# Patient Record
Sex: MALE | Race: WHITE | NOT HISPANIC OR LATINO
[De-identification: names, ages, dates, MRNs, and addresses within clinical notes are randomized per-mention and may not be internally consistent; named-entity substitution may affect disease eponyms.]

---

## 2020-07-07 ENCOUNTER — APPOINTMENT (OUTPATIENT)
Dept: UROLOGY | Facility: CLINIC | Age: 55
End: 2020-07-07
Payer: COMMERCIAL

## 2020-07-07 VITALS
SYSTOLIC BLOOD PRESSURE: 107 MMHG | HEART RATE: 73 BPM | BODY MASS INDEX: 29.23 KG/M2 | WEIGHT: 165 LBS | DIASTOLIC BLOOD PRESSURE: 68 MMHG | TEMPERATURE: 98.1 F | HEIGHT: 63 IN

## 2020-07-07 DIAGNOSIS — Z12.5 ENCOUNTER FOR SCREENING FOR MALIGNANT NEOPLASM OF PROSTATE: ICD-10-CM

## 2020-07-07 PROCEDURE — 99204 OFFICE O/P NEW MOD 45 MIN: CPT

## 2020-07-07 NOTE — PHYSICAL EXAM
[General Appearance - Well Nourished] : well nourished [General Appearance - Well Developed] : well developed [Respiration, Rhythm And Depth] : normal respiratory rhythm and effort [] : no respiratory distress [Heart Rate And Rhythm] : Heart rate and rhythm were normal [Bowel Sounds] : normal bowel sounds [Abdomen Soft] : soft [Testes Mass (___cm)] : there were no testicular masses [Testes Tenderness] : no tenderness of the testes [Penis Abnormality] : normal circumcised penis [Normal Station and Gait] : the gait and station were normal for the patient's age [Prostate Tenderness] : the prostate was not tender [Skin Turgor] : supple [No Focal Deficits] : no focal deficits [Skin Color & Pigmentation] : normal skin color and pigmentation [Not Anxious] : not anxious [Oriented To Time, Place, And Person] : oriented to person, place, and time [FreeTextEntry1] : TREVOR 40g, smooth based, no nodules

## 2020-07-07 NOTE — ASSESSMENT
[FreeTextEntry1] : 53 yo M with a PMHx of CAD s/p cardiac stent, here today to establish care\par \par - PSA ordered, TREVOR without nodules\par - Given history and symptoms, will send urine for culture today.

## 2020-07-07 NOTE — HISTORY OF PRESENT ILLNESS
[Weak Stream] : weak stream [Urinary Urgency] : urinary urgency [FreeTextEntry1] : 55 yo M with a PMHx of urinary frequency was seeing Dr. Dunham (?) who recently retired for LUTS who gave him a "medication" that resolved his symptoms. Complains of terminal dysuria occasional straining and split stream with occasional draining. No hematuria, no fevers/chills. \par \par Uroflow: \par  cc\par Qmax 8.9 cc/s\par Qaverage 4.1 cc/s\par PVR: 35 cc \par \par Anticoagulation: no aspirin or Plavix\par All: NKDA\par Social: restaurant LES, , no kids\par PMHx: CAD s/p cardiac stent last year; aortic stenosis\par FHx: father passed away from MI, familial hyperlipidemia, \par PSHx: cardiac stent placement, no family history of  malignancy\par  [Urinary Retention] : no urinary retention [Urinary Incontinence] : no urinary incontinence [Urinary Frequency] : no urinary frequency

## 2020-07-13 LAB — BACTERIA UR CULT: NORMAL

## 2020-08-03 LAB
PSA FREE FLD-MCNC: 39 %
PSA FREE SERPL-MCNC: 0.39 NG/ML
PSA SERPL-MCNC: 1 NG/ML

## 2020-08-22 ENCOUNTER — INPATIENT (INPATIENT)
Facility: HOSPITAL | Age: 55
LOS: 8 days | Discharge: HOME | DRG: 246 | End: 2020-08-31
Attending: INTERNAL MEDICINE | Admitting: INTERNAL MEDICINE
Payer: COMMERCIAL

## 2020-08-22 VITALS
RESPIRATION RATE: 20 BRPM | WEIGHT: 165.35 LBS | HEIGHT: 63 IN | OXYGEN SATURATION: 96 % | DIASTOLIC BLOOD PRESSURE: 56 MMHG | TEMPERATURE: 98 F | HEART RATE: 90 BPM | SYSTOLIC BLOOD PRESSURE: 104 MMHG

## 2020-08-22 DIAGNOSIS — Z90.49 ACQUIRED ABSENCE OF OTHER SPECIFIED PARTS OF DIGESTIVE TRACT: Chronic | ICD-10-CM

## 2020-08-22 LAB
A1C WITH ESTIMATED AVERAGE GLUCOSE RESULT: 5.4 % — SIGNIFICANT CHANGE UP (ref 4–5.6)
ALBUMIN SERPL ELPH-MCNC: 3.5 G/DL — SIGNIFICANT CHANGE UP (ref 3.3–5)
ALBUMIN SERPL ELPH-MCNC: 3.9 G/DL — SIGNIFICANT CHANGE UP (ref 3.3–5)
ALP SERPL-CCNC: 65 U/L — SIGNIFICANT CHANGE UP (ref 40–120)
ALP SERPL-CCNC: 75 U/L — SIGNIFICANT CHANGE UP (ref 40–120)
ALT FLD-CCNC: 36 U/L — SIGNIFICANT CHANGE UP (ref 10–45)
ALT FLD-CCNC: 41 U/L — SIGNIFICANT CHANGE UP (ref 10–45)
ANION GAP SERPL CALC-SCNC: 12 MMOL/L — SIGNIFICANT CHANGE UP (ref 5–17)
ANION GAP SERPL CALC-SCNC: 17 MMOL/L — SIGNIFICANT CHANGE UP (ref 5–17)
APTT BLD: 33.7 SEC — SIGNIFICANT CHANGE UP (ref 27.5–35.5)
APTT BLD: 89.6 SEC — HIGH (ref 27.5–35.5)
AST SERPL-CCNC: 38 U/L — SIGNIFICANT CHANGE UP (ref 10–40)
AST SERPL-CCNC: 99 U/L — HIGH (ref 10–40)
BASOPHILS # BLD AUTO: 0.02 K/UL — SIGNIFICANT CHANGE UP (ref 0–0.2)
BASOPHILS # BLD AUTO: 0.03 K/UL — SIGNIFICANT CHANGE UP (ref 0–0.2)
BASOPHILS # BLD AUTO: 0.06 K/UL — SIGNIFICANT CHANGE UP (ref 0–0.2)
BASOPHILS NFR BLD AUTO: 0.2 % — SIGNIFICANT CHANGE UP (ref 0–2)
BASOPHILS NFR BLD AUTO: 0.2 % — SIGNIFICANT CHANGE UP (ref 0–2)
BASOPHILS NFR BLD AUTO: 0.5 % — SIGNIFICANT CHANGE UP (ref 0–2)
BILIRUB SERPL-MCNC: 0.4 MG/DL — SIGNIFICANT CHANGE UP (ref 0.2–1.2)
BILIRUB SERPL-MCNC: 0.5 MG/DL — SIGNIFICANT CHANGE UP (ref 0.2–1.2)
BLD GP AB SCN SERPL QL: NEGATIVE — SIGNIFICANT CHANGE UP
BLD GP AB SCN SERPL QL: NEGATIVE — SIGNIFICANT CHANGE UP
BUN SERPL-MCNC: 22 MG/DL — SIGNIFICANT CHANGE UP (ref 7–23)
BUN SERPL-MCNC: 24 MG/DL — HIGH (ref 7–23)
CALCIUM SERPL-MCNC: 11.5 MG/DL — HIGH (ref 8.4–10.5)
CALCIUM SERPL-MCNC: 9.6 MG/DL — SIGNIFICANT CHANGE UP (ref 8.4–10.5)
CHLORIDE SERPL-SCNC: 102 MMOL/L — SIGNIFICANT CHANGE UP (ref 96–108)
CHLORIDE SERPL-SCNC: 98 MMOL/L — SIGNIFICANT CHANGE UP (ref 96–108)
CHOLEST SERPL-MCNC: 276 MG/DL — HIGH (ref 10–199)
CO2 SERPL-SCNC: 21 MMOL/L — LOW (ref 22–31)
CO2 SERPL-SCNC: 22 MMOL/L — SIGNIFICANT CHANGE UP (ref 22–31)
CREAT SERPL-MCNC: 0.89 MG/DL — SIGNIFICANT CHANGE UP (ref 0.5–1.3)
CREAT SERPL-MCNC: 1.19 MG/DL — SIGNIFICANT CHANGE UP (ref 0.5–1.3)
EOSINOPHIL # BLD AUTO: 0.04 K/UL — SIGNIFICANT CHANGE UP (ref 0–0.5)
EOSINOPHIL # BLD AUTO: 0.08 K/UL — SIGNIFICANT CHANGE UP (ref 0–0.5)
EOSINOPHIL # BLD AUTO: 0.14 K/UL — SIGNIFICANT CHANGE UP (ref 0–0.5)
EOSINOPHIL NFR BLD AUTO: 0.3 % — SIGNIFICANT CHANGE UP (ref 0–6)
EOSINOPHIL NFR BLD AUTO: 0.7 % — SIGNIFICANT CHANGE UP (ref 0–6)
EOSINOPHIL NFR BLD AUTO: 1.1 % — SIGNIFICANT CHANGE UP (ref 0–6)
ESTIMATED AVERAGE GLUCOSE: 108 MG/DL — SIGNIFICANT CHANGE UP (ref 68–114)
GAS PNL BLDV: SIGNIFICANT CHANGE UP
GLUCOSE SERPL-MCNC: 147 MG/DL — HIGH (ref 70–99)
GLUCOSE SERPL-MCNC: 270 MG/DL — HIGH (ref 70–99)
HCT VFR BLD CALC: 39 % — SIGNIFICANT CHANGE UP (ref 39–50)
HCT VFR BLD CALC: 43.1 % — SIGNIFICANT CHANGE UP (ref 39–50)
HCT VFR BLD CALC: 43.2 % — SIGNIFICANT CHANGE UP (ref 39–50)
HDLC SERPL-MCNC: 34 MG/DL — LOW
HGB BLD-MCNC: 13.1 G/DL — SIGNIFICANT CHANGE UP (ref 13–17)
HGB BLD-MCNC: 14.1 G/DL — SIGNIFICANT CHANGE UP (ref 13–17)
HGB BLD-MCNC: 14.2 G/DL — SIGNIFICANT CHANGE UP (ref 13–17)
IMM GRANULOCYTES NFR BLD AUTO: 0.5 % — SIGNIFICANT CHANGE UP (ref 0–1.5)
IMM GRANULOCYTES NFR BLD AUTO: 0.5 % — SIGNIFICANT CHANGE UP (ref 0–1.5)
IMM GRANULOCYTES NFR BLD AUTO: 0.9 % — SIGNIFICANT CHANGE UP (ref 0–1.5)
INR BLD: 1.24 — HIGH (ref 0.88–1.16)
LIDOCAIN IGE QN: 30 U/L — SIGNIFICANT CHANGE UP (ref 7–60)
LIPID PNL WITH DIRECT LDL SERPL: 226 MG/DL — HIGH
LYMPHOCYTES # BLD AUTO: 1.01 K/UL — SIGNIFICANT CHANGE UP (ref 1–3.3)
LYMPHOCYTES # BLD AUTO: 1.02 K/UL — SIGNIFICANT CHANGE UP (ref 1–3.3)
LYMPHOCYTES # BLD AUTO: 16.8 % — SIGNIFICANT CHANGE UP (ref 13–44)
LYMPHOCYTES # BLD AUTO: 2.13 K/UL — SIGNIFICANT CHANGE UP (ref 1–3.3)
LYMPHOCYTES # BLD AUTO: 8 % — LOW (ref 13–44)
LYMPHOCYTES # BLD AUTO: 9.3 % — LOW (ref 13–44)
MAGNESIUM SERPL-MCNC: 1.8 MG/DL — SIGNIFICANT CHANGE UP (ref 1.6–2.6)
MAGNESIUM SERPL-MCNC: 1.9 MG/DL — SIGNIFICANT CHANGE UP (ref 1.6–2.6)
MCHC RBC-ENTMCNC: 29.8 PG — SIGNIFICANT CHANGE UP (ref 27–34)
MCHC RBC-ENTMCNC: 29.9 PG — SIGNIFICANT CHANGE UP (ref 27–34)
MCHC RBC-ENTMCNC: 30.2 PG — SIGNIFICANT CHANGE UP (ref 27–34)
MCHC RBC-ENTMCNC: 32.7 GM/DL — SIGNIFICANT CHANGE UP (ref 32–36)
MCHC RBC-ENTMCNC: 32.9 GM/DL — SIGNIFICANT CHANGE UP (ref 32–36)
MCHC RBC-ENTMCNC: 33.6 GM/DL — SIGNIFICANT CHANGE UP (ref 32–36)
MCV RBC AUTO: 88.8 FL — SIGNIFICANT CHANGE UP (ref 80–100)
MCV RBC AUTO: 91.5 FL — SIGNIFICANT CHANGE UP (ref 80–100)
MCV RBC AUTO: 91.9 FL — SIGNIFICANT CHANGE UP (ref 80–100)
MONOCYTES # BLD AUTO: 0.66 K/UL — SIGNIFICANT CHANGE UP (ref 0–0.9)
MONOCYTES # BLD AUTO: 0.82 K/UL — SIGNIFICANT CHANGE UP (ref 0–0.9)
MONOCYTES # BLD AUTO: 0.86 K/UL — SIGNIFICANT CHANGE UP (ref 0–0.9)
MONOCYTES NFR BLD AUTO: 5.2 % — SIGNIFICANT CHANGE UP (ref 2–14)
MONOCYTES NFR BLD AUTO: 6.5 % — SIGNIFICANT CHANGE UP (ref 2–14)
MONOCYTES NFR BLD AUTO: 7.9 % — SIGNIFICANT CHANGE UP (ref 2–14)
NEUTROPHILS # BLD AUTO: 10.98 K/UL — HIGH (ref 1.8–7.4)
NEUTROPHILS # BLD AUTO: 8.85 K/UL — HIGH (ref 1.8–7.4)
NEUTROPHILS # BLD AUTO: 9.44 K/UL — HIGH (ref 1.8–7.4)
NEUTROPHILS NFR BLD AUTO: 74.2 % — SIGNIFICANT CHANGE UP (ref 43–77)
NEUTROPHILS NFR BLD AUTO: 81.4 % — HIGH (ref 43–77)
NEUTROPHILS NFR BLD AUTO: 85.8 % — HIGH (ref 43–77)
NRBC # BLD: 0 /100 WBCS — SIGNIFICANT CHANGE UP (ref 0–0)
NT-PROBNP SERPL-SCNC: 1125 PG/ML — HIGH (ref 0–300)
PHOSPHATE SERPL-MCNC: 3.9 MG/DL — SIGNIFICANT CHANGE UP (ref 2.5–4.5)
PLATELET # BLD AUTO: 171 K/UL — SIGNIFICANT CHANGE UP (ref 150–400)
PLATELET # BLD AUTO: 172 K/UL — SIGNIFICANT CHANGE UP (ref 150–400)
PLATELET # BLD AUTO: 214 K/UL — SIGNIFICANT CHANGE UP (ref 150–400)
POTASSIUM SERPL-MCNC: 3.7 MMOL/L — SIGNIFICANT CHANGE UP (ref 3.5–5.3)
POTASSIUM SERPL-MCNC: 4.4 MMOL/L — SIGNIFICANT CHANGE UP (ref 3.5–5.3)
POTASSIUM SERPL-SCNC: 3.7 MMOL/L — SIGNIFICANT CHANGE UP (ref 3.5–5.3)
POTASSIUM SERPL-SCNC: 4.4 MMOL/L — SIGNIFICANT CHANGE UP (ref 3.5–5.3)
PROT SERPL-MCNC: 7.2 G/DL — SIGNIFICANT CHANGE UP (ref 6–8.3)
PROT SERPL-MCNC: 7.3 G/DL — SIGNIFICANT CHANGE UP (ref 6–8.3)
PROTHROM AB SERPL-ACNC: 14.7 SEC — HIGH (ref 10.6–13.6)
RBC # BLD: 4.39 M/UL — SIGNIFICANT CHANGE UP (ref 4.2–5.8)
RBC # BLD: 4.7 M/UL — SIGNIFICANT CHANGE UP (ref 4.2–5.8)
RBC # BLD: 4.71 M/UL — SIGNIFICANT CHANGE UP (ref 4.2–5.8)
RBC # FLD: 13.1 % — SIGNIFICANT CHANGE UP (ref 10.3–14.5)
RBC # FLD: 13.2 % — SIGNIFICANT CHANGE UP (ref 10.3–14.5)
RBC # FLD: 13.2 % — SIGNIFICANT CHANGE UP (ref 10.3–14.5)
RH IG SCN BLD-IMP: NEGATIVE — SIGNIFICANT CHANGE UP
RH IG SCN BLD-IMP: NEGATIVE — SIGNIFICANT CHANGE UP
SARS-COV-2 RNA SPEC QL NAA+PROBE: SIGNIFICANT CHANGE UP
SODIUM SERPL-SCNC: 135 MMOL/L — SIGNIFICANT CHANGE UP (ref 135–145)
SODIUM SERPL-SCNC: 137 MMOL/L — SIGNIFICANT CHANGE UP (ref 135–145)
TOTAL CHOLESTEROL/HDL RATIO MEASUREMENT: 8.1 RATIO — SIGNIFICANT CHANGE UP (ref 3.4–9.6)
TRIGL SERPL-MCNC: 81 MG/DL — SIGNIFICANT CHANGE UP (ref 10–149)
TROPONIN T SERPL-MCNC: 0.88 NG/ML — CRITICAL HIGH (ref 0–0.01)
TSH SERPL-MCNC: 1.53 UIU/ML — SIGNIFICANT CHANGE UP (ref 0.35–4.94)
WBC # BLD: 10.87 K/UL — HIGH (ref 3.8–10.5)
WBC # BLD: 12.71 K/UL — HIGH (ref 3.8–10.5)
WBC # BLD: 12.8 K/UL — HIGH (ref 3.8–10.5)
WBC # FLD AUTO: 10.87 K/UL — HIGH (ref 3.8–10.5)
WBC # FLD AUTO: 12.71 K/UL — HIGH (ref 3.8–10.5)
WBC # FLD AUTO: 12.8 K/UL — HIGH (ref 3.8–10.5)

## 2020-08-22 PROCEDURE — 93010 ELECTROCARDIOGRAM REPORT: CPT

## 2020-08-22 PROCEDURE — 99291 CRITICAL CARE FIRST HOUR: CPT

## 2020-08-22 PROCEDURE — 93306 TTE W/DOPPLER COMPLETE: CPT | Mod: 26

## 2020-08-22 PROCEDURE — 71045 X-RAY EXAM CHEST 1 VIEW: CPT | Mod: 26

## 2020-08-22 PROCEDURE — 92943 PRQ TRLUML REVSC CH OCC ANT: CPT | Mod: LC

## 2020-08-22 PROCEDURE — 93454 CORONARY ARTERY ANGIO S&I: CPT | Mod: 26,59

## 2020-08-22 RX ORDER — NICOTINE POLACRILEX 2 MG
2 GUM BUCCAL ONCE
Refills: 0 | Status: COMPLETED | OUTPATIENT
Start: 2020-08-22 | End: 2020-08-22

## 2020-08-22 RX ORDER — LANOLIN ALCOHOL/MO/W.PET/CERES
5 CREAM (GRAM) TOPICAL AT BEDTIME
Refills: 0 | Status: DISCONTINUED | OUTPATIENT
Start: 2020-08-22 | End: 2020-08-31

## 2020-08-22 RX ORDER — ATORVASTATIN CALCIUM 80 MG/1
80 TABLET, FILM COATED ORAL AT BEDTIME
Refills: 0 | Status: DISCONTINUED | OUTPATIENT
Start: 2020-08-22 | End: 2020-08-31

## 2020-08-22 RX ORDER — TICAGRELOR 90 MG/1
180 TABLET ORAL ONCE
Refills: 0 | Status: COMPLETED | OUTPATIENT
Start: 2020-08-22 | End: 2020-08-22

## 2020-08-22 RX ORDER — HEPARIN SODIUM 5000 [USP'U]/ML
5000 INJECTION INTRAVENOUS; SUBCUTANEOUS ONCE
Refills: 0 | Status: COMPLETED | OUTPATIENT
Start: 2020-08-22 | End: 2020-08-22

## 2020-08-22 RX ORDER — HYDROXYZINE HCL 10 MG
25 TABLET ORAL ONCE
Refills: 0 | Status: COMPLETED | OUTPATIENT
Start: 2020-08-22 | End: 2020-08-22

## 2020-08-22 RX ORDER — FUROSEMIDE 40 MG
20 TABLET ORAL ONCE
Refills: 0 | Status: COMPLETED | OUTPATIENT
Start: 2020-08-22 | End: 2020-08-22

## 2020-08-22 RX ORDER — CHLORHEXIDINE GLUCONATE 213 G/1000ML
1 SOLUTION TOPICAL
Refills: 0 | Status: DISCONTINUED | OUTPATIENT
Start: 2020-08-22 | End: 2020-08-31

## 2020-08-22 RX ORDER — ASPIRIN/CALCIUM CARB/MAGNESIUM 324 MG
81 TABLET ORAL DAILY
Refills: 0 | Status: DISCONTINUED | OUTPATIENT
Start: 2020-08-23 | End: 2020-08-31

## 2020-08-22 RX ORDER — ACETAMINOPHEN 500 MG
650 TABLET ORAL ONCE
Refills: 0 | Status: COMPLETED | OUTPATIENT
Start: 2020-08-22 | End: 2020-08-22

## 2020-08-22 RX ORDER — IPRATROPIUM/ALBUTEROL SULFATE 18-103MCG
3 AEROSOL WITH ADAPTER (GRAM) INHALATION EVERY 6 HOURS
Refills: 0 | Status: DISCONTINUED | OUTPATIENT
Start: 2020-08-22 | End: 2020-08-29

## 2020-08-22 RX ORDER — ASPIRIN/CALCIUM CARB/MAGNESIUM 324 MG
162 TABLET ORAL ONCE
Refills: 0 | Status: DISCONTINUED | OUTPATIENT
Start: 2020-08-22 | End: 2020-08-22

## 2020-08-22 RX ORDER — NICOTINE POLACRILEX 2 MG
1 GUM BUCCAL DAILY
Refills: 0 | Status: DISCONTINUED | OUTPATIENT
Start: 2020-08-22 | End: 2020-08-31

## 2020-08-22 RX ORDER — ENOXAPARIN SODIUM 100 MG/ML
40 INJECTION SUBCUTANEOUS EVERY 24 HOURS
Refills: 0 | Status: DISCONTINUED | OUTPATIENT
Start: 2020-08-22 | End: 2020-08-31

## 2020-08-22 RX ORDER — TICAGRELOR 90 MG/1
90 TABLET ORAL EVERY 12 HOURS
Refills: 0 | Status: DISCONTINUED | OUTPATIENT
Start: 2020-08-22 | End: 2020-08-27

## 2020-08-22 RX ADMIN — Medication 5 MILLIGRAM(S): at 21:41

## 2020-08-22 RX ADMIN — Medication 3 MILLILITER(S): at 12:35

## 2020-08-22 RX ADMIN — Medication 3 MILLILITER(S): at 21:40

## 2020-08-22 RX ADMIN — ATORVASTATIN CALCIUM 80 MILLIGRAM(S): 80 TABLET, FILM COATED ORAL at 21:41

## 2020-08-22 RX ADMIN — HEPARIN SODIUM 5000 UNIT(S): 5000 INJECTION INTRAVENOUS; SUBCUTANEOUS at 05:45

## 2020-08-22 RX ADMIN — ENOXAPARIN SODIUM 40 MILLIGRAM(S): 100 INJECTION SUBCUTANEOUS at 21:41

## 2020-08-22 RX ADMIN — Medication 25 MILLIGRAM(S): at 23:09

## 2020-08-22 RX ADMIN — Medication 650 MILLIGRAM(S): at 14:01

## 2020-08-22 RX ADMIN — TICAGRELOR 180 MILLIGRAM(S): 90 TABLET ORAL at 05:48

## 2020-08-22 RX ADMIN — Medication 650 MILLIGRAM(S): at 21:04

## 2020-08-22 RX ADMIN — Medication 650 MILLIGRAM(S): at 22:04

## 2020-08-22 RX ADMIN — Medication 100 MILLIGRAM(S): at 09:22

## 2020-08-22 RX ADMIN — Medication 650 MILLIGRAM(S): at 15:00

## 2020-08-22 RX ADMIN — Medication 20 MILLIGRAM(S): at 10:35

## 2020-08-22 RX ADMIN — Medication 1 PATCH: at 22:31

## 2020-08-22 RX ADMIN — Medication 3 MILLILITER(S): at 16:36

## 2020-08-22 RX ADMIN — TICAGRELOR 90 MILLIGRAM(S): 90 TABLET ORAL at 17:18

## 2020-08-22 NOTE — H&P ADULT - NSHPPHYSICALEXAM_GEN_ALL_CORE
.  VITAL SIGNS:  T(C): 36.8 (08-22-20 @ 09:00), Max: 36.8 (08-22-20 @ 09:00)  T(F): 98.3 (08-22-20 @ 09:00), Max: 98.3 (08-22-20 @ 09:00)  HR: 91 (08-22-20 @ 08:00) (82 - 93)  BP: 97/62 (08-22-20 @ 08:00) (95/56 - 109/62)  BP(mean): 74 (08-22-20 @ 08:00) (74 - 80)  RR: 34 (08-22-20 @ 08:00) (20 - 34)  SpO2: 93% (08-22-20 @ 08:00) (90% - 100%)  Wt(kg): --    PHYSICAL EXAM:  Constitutional: WDWN resting comfortably in bed; NAD  Head: NC/AT  ENT: MMM  Neck: supple; no JVD or thyromegaly  Respiratory: Significant wheezing appreciated bilaterally at both lung bases. No crackles.  Cardiac: +S1/S2; RRR; no M/R/G  Gastrointestinal: soft, NT/ND; no rebound or guarding; +BSx4  Extremities: WWP, no clubbing or cyanosis; no peripheral edema  Vascular: 2+ radial, DP/PT pulses B/L  Dermatologic: Right radial band in place with slight. No evidence of hematoma formation.   Neurologic: AAOx3; CNII-XII grossly intact; no focal deficits

## 2020-08-22 NOTE — ED ADULT NURSE NOTE - OBJECTIVE STATEMENT
Received a 55 year male with a chief complaint of chest pain onset at 0300 this AM while arriving at his home residence and ascending three flights of stairs. Patient reports concurrent shortness of breath, diaphoresis, and dyspnea with rest. Patient reports that he had intended to present to the ED when his symptoms have not improved. Patient describes the sensation as "chest discomfort" to the left side of his chest, non-radiating. Received a 55 year male with a chief complaint of chest pain onset at 0300 this AM while arriving at his home residence and ascending three flights of stairs. Patient reports concurrent shortness of breath, diaphoresis, and dyspnea with rest. Patient reports that he had intended to present to the ED when his symptoms have not improved. Patient describes the sensation as "chest discomfort" to the left side of his chest, non-radiating.    Additional assessment deferred for STEMI R/O notification and intervention.

## 2020-08-22 NOTE — H&P ADULT - NSHPLABSRESULTS_GEN_ALL_CORE
.  LABS:                         14.2   12.71 )-----------( 214      ( 22 Aug 2020 05:56 )             43.2     08-22    137  |  98  |  24<H>  ----------------------------<  270<H>  3.7   |  22  |  1.19    Ca    11.5<H>      22 Aug 2020 05:56  Mg     1.9     08-22    TPro  7.3  /  Alb  3.9  /  TBili  0.4  /  DBili  x   /  AST  38  /  ALT  36  /  AlkPhos  75  08-22        CARDIAC MARKERS ( 22 Aug 2020 05:56 )  x     / 0.88 ng/mL / x     / x     / x          Serum Pro-Brain Natriuretic Peptide: 1125 pg/mL (08-22 @ 05:56)        RADIOLOGY, EKG & ADDITIONAL TESTS: Reviewed.

## 2020-08-22 NOTE — ED PROVIDER NOTE - CLINICAL SUMMARY MEDICAL DECISION MAKING FREE TEXT BOX
chest tightness, diaphoretic, STEMI on EMS ekg. given aspirin and ivf  -check labs, ekg  -cxr  -continue IVF  -cath notification called - spoke with Dr. Hutson - recommend loading with brilinta and heparin

## 2020-08-22 NOTE — H&P ADULT - HISTORY OF PRESENT ILLNESS
Mr. Alexandrea Winter is a 55 year old male with past medical history significant for CAD (s/p proximal LAD stent in 2018), HLD, 40 pack year smoking history, history of aortic valve stenosis pending aortic valve replacement, presented this morning from his home with left sided chest pain, tightness, diaphoresis, and shortness of breath. He was found to have ST elevations in inferior leads and an elevated trop of 0.88, and was admitted ultimately for STEMI and sent emergently to cath lab. In the cath lab, he was found to have 85% occlusion to his proximal RCA, 75% occlusion to his mid RCA, 50% occlusion in the left main artery, and 75% occlusion to LAD proximal. His OM1 was found to be 100% thrombosed, was determined to be the culprit lesion, and STENT was placed. On morning of presentation around 2:45 AM, he went outside for a smoke, and when he was walking up the three flights of stairs to his apartment he experienced significant shortness of breath and chest pain, which he says he has experienced before; however, this time it lasted more than an hour. He tried to fall asleep, but was unable to because of the pain, and then called the ambulance. When he went down the three flights of stairs he fell to his knees in his apartment lobby because of the pain and shortness of breath. He did not hit his head. He was admitted after the procedure to the CCU for further monitoring.     This morning at bedside after his procedure, he felt significant better, without chest pain or shortness of breath.

## 2020-08-22 NOTE — H&P ADULT - ASSESSMENT
Mr. Alexandrea Winter is a 55 year old male with past medical history significant for CAD (s/p proximal LAD stent in 2018), HLD, 40 pack year smoking history, history of aortic valve stenosis pending aortic valve replacement, presented this morning with ST elevations in inferior leads and an elevated trop of 0.88, now s/p cath and 1 STEPHENIE placed in OM, admitted to CCU for further monitoring and management. Mr. Alexandrea Winter is a 55 year old male with past medical history significant for CAD (s/p proximal LAD stent in 2018), HLD, 40 pack year smoking history, history of aortic valve stenosis pending aortic valve replacement, presented this morning with ST elevations in inferior leads and an elevated trop of 0.88, now s/p cath and 1 STEPHENIE placed in OM, admitted to CCU for further monitoring and management.     Cardio  #STEMI: He was found to have ST elevations in inferior leads and an elevated trop of 0.88. In the cath lab, he was found to have 85% occlusion to his proximal RCA, 75% occlusion to his mid RCA, 50% occlusion in the left main artery, and 75% occlusion to LAD proximal. His OM1 was found to be 100% thrombosed, was determined to be the culprit lesion, and STENT was placed.   -Loaded with aspirin and brillanta this morning  -C/w brillanta 90 mg BID starting today 5 P, cw lipitor 80  -C/w aspirin 81 tomorrow  -F/u EKG, echo    #HLD: Lipid panel showed cholesterol 276, Direct , HDL: 34  -Continue with lipitor 80 mg daily    #CHF: CXR showed fluid congestion  -Questionable history of CHF-echo showing EF 35%, follow up official read  -Lasix 20mg IV  -Moniter I&O    #Aortic Stenosis: History of Aortic Stenosis   -F/u echocardiogram   -Obtain collaterol from cardiologist on Monday     Pulm  # Pulmonary Edema  -Requiring 4L O2, saturating at 98%, will wean   -Lasix 20 mg IV    #COPD-40 pack year smoking history with chronic cough  -On Duonebs Q 6 and Benzonotate   -f/u CXR     Endocrine  #Diabetes: Unsure of diabetic history  -A1C 5.4  -Continue to monitor     Renal  -No active issues    GI  -No active issues    Preventative Measures  E: replete as needed  N: DASH diet  DVT: SCD   GI PPX: none

## 2020-08-22 NOTE — H&P ADULT - NSHPREVIEWOFSYSTEMS_GEN_ALL_CORE
REVIEW OF SYSTEMS:    CONSTITUTIONAL: No weakness, fevers or chills  EYES/ENT: No visual changes;  No vertigo or throat pain   NECK: No pain or stiffness  RESPIRATORY: Significant for cough.   CARDIOVASCULAR: No chest pain or palpitations  GASTROINTESTINAL: No abdominal or epigastric pain. No nausea, vomiting, or hematemesis; No diarrhea or constipation. No melena or hematochezia.  GENITOURINARY: No dysuria, frequency or hematuria  NEUROLOGICAL: No numbness or weakness  SKIN: No itching, burning, rashes, or lesions   All other review of systems is negative unless indicated above.

## 2020-08-22 NOTE — ED ADULT TRIAGE NOTE - AS TEMP SITE
oral
c shaped area of erythema r thigh 7cm x 1.5cm with warmth no fluctuance or induration/BLANCHING

## 2020-08-22 NOTE — H&P ADULT - NSHPSOCIALHISTORY_GEN_ALL_CORE
40 Pack year tobacco history. Socially drinks. No illicit drug use. Lives alone in apartment. Works as manager of fast food restaurant.

## 2020-08-22 NOTE — ED PROVIDER NOTE - OBJECTIVE STATEMENT
55M hx high chol, stents, c/o chest tightness. pt states tightness started tonight.  felt very sweaty with tightness.  upon EMS arrival pt diaphoretic, hypotense.  noted to have ST elevations inferiorly, given aspirin, ivf started.  pt also given calcium per EMS tele recommendation.  no fevers. no LE swelling. pt states only takes medication for his high chol.  states stent was placed 2 years ago, however not on aspirin or plavix.  pt states he is supposed to be scheduled for an aortic valve replacement.

## 2020-08-23 LAB
ALBUMIN SERPL ELPH-MCNC: 3.6 G/DL — SIGNIFICANT CHANGE UP (ref 3.3–5)
ALP SERPL-CCNC: 62 U/L — SIGNIFICANT CHANGE UP (ref 40–120)
ALT FLD-CCNC: 51 U/L — HIGH (ref 10–45)
ANION GAP SERPL CALC-SCNC: 11 MMOL/L — SIGNIFICANT CHANGE UP (ref 5–17)
APPEARANCE UR: CLEAR — SIGNIFICANT CHANGE UP
AST SERPL-CCNC: 146 U/L — HIGH (ref 10–40)
BASE EXCESS BLDA CALC-SCNC: 5.1 MMOL/L — HIGH (ref -2–3)
BASOPHILS # BLD AUTO: 0.03 K/UL — SIGNIFICANT CHANGE UP (ref 0–0.2)
BASOPHILS NFR BLD AUTO: 0.3 % — SIGNIFICANT CHANGE UP (ref 0–2)
BILIRUB SERPL-MCNC: 0.8 MG/DL — SIGNIFICANT CHANGE UP (ref 0.2–1.2)
BILIRUB UR-MCNC: NEGATIVE — SIGNIFICANT CHANGE UP
BUN SERPL-MCNC: 17 MG/DL — SIGNIFICANT CHANGE UP (ref 7–23)
CALCIUM SERPL-MCNC: 9 MG/DL — SIGNIFICANT CHANGE UP (ref 8.4–10.5)
CHLORIDE SERPL-SCNC: 101 MMOL/L — SIGNIFICANT CHANGE UP (ref 96–108)
CO2 SERPL-SCNC: 25 MMOL/L — SIGNIFICANT CHANGE UP (ref 22–31)
COLOR SPEC: YELLOW — SIGNIFICANT CHANGE UP
CREAT SERPL-MCNC: 0.83 MG/DL — SIGNIFICANT CHANGE UP (ref 0.5–1.3)
DIFF PNL FLD: ABNORMAL
EOSINOPHIL # BLD AUTO: 0.14 K/UL — SIGNIFICANT CHANGE UP (ref 0–0.5)
EOSINOPHIL NFR BLD AUTO: 1.2 % — SIGNIFICANT CHANGE UP (ref 0–6)
GLUCOSE SERPL-MCNC: 134 MG/DL — HIGH (ref 70–99)
GLUCOSE UR QL: NEGATIVE — SIGNIFICANT CHANGE UP
HCO3 BLDA-SCNC: 28 MMOL/L — SIGNIFICANT CHANGE UP (ref 21–28)
HCT VFR BLD CALC: 39.5 % — SIGNIFICANT CHANGE UP (ref 39–50)
HCV AB S/CO SERPL IA: 0.09 S/CO — SIGNIFICANT CHANGE UP
HCV AB SERPL-IMP: SIGNIFICANT CHANGE UP
HGB BLD-MCNC: 13.1 G/DL — SIGNIFICANT CHANGE UP (ref 13–17)
IMM GRANULOCYTES NFR BLD AUTO: 0.7 % — SIGNIFICANT CHANGE UP (ref 0–1.5)
KETONES UR-MCNC: NEGATIVE — SIGNIFICANT CHANGE UP
LEUKOCYTE ESTERASE UR-ACNC: NEGATIVE — SIGNIFICANT CHANGE UP
LYMPHOCYTES # BLD AUTO: 1.35 K/UL — SIGNIFICANT CHANGE UP (ref 1–3.3)
LYMPHOCYTES # BLD AUTO: 12 % — LOW (ref 13–44)
MAGNESIUM SERPL-MCNC: 1.8 MG/DL — SIGNIFICANT CHANGE UP (ref 1.6–2.6)
MCHC RBC-ENTMCNC: 29.7 PG — SIGNIFICANT CHANGE UP (ref 27–34)
MCHC RBC-ENTMCNC: 33.2 GM/DL — SIGNIFICANT CHANGE UP (ref 32–36)
MCV RBC AUTO: 89.6 FL — SIGNIFICANT CHANGE UP (ref 80–100)
MONOCYTES # BLD AUTO: 0.92 K/UL — HIGH (ref 0–0.9)
MONOCYTES NFR BLD AUTO: 8.1 % — SIGNIFICANT CHANGE UP (ref 2–14)
MRSA PCR RESULT.: NEGATIVE — SIGNIFICANT CHANGE UP
NEUTROPHILS # BLD AUTO: 8.77 K/UL — HIGH (ref 1.8–7.4)
NEUTROPHILS NFR BLD AUTO: 77.7 % — HIGH (ref 43–77)
NITRITE UR-MCNC: NEGATIVE — SIGNIFICANT CHANGE UP
NRBC # BLD: 0 /100 WBCS — SIGNIFICANT CHANGE UP (ref 0–0)
PCO2 BLDA: 34 MMHG — LOW (ref 35–48)
PH BLDA: 7.53 — HIGH (ref 7.35–7.45)
PH UR: 6.5 — SIGNIFICANT CHANGE UP (ref 5–8)
PHOSPHATE SERPL-MCNC: 3.9 MG/DL — SIGNIFICANT CHANGE UP (ref 2.5–4.5)
PLATELET # BLD AUTO: 160 K/UL — SIGNIFICANT CHANGE UP (ref 150–400)
PO2 BLDA: 67 MMHG — LOW (ref 83–108)
POTASSIUM SERPL-MCNC: 4 MMOL/L — SIGNIFICANT CHANGE UP (ref 3.5–5.3)
POTASSIUM SERPL-SCNC: 4 MMOL/L — SIGNIFICANT CHANGE UP (ref 3.5–5.3)
PROCALCITONIN SERPL-MCNC: 0.15 NG/ML — HIGH (ref 0.02–0.1)
PROT SERPL-MCNC: 7 G/DL — SIGNIFICANT CHANGE UP (ref 6–8.3)
PROT UR-MCNC: NEGATIVE MG/DL — SIGNIFICANT CHANGE UP
RBC # BLD: 4.41 M/UL — SIGNIFICANT CHANGE UP (ref 4.2–5.8)
RBC # FLD: 13.2 % — SIGNIFICANT CHANGE UP (ref 10.3–14.5)
S AUREUS DNA NOSE QL NAA+PROBE: NEGATIVE — SIGNIFICANT CHANGE UP
SAO2 % BLDA: 95 % — SIGNIFICANT CHANGE UP (ref 95–100)
SARS-COV-2 IGG SERPL QL IA: NEGATIVE — SIGNIFICANT CHANGE UP
SARS-COV-2 IGM SERPL IA-ACNC: 0.09 INDEX — SIGNIFICANT CHANGE UP
SODIUM SERPL-SCNC: 137 MMOL/L — SIGNIFICANT CHANGE UP (ref 135–145)
SP GR SPEC: 1.01 — SIGNIFICANT CHANGE UP (ref 1–1.03)
UROBILINOGEN FLD QL: 0.2 E.U./DL — SIGNIFICANT CHANGE UP
WBC # BLD: 11.29 K/UL — HIGH (ref 3.8–10.5)
WBC # FLD AUTO: 11.29 K/UL — HIGH (ref 3.8–10.5)

## 2020-08-23 PROCEDURE — 93880 EXTRACRANIAL BILAT STUDY: CPT | Mod: 26

## 2020-08-23 PROCEDURE — 99291 CRITICAL CARE FIRST HOUR: CPT

## 2020-08-23 PROCEDURE — 99222 1ST HOSP IP/OBS MODERATE 55: CPT

## 2020-08-23 PROCEDURE — 71045 X-RAY EXAM CHEST 1 VIEW: CPT | Mod: 26

## 2020-08-23 RX ORDER — ACETAMINOPHEN 500 MG
650 TABLET ORAL EVERY 6 HOURS
Refills: 0 | Status: DISCONTINUED | OUTPATIENT
Start: 2020-08-23 | End: 2020-08-31

## 2020-08-23 RX ORDER — MAGNESIUM SULFATE 500 MG/ML
2 VIAL (ML) INJECTION ONCE
Refills: 0 | Status: COMPLETED | OUTPATIENT
Start: 2020-08-23 | End: 2020-08-23

## 2020-08-23 RX ORDER — MAGNESIUM SULFATE 500 MG/ML
2 VIAL (ML) INJECTION ONCE
Refills: 0 | Status: DISCONTINUED | OUTPATIENT
Start: 2020-08-23 | End: 2020-08-23

## 2020-08-23 RX ORDER — CEFTRIAXONE 500 MG/1
1000 INJECTION, POWDER, FOR SOLUTION INTRAMUSCULAR; INTRAVENOUS EVERY 24 HOURS
Refills: 0 | Status: DISCONTINUED | OUTPATIENT
Start: 2020-08-23 | End: 2020-08-27

## 2020-08-23 RX ORDER — AZITHROMYCIN 500 MG/1
500 TABLET, FILM COATED ORAL EVERY 24 HOURS
Refills: 0 | Status: COMPLETED | OUTPATIENT
Start: 2020-08-23 | End: 2020-08-25

## 2020-08-23 RX ORDER — FUROSEMIDE 40 MG
20 TABLET ORAL ONCE
Refills: 0 | Status: COMPLETED | OUTPATIENT
Start: 2020-08-23 | End: 2020-08-23

## 2020-08-23 RX ADMIN — CHLORHEXIDINE GLUCONATE 1 APPLICATION(S): 213 SOLUTION TOPICAL at 05:58

## 2020-08-23 RX ADMIN — Medication 50 GRAM(S): at 06:15

## 2020-08-23 RX ADMIN — Medication 5 MILLIGRAM(S): at 22:27

## 2020-08-23 RX ADMIN — Medication 650 MILLIGRAM(S): at 05:40

## 2020-08-23 RX ADMIN — ATORVASTATIN CALCIUM 80 MILLIGRAM(S): 80 TABLET, FILM COATED ORAL at 22:27

## 2020-08-23 RX ADMIN — ENOXAPARIN SODIUM 40 MILLIGRAM(S): 100 INJECTION SUBCUTANEOUS at 22:27

## 2020-08-23 RX ADMIN — Medication 1 PATCH: at 12:34

## 2020-08-23 RX ADMIN — Medication 3 MILLILITER(S): at 05:43

## 2020-08-23 RX ADMIN — Medication 650 MILLIGRAM(S): at 12:46

## 2020-08-23 RX ADMIN — Medication 650 MILLIGRAM(S): at 17:56

## 2020-08-23 RX ADMIN — Medication 1 PATCH: at 22:00

## 2020-08-23 RX ADMIN — AZITHROMYCIN 255 MILLIGRAM(S): 500 TABLET, FILM COATED ORAL at 10:01

## 2020-08-23 RX ADMIN — Medication 650 MILLIGRAM(S): at 04:40

## 2020-08-23 RX ADMIN — Medication 650 MILLIGRAM(S): at 11:33

## 2020-08-23 RX ADMIN — Medication 20 MILLIGRAM(S): at 09:34

## 2020-08-23 RX ADMIN — Medication 1 PATCH: at 06:08

## 2020-08-23 RX ADMIN — TICAGRELOR 90 MILLIGRAM(S): 90 TABLET ORAL at 17:56

## 2020-08-23 RX ADMIN — CEFTRIAXONE 100 MILLIGRAM(S): 500 INJECTION, POWDER, FOR SOLUTION INTRAMUSCULAR; INTRAVENOUS at 09:34

## 2020-08-23 RX ADMIN — Medication 3 MILLILITER(S): at 22:41

## 2020-08-23 RX ADMIN — TICAGRELOR 90 MILLIGRAM(S): 90 TABLET ORAL at 06:00

## 2020-08-23 RX ADMIN — Medication 3 MILLILITER(S): at 15:57

## 2020-08-23 RX ADMIN — Medication 81 MILLIGRAM(S): at 12:33

## 2020-08-23 RX ADMIN — Medication 3 MILLILITER(S): at 09:14

## 2020-08-23 NOTE — PROGRESS NOTE ADULT - ASSESSMENT
Mr. Alexandrea Winter is a 55 year old male with past medical history significant for CAD (s/p proximal LAD stent in 2018), HLD, 40 pack year smoking history, history of aortic valve stenosis pending aortic valve replacement, presented this morning with ST elevations in inferior leads and an elevated trop of 0.88, now s/p cath and 1 STEPHENIE placed in OM, admitted to CCU for further monitoring and management.     Cardio  #STEMI: He was found to have ST elevations in inferior leads and an elevated trop of 0.88. In the cath lab, he was found to have 85% occlusion to his proximal RCA, 75% occlusion to his mid RCA, 50% occlusion in the left main artery, and 75% occlusion to LAD proximal. His OM1 was found to be 100% thrombosed, was determined to be the culprit lesion, and STENT was placed.   -Loaded with aspirin and brillanta this morning  -C/w brillanta 90 mg BID starting today 5 P, cw lipitor 80  -C/w aspirin 81 tomorrow  -F/u EKG, echo    #HLD: Lipid panel showed cholesterol 276, Direct , HDL: 34  -Continue with lipitor 80 mg daily    #CHF: CXR showed fluid congestion  -Questionable history of CHF-echo showing EF 35%, follow up official read  -Lasix 20mg IV  -Moniter I&O    #Aortic Stenosis: History of Aortic Stenosis   -F/u echocardiogram   -Obtain collaterol from cardiologist on Monday     Pulm  # Pulmonary Edema  -Requiring 4L O2, saturating at 98%, will wean   -Lasix 20 mg IV    #COPD-40 pack year smoking history with chronic cough  -On Duonebs Q 6 and Benzonotate   -f/u CXR     Endocrine  #Diabetes: Unsure of diabetic history  -A1C 5.4  -Continue to monitor     Renal  -No active issues    GI  -No active issues    Preventative Measures  E: replete as needed  N: DASH diet  DVT: SCD   GI PPX: none Mr. Alexandrea Winter is a 55 year old male with past medical history significant for CAD (s/p proximal LAD stent in 2018), HLD, 40 pack year smoking history, history of aortic valve stenosis pending aortic valve replacement, presented this morning with ST elevations in inferior leads and an elevated trop of 0.88, now s/p cath and 1 STEPHENIE placed in OM, admitted to CCU for further monitoring and management.     Cardio  #STEMI: He was found to have ST elevations in inferior leads and an elevated trop of 0.88. In the cath lab, he was found to have 85% occlusion to his proximal RCA, 75% occlusion to his mid RCA, 50% occlusion in the left main artery, and 75% occlusion to LAD proximal. His OM1 was found to be 100% thrombosed, was determined to be the culprit lesion, and STENT was placed.   -C/w brillanta 90 mg BID  -C/w aspirin 81 mg daily  -c/w lipitor 80 mg at bedtime  -Echo 8/22- severe aortic stenosis. The peak transvalvular velocity is 3.52 m/s, the mean transvalvular gradient is 35 mmHg, and the LVOT/AV velocity ratio is 0.19. The peak transaortic gradient is 51 mmHg. The aortic valve area (estimated via the continuity method) is 0.6 cm². There is no evidence of aortic regurgitation. Mild mitral regurgitation.The right ventricle is normal in size. Right ventricular systolic function is normal.There is mild concentric left ventricular hypertrophy. Left ventricular systolic function is xtkhab-th-ceshbuduzb reduced with a calculated ejection fraction of 40-45% with regional wall motion abnormalities. There is severe hypokinesis of mid inferolateral, mid anterolateral and basal inferior wall. No pericardial effusion.  -daily EKGs    #Severe aortic stenosis- pending aortic valve replacement   -CT surgery/interventional team consulted 8/23 for severe aortic stenosis in setting of recent STEMI  -obtain room air ABG  -Obtain collateral from cardiologist on Monday     #HLD  -Lipid panel showed cholesterol 276, Direct , HDL: 34  -Continue with lipitor 80 mg daily    #CHF:   -Left ventricular systolic function is ailfxs-qy-cazxxxdhno reduced with a calculated ejection fraction of 40-45% with regional wall motion abnormalities.   CXRs showing pulmonary edema, fluid congestion  -Lasix 20mg IV given 8/23  Monitor I&O    Pulm  # Pulmonary Edema  -Requiring 4L O2, saturating at 98%, will wean   -Lasix 20 mg IV    #COPD-40 pack year smoking history with chronic cough  -On Duonebs Q6h and benzonatate 100 mg PO TID PRN for coughing  -f/u daily CXR  -nicotine patch transdermal daily     Endocrine  #Diabetes: Unsure of diabetic history  -A1C 5.4  -Continue to monitor     Renal  -No active issues    GI  -No active issues    ID  #Community acquired pneumonia - CXR 8/23 showing some consolidation in L middle lobe  -ceftriaxone 1 g q24h x 5 days  -azithromycin 500 mg q24h x 3 days  -procal 0.15  -f/u 8/23 UA    Preventative Measures  E: replete as needed  N: DASH/TLC diet   DVT: SCDs, lovenox 40 mg subQ q24h  GI PPX: none Mr. Alexandrea Winter is a 55 year old male with past medical history significant for CAD (s/p proximal LAD stent in 2018), HLD, 40 pack year smoking history, history of aortic valve stenosis pending aortic valve replacement, presented this morning with ST elevations in inferior leads and an elevated trop of 0.88, now s/p cath and 1 STEPHENIE placed in OM, admitted to CCU for further monitoring and management.     Cardio  #STEMI: He was found to have ST elevations in inferior leads and an elevated trop of 0.88. In the cath lab, he was found to have 85% occlusion to his proximal RCA, 75% occlusion to his mid RCA, 50% occlusion in the left main artery, and 75% occlusion to LAD proximal. His OM1 was found to be 100% thrombosed, was determined to be the culprit lesion, and STENT was placed.   -C/w brillanta 90 mg BID  -C/w aspirin 81 mg daily  -c/w lipitor 80 mg at bedtime  -Echo 8/22- severe aortic stenosis. The peak transvalvular velocity is 3.52 m/s, the mean transvalvular gradient is 35 mmHg, and the LVOT/AV velocity ratio is 0.19. The peak transaortic gradient is 51 mmHg. The aortic valve area (estimated via the continuity method) is 0.6 cm². There is no evidence of aortic regurgitation. Mild mitral regurgitation.The right ventricle is normal in size. Right ventricular systolic function is normal.There is mild concentric left ventricular hypertrophy. Left ventricular systolic function is kdyrtk-px-lkbpkxyhvd reduced with a calculated ejection fraction of 40-45% with regional wall motion abnormalities. There is severe hypokinesis of mid inferolateral, mid anterolateral and basal inferior wall. No pericardial effusion.  -daily EKGs    #Severe aortic stenosis- pending aortic valve replacement   -CT surgery/interventional team consulted 8/23 for severe aortic stenosis in setting of recent STEMI  -obtain room air ABG  -Obtain collateral from cardiologist on Monday     #HLD  -Lipid panel showed cholesterol 276, Direct , HDL: 34  -Continue with lipitor 80 mg daily    #CHF:   -Left ventricular systolic function is jyupak-bk-ddqhmkvyyo reduced with a calculated ejection fraction of 40-45% with regional wall motion abnormalities.   CXRs showing pulmonary edema, fluid congestion  -Lasix 20mg IV given 8/23  Monitor I&O    Pulm  #Pulmonary Edema  -Lasix 20 mg IV given 8/23    #COPD-40 pack year smoking history with chronic cough  -On Duonebs Q6h and benzonatate 100 mg PO TID PRN for coughing  -f/u daily CXR  -nicotine patch transdermal daily     Endocrine  #Diabetes: Unsure of diabetic history  -A1C 5.4  -Continue to monitor     Renal  -No active issues    GI  -No active issues    ID  #Community acquired pneumonia - CXR 8/23 showing some consolidation in L middle lobe  -ceftriaxone 1 g q24h x 5 days  -azithromycin 500 mg q24h x 3 days  -procal 0.15  -f/u 8/23 UA    Preventative Measures  E: replete as needed  N: DASH/TLC diet   DVT: SCDs, lovenox 40 mg subQ q24h  GI PPX: none Mr. Alexandrea Winter is a 55 year old male with past medical history significant for CAD (s/p proximal LAD stent in 2018), HLD, 40 pack year smoking history, history of aortic valve stenosis pending aortic valve replacement, presented this morning with ST elevations in inferior leads and an elevated trop of 0.88, now s/p cath and 1 STEPHENIE placed in OM, admitted to CCU for further monitoring and management.     Cardio  #STEMI: He was found to have ST elevations in inferior leads and an elevated trop of 0.88. In the cath lab, he was found to have 85% occlusion to his proximal RCA, 75% occlusion to his mid RCA, 50% occlusion in the left main artery, and 75% occlusion to LAD proximal. His OM1 was found to be 100% thrombosed, was determined to be the culprit lesion, and STENT was placed.   -C/w brillanta 90 mg BID  -C/w aspirin 81 mg daily  -c/w lipitor 80 mg at bedtime  -Echo 8/22- severe aortic stenosis. The peak transvalvular velocity is 3.52 m/s, the mean transvalvular gradient is 35 mmHg, and the LVOT/AV velocity ratio is 0.19. The peak transaortic gradient is 51 mmHg. The aortic valve area (estimated via the continuity method) is 0.6 cm². There is no evidence of aortic regurgitation. Mild mitral regurgitation.The right ventricle is normal in size. Right ventricular systolic function is normal.There is mild concentric left ventricular hypertrophy. Left ventricular systolic function is tzaqpk-vk-trmjokbldj reduced with a calculated ejection fraction of 40-45% with regional wall motion abnormalities. There is severe hypokinesis of mid inferolateral, mid anterolateral and basal inferior wall. No pericardial effusion.  -daily EKGs    #Severe aortic stenosis- pending aortic valve replacement   -CT surgery/interventional/structural team consulted 8/23 for severe aortic stenosis in setting of recent STEMI  -obtain room air ABG  -Obtain collateral from cardiologist on Monday     #HLD  -Lipid panel showed cholesterol 276, Direct , HDL: 34  -Continue with lipitor 80 mg daily    #HFrEF:   -Left ventricular systolic function is yxrfns-fa-szocasxngu reduced with a calculated ejection fraction of 40-45% with regional wall motion abnormalities.   CXRs showing pulmonary edema, fluid congestion  -Lasix 20mg IV given 8/23  -Monitor I&O    Pulm  #Pulmonary Edema  -Lasix 20 mg IV given 8/23    #COPD-40 pack year smoking history with chronic cough  -On Duonebs Q6h and benzonatate 100 mg PO TID PRN for coughing  -f/u daily CXR  -nicotine patch transdermal daily     Endocrine  #Diabetes: Unsure of diabetic history  -A1C 5.4  -Continue to monitor     Renal  -No active issues    GI  -No active issues    ID  #Community acquired pneumonia - CXR 8/23 showing some consolidation in L middle lobe  -ceftriaxone 1 g q24h x 5 days  -azithromycin 500 mg q24h x 3 days  -procal 0.15  -f/u 8/23 UA    Preventative Measures  E: replete as needed  N: DASH/TLC diet   DVT: SCDs, lovenox 40 mg subQ q24h  GI PPX: none Mr. Alexandrea Winter is a 55 year old male with past medical history significant for CAD (s/p proximal LAD stent in 2018), HLD, 40 pack year smoking history, history of aortic valve stenosis pending aortic valve replacement, presented 8/22 AM with ST elevations in inferior leads and an elevated trop of 0.88, now s/p cath and 1 STEPHENIE placed in OM, admitted to CCU for further monitoring and management.     Cardio  #Inferolateral STEMI   He was found to have ST elevations in inferior leads and an elevated trop of 0.88. In the cath lab, he was found to have 85% occlusion to his proximal RCA, 75% occlusion to his mid RCA, 50% occlusion in the left main artery, and 75% occlusion to LAD proximal. His OM1 was found to be 100% thrombosed, was determined to be the culprit lesion, and STENT was placed.   -C/w brillanta 90 mg BID  -C/w aspirin 81 mg daily  -c/w lipitor 80 mg at bedtime  -Echo 8/22- severe aortic stenosis. The peak transvalvular velocity is 3.52 m/s, the mean transvalvular gradient is 35 mmHg, and the LVOT/AV velocity ratio is 0.19. The peak transaortic gradient is 51 mmHg. The aortic valve area (estimated via the continuity method) is 0.6 cm². There is no evidence of aortic regurgitation. Mild mitral regurgitation.The right ventricle is normal in size. Right ventricular systolic function is normal.There is mild concentric left ventricular hypertrophy. Left ventricular systolic function is ikpuls-ag-hgygihjqyx reduced with a calculated ejection fraction of 40-45% with regional wall motion abnormalities. There is severe hypokinesis of mid inferolateral, mid anterolateral and basal inferior wall. No pericardial effusion.  -daily EKGs    #Severe aortic stenosis- pending aortic valve replacement   -CT surgery/interventional/structural team consulted 8/23 for severe aortic stenosis in setting of recent STEMI  -Pt. states he had been working his outpatient Cardiologist and a Surgeon to get set up for what seems like CABG/AVR prior to p/w STEMI.  -obtain room air ABG  -Obtain collateral from cardiologist on Monday     #HLD  -Lipid panel showed cholesterol 276, Direct , HDL: 34  -Continue with lipitor 80 mg daily    #HFrEF:   -Left ventricular systolic function is sxvujz-bz-wlyrvpnaiv reduced with a calculated ejection fraction of 40-45% with regional wall motion abnormalities.   CXRs showing pulmonary edema, fluid congestion  -Lasix 20mg IV given 8/23  -Start beta blocker when euvolemic- hold ACEI/ARB in setting of low BPs and severe AS  -Monitor I&O      Pulm  #Pulmonary Edema  -Lasix 20 mg IV given 8/23    #COPD-40 pack year smoking history with chronic cough  -On Duonebs Q6h and benzonatate 100 mg PO TID PRN for coughing  -f/u daily CXR  -nicotine patch transdermal daily     Endocrine  #Diabetes: Unsure of diabetic history  -A1C 5.4  -Continue to monitor     Renal  -No active issues    GI  -No active issues    ID  #Community acquired pneumonia   -New fevers overnight and CXR 8/23 showing some consolidation in L middle lobe  -ceftriaxone 1 g q24h x 5 days  -azithromycin 500 mg q24h x 3 days  -procal 0.15  -f/u 8/23 UA        Preventative Measures  E: replete as needed  N: DASH/TLC diet   DVT: SCDs, lovenox 40 mg subQ q24h  GI PPX: none

## 2020-08-23 NOTE — CONSULT NOTE ADULT - SUBJECTIVE AND OBJECTIVE BOX
Surgeon: Dr. Roque     Requesting Physician: Dr. Hutson    HISTORY OF PRESENT ILLNESS:  55y Male with a PMHx of CAD (s/p PCI to prox LA in 2018), HLD, 40 PY smoking hx, and AS who presented to [facility] ED on  with left sided chest pain, tightness, diaphoresis. He was found to have ST elevation in inferior leads and a troponin of 0.88. He was admitted under cardiology for a STEMI and was emergently brought to cath lab where he was found to have 85% occlusion to his proximal RCA, 75% occlusion to his mid RCA, 50% occlusion in the left main artery, 75% occlusion to LAD proximal, and OM1 100% thrombosed. A STEPHENIE was placed to OM1. An echo was obtained showing severe AS. Given patient's multi vessel CAD and AS CT surgery was consulted for surgical evaluation.      PAST MEDICAL & SURGICAL HISTORY:  Hypertension  Hypercholesteremia  Stented coronary artery  S/P appendectomy      MEDICATIONS  (STANDING):  albuterol/ipratropium for Nebulization 3 milliLiter(s) Nebulizer every 6 hours  aspirin  chewable 81 milliGRAM(s) Oral daily  atorvastatin 80 milliGRAM(s) Oral at bedtime  azithromycin  IVPB 500 milliGRAM(s) IV Intermittent every 24 hours  cefTRIAXone   IVPB 1000 milliGRAM(s) IV Intermittent every 24 hours  chlorhexidine 4% Liquid 1 Application(s) Topical <User Schedule>  enoxaparin Injectable 40 milliGRAM(s) SubCutaneous every 24 hours  melatonin 5 milliGRAM(s) Oral at bedtime  nicotine - 21 mG/24Hr(s) Patch 1 patch Transdermal daily  ticagrelor 90 milliGRAM(s) Oral every 12 hours    MEDICATIONS  (PRN):  acetaminophen   Tablet .. 650 milliGRAM(s) Oral every 6 hours PRN Temp greater or equal to 38C (100.4F), Moderate Pain (4 - 6)  benzonatate 100 milliGRAM(s) Oral three times a day PRN Coughing      Allergies    No Known Allergies    Intolerances        SOCIAL HISTORY:  Smoker:  YES / NO        PACK YEARS:                         WHEN QUIT?  ETOH use:  YES / NO               FREQUENCY / QUANTITY:  Ilicit Drug use:  YES / NO  Occupation:  Assisted device use (Cane / Walker):  Live with:    FAMILY HISTORY:  FH: hyperlipidemia  FH: coronary artery disease      Review of Systems (Need 10):  CONSTITUTIONAL: Denies fevers / chills, sweats, fatigue, weight loss, weight gain                                       NEURO:  Denies parathesias, seizures, syncope, confusion                                                                                  EYES:  Denies blurry vision, discharge, pain, loss of vision                                                                                    ENMT:  Denies difficulty hearing, vertigo, dysphagia, epistaxis, recent dental work                                       CV:  Denies chest pain, palpitations, MANCIA, orthopnea                                                                                           RESPIRATORY:  Denies wWheezing, SOB, cough / sputum, hemoptysis                                                               GI:  Denies nausea, vomiting, diarrhea, constipation, melena                                                                          : Denies hematuria, dysuria, urgency, incontinence                                                                                          MUSKULOSKELETAL:  Denies arthritis, joint swelling, muscle weakness                                                             SKIN/BREAST:  Denies rash, itching, hair loss, masses                                                                                              PSYCH:  Denies depression, anxiety, suicidal ideation                                                                                                HEME/LYMPH:  Denies bruises easily, enlarged lymph nodes, tender lymph nodes                                          ENDOCRINE:  Denies cold intolerance, heat intolerance, polydipsia                                                                      Vital Signs Last 24 Hrs  T(C): 37.5 (23 Aug 2020 12:00), Max: 38.4 (22 Aug 2020 21:00)  T(F): 99.5 (23 Aug 2020 12:00), Max: 101.1 (22 Aug 2020 21:00)  HR: 101 (23 Aug 2020 13:00) (83 - 120)  BP: 95/56 (23 Aug 2020 13:00) (86/58 - 115/58)  BP(mean): 70 (23 Aug 2020 13:00) (67 - 82)  RR: 39 (23 Aug 2020 13:00) (21 - 42)  SpO2: 94% (23 Aug 2020 13:00) (94% - 100%)    Physical Exam (Need 8)  CONSTITUTIONAL:                                                                          WNL  NEURO:                                                                                             WNL                      EYES:                                                                                                  WNL  ENMT:                                                                                                WNL  CV:                                                                                                      WNL  RESPIRATORY:                                                                                  WNL  GI:                                                                                                       WNL  : MORA + / -                                                                                 WNL  MUSKULOSKELETAL:                                                                       WNL  SKIN / BREAST:                                                                                 WNL                                                          LABS:                        13.1   11.29 )-----------( 160      ( 23 Aug 2020 04:29 )             39.5     08-23    137  |  101  |  17  ----------------------------<  134<H>  4.0   |  25  |  0.83    Ca    9.0      23 Aug 2020 04:29  Phos  3.9     08-23  Mg     1.8     -    TPro  7.0  /  Alb  3.6  /  TBili  0.8  /  DBili  x   /  AST  146<H>  /  ALT  51<H>  /  AlkPhos  62      PT/INR - ( 22 Aug 2020 09:08 )   PT: 14.7 sec;   INR: 1.24          PTT - ( 22 Aug 2020 12:08 )  PTT:33.7 sec  Urinalysis Basic - ( 23 Aug 2020 11:22 )    Color: Yellow / Appearance: Clear / S.015 / pH: x  Gluc: x / Ketone: NEGATIVE  / Bili: Negative / Urobili: 0.2 E.U./dL   Blood: x / Protein: NEGATIVE mg/dL / Nitrite: NEGATIVE   Leuk Esterase: NEGATIVE / RBC: < 5 /HPF / WBC < 5 /HPF   Sq Epi: x / Non Sq Epi: 0-5 /HPF / Bacteria: Present /HPF      CARDIAC MARKERS ( 22 Aug 2020 05:56 )  x     / 0.88 ng/mL / x     / x     / x              RADIOLOGY & ADDITIONAL STUDIES:  CAROTID U/S:    CXR:    CT Scan:    EKG:    TTE / BERKLEY:  < from: TTE Echo Complete w/o Contrast w/ Doppler (20 @ 09:48) >  CONCLUSIONS:     1. The aortic valve is calcified (morphology not well seen). There is severe aortic stenosis. The peak transvalvular velocity is 3.52 m/s, the mean transvalvular gradient is 35 mmHg, and the LVOT/AV velocity ratio is 0.19. The peak transaortic gradient is 51 mmHg. The aortic valve area (estimated via the continuity method) is 0.6 cm². There is no evidence of aortic regurgitation.   2. Mild mitral regurgitation.   3. The right ventricle is normal in size. Right ventricular systolic function is normal.   4. There is mild concentric left ventricular hypertrophy. Left ventricular systolic function is uzoyua-vp-ibewfbmdwr reduced with a calculated ejection fraction of 40-45% with regional wall motion abnormalities. There is severe hypokinesis of mid inferolateral, mid anterolateral and basal inferior wall.   5. No pericardial effusion.    < end of copied text >      Cardiac Cath: Surgeon: Dr. Roque     Requesting Physician: Dr. Hutson    HISTORY OF PRESENT ILLNESS:  55y Male with a PMHx of CAD (s/p PCI to prox LA in 2018), HLD, current smoker (40 PY smoking hx), and AS who presented to [facility] ED on  with left sided chest pain, tightness, diaphoresis. He was found to have ST elevation in inferior leads and a troponin of 0.88. He was admitted under cardiology for a STEMI and was emergently brought to cath lab where he was found to have 85% occlusion to his proximal RCA, 75% occlusion to his mid RCA, 50% occlusion in the left main artery, 75% occlusion to LAD proximal, and OM1 100% thrombosed. A STEPHENIE was placed to OM1. An echo was obtained showing severe AS. Given patient's multi vessel CAD and AS CT surgery (Dr. Mitchell) was consulted for surgical evaluation.      PAST MEDICAL & SURGICAL HISTORY:  Hypertension  Hypercholesteremia  Stented coronary artery  S/P appendectomy      MEDICATIONS  (STANDING):  albuterol/ipratropium for Nebulization 3 milliLiter(s) Nebulizer every 6 hours  aspirin  chewable 81 milliGRAM(s) Oral daily  atorvastatin 80 milliGRAM(s) Oral at bedtime  azithromycin  IVPB 500 milliGRAM(s) IV Intermittent every 24 hours  cefTRIAXone   IVPB 1000 milliGRAM(s) IV Intermittent every 24 hours  chlorhexidine 4% Liquid 1 Application(s) Topical <User Schedule>  enoxaparin Injectable 40 milliGRAM(s) SubCutaneous every 24 hours  melatonin 5 milliGRAM(s) Oral at bedtime  nicotine - 21 mG/24Hr(s) Patch 1 patch Transdermal daily  ticagrelor 90 milliGRAM(s) Oral every 12 hours    MEDICATIONS  (PRN):  acetaminophen   Tablet .. 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  Assisted device use (Cane / Walker): No  Live with: Self    FAMILY HISTORY:  FH: hyperlipidemia  FH: coronary artery disease      Review of Systems:  CONSTITUTIONAL: Denies fevers / chills, sweats, fatigue, weight loss, weight gain                                       NEURO:  Denies parathesias, seizures, syncope, confusion                                                                                  EYES:  Denies blurry vision, discharge, pain, loss of vision                                                                                    ENMT:  Denies difficulty hearing, vertigo, dysphagia, epistaxis, recent dental work                                       CV:  Endorses some MANCIA. Denies chest pain, palpitations, orthopnea                                                                                           RESPIRATORY:  Endorses SOB. Denies wheezing, cough / sputum, hemoptysis                                                               GI:  Denies nausea, vomiting, diarrhea, constipation, melena                                                                          : Denies hematuria, dysuria, urgency, incontinence                                                                                          MUSKULOSKELETAL:  Denies arthritis, joint swelling, muscle weakness                                                             SKIN/BREAST:  Denies rash, itching, hair loss, masses                                                                                              PSYCH:  Denies depression, anxiety, suicidal ideation                                                                                                HEME/LYMPH:  Denies bruises easily, enlarged lymph nodes, tender lymph nodes                                          ENDOCRINE:  Denies cold intolerance, heat intolerance, polydipsia                                                                      Vital Signs Last 24 Hrs  T(C): 37.5 (23 Aug 2020 12:00), Max: 38.4 (22 Aug 2020 21:00)  T(F): 99.5 (23 Aug 2020 12:00), Max: 101.1 (22 Aug 2020 21:00)  HR: 101 (23 Aug 2020 13:00) (83 - 120)  BP: 95/56 (23 Aug 2020 13:00) (86/58 - 115/58)  BP(mean): 70 (23 Aug 2020 13:00) (67 - 82)  RR: 39 (23 Aug 2020 13:00) (21 - 42)  SpO2: 94% (23 Aug 2020 13:00) (94% - 100%)    Physical Exam  CONSTITUTIONAL: Well appearing in NAD assessed laying comfortably in bed   NEURO: A&OX3. No focal deficits noted, moving bilateral upper and lower extremities                   EYES: PERRLA  ENMT: Neck supple  CV: RRR, no murmurs, rubs, gallops  RESPIRATORY: Clear to auscultation bilateral anterior lung fields, no wheezes, rales, rhonchi   GI:  +BS, NT/ND  : No ritchie  MUSKULOSKELETAL: No peripheral edema or calf tenderness. Full strength and ROM bilateral upper and lower extremities   SKIN / BREAST: no incisions or rashes                                                             LABS:                        13.1   11.29 )-----------( 160      ( 23 Aug 2020 04:29 )             39.5     08    137  |  101  |  17  ----------------------------<  134<H>  4.0   |  25  |  0.83    Ca    9.0      23 Aug 2020 04:29  Phos  3.9       Mg     1.8         TPro  7.0  /  Alb  3.6  /  TBili  0.8  /  DBili  x   /  AST  146<H>  /  ALT  51<H>  /  AlkPhos  62      PT/INR - ( 22 Aug 2020 09:08 )   PT: 14.7 sec;   INR: 1.24          PTT - ( 22 Aug 2020 12:08 )  PTT:33.7 sec  Urinalysis Basic - ( 23 Aug 2020 11:22 )    Color: Yellow / Appearance: Clear / S.015 / pH: x  Gluc: x / Ketone: NEGATIVE  / Bili: Negative / Urobili: 0.2 E.U./dL   Blood: x / Protein: NEGATIVE mg/dL / Nitrite: NEGATIVE   Leuk Esterase: NEGATIVE / RBC: < 5 /HPF / WBC < 5 /HPF   Sq Epi: x / Non Sq Epi: 0-5 /HPF / Bacteria: Present /HPF      CARDIAC MARKERS ( 22 Aug 2020 05:56 )  x     / 0.88 ng/mL / x     / x     / x        RADIOLOGY & ADDITIONAL STUDIES:  CAROTID U/S: PENDING    CXR:  < from: Xray Chest 1 View- PORTABLE-Routine (20 @ 05:35) >  INTERPRETATION:  Clinical History: Chest pain    Frontal examination of the chest demonstrates cardiomegaly. Congestion and/or infiltrates. No significant change lung pathology noted in comparison to prior examination of the chest 2020 Visualized osseous structures are within normal limits.    IMPRESSION: Congestion and/or infiltrates    EKG:  IN CHART    TTE / BERKLEY:  < from: TTE Echo Complete w/o Contrast w/ Doppler (20 @ 09:48) >  CONCLUSIONS:     1. The aortic valve is calcified (morphology not well seen). There is severe aortic stenosis. The peak transvalvular velocity is 3.52 m/s, the mean transvalvular gradient is 35 mmHg, and the LVOT/AV velocity ratio is 0.19. The peak transaortic gradient is 51 mmHg. The aortic valve area (estimated via the continuity method) is 0.6 cm². There is no evidence of aortic regurgitation.   2. Mild mitral regurgitation.   3. The right ventricle is normal in size. Right ventricular systolic function is normal.   4. There is mild concentric left ventricular hypertrophy. Left ventricular systolic function is oqyypu-od-pofzipljni reduced with a calculated ejection fraction of 40-45% with regional wall motion abnormalities. There is severe hypokinesis of mid inferolateral, mid anterolateral and basal inferior wall.   5. No pericardial effusion.    < end of copied text >      Cardiac Cath:

## 2020-08-23 NOTE — PROGRESS NOTE ADULT - SUBJECTIVE AND OBJECTIVE BOX
INTERVAL HPI/OVERNIGHT EVENTS:    SUBJECTIVE: Patient seen and examined at bedside.    OBJECTIVE:    VITAL SIGNS:  ICU Vital Signs Last 24 Hrs  T(C): 37.9 (23 Aug 2020 04:05), Max: 38.4 (22 Aug 2020 21:00)  T(F): 100.3 (23 Aug 2020 04:05), Max: 101.1 (22 Aug 2020 21:00)  HR: 102 (23 Aug 2020 07:00) (83 - 120)  BP: 99/57 (23 Aug 2020 07:00) (87/57 - 115/58)  BP(mean): 72 (23 Aug 2020 07:00) (67 - 85)  ABP: --  ABP(mean): --  RR: 35 (23 Aug 2020 07:00) (19 - 42)  SpO2: 96% (23 Aug 2020 07:00) (93% - 100%)        08-22 @ 07:01  -  08-23 @ 07:00  --------------------------------------------------------  IN: 680 mL / OUT: 2300 mL / NET: -1620 mL      CAPILLARY BLOOD GLUCOSE          PHYSICAL EXAM:      MEDICATIONS:  MEDICATIONS  (STANDING):  albuterol/ipratropium for Nebulization 3 milliLiter(s) Nebulizer every 6 hours  aspirin  chewable 81 milliGRAM(s) Oral daily  atorvastatin 80 milliGRAM(s) Oral at bedtime  chlorhexidine 4% Liquid 1 Application(s) Topical <User Schedule>  enoxaparin Injectable 40 milliGRAM(s) SubCutaneous every 24 hours  melatonin 5 milliGRAM(s) Oral at bedtime  nicotine - 21 mG/24Hr(s) Patch 1 patch Transdermal daily  ticagrelor 90 milliGRAM(s) Oral every 12 hours    MEDICATIONS  (PRN):  acetaminophen   Tablet .. 650 milliGRAM(s) Oral every 6 hours PRN Temp greater or equal to 38C (100.4F), Moderate Pain (4 - 6)  benzonatate 100 milliGRAM(s) Oral three times a day PRN Coughing      ALLERGIES:  Allergies    No Known Allergies    Intolerances        LABS:                        13.1   11.29 )-----------( 160      ( 23 Aug 2020 04:29 )             39.5     08-23    137  |  101  |  17  ----------------------------<  134<H>  4.0   |  25  |  0.83    Ca    9.0      23 Aug 2020 04:29  Phos  3.9     08-23  Mg     1.8     08-23    TPro  7.0  /  Alb  3.6  /  TBili  0.8  /  DBili  x   /  AST  146<H>  /  ALT  51<H>  /  AlkPhos  62  08-23    PT/INR - ( 22 Aug 2020 09:08 )   PT: 14.7 sec;   INR: 1.24          PTT - ( 22 Aug 2020 12:08 )  PTT:33.7 sec      RADIOLOGY & ADDITIONAL TESTS: Reviewed. INTERVAL HPI/OVERNIGHT EVENTS:  Pt. with T 101F at 8/22 9 PM, given tylenol 650 mg. Blood cx. were drawn. Given nicotine patch (1-2 ppd smoker). Melatonin given. T of 100.3 F at 4 AM, given tylenol 650 mg.    SUBJECTIVE: Patient seen and examined at bedside. Reports that it hurts to take a deep breath, and coughs when doing so.  Denies any CP, fever, chills, nausea, vomiting, diarrhea, abdominal pain.  Reports constipation- last BM friday 9 AM.    OBJECTIVE:    VITAL SIGNS:  ICU Vital Signs Last 24 Hrs  T(C): 37.9 (23 Aug 2020 04:05), Max: 38.4 (22 Aug 2020 21:00)  T(F): 100.3 (23 Aug 2020 04:05), Max: 101.1 (22 Aug 2020 21:00)  HR: 102 (23 Aug 2020 07:00) (83 - 120)  BP: 99/57 (23 Aug 2020 07:00) (87/57 - 115/58)  BP(mean): 72 (23 Aug 2020 07:00) (67 - 85)  ABP: --  ABP(mean): --  RR: 35 (23 Aug 2020 07:00) (19 - 42)  SpO2: 96% (23 Aug 2020 07:00) (93% - 100%)        08-22 @ 07:01  -  08-23 @ 07:00  --------------------------------------------------------  IN: 680 mL / OUT: 2300 mL / NET: -1620 mL      CAPILLARY BLOOD GLUCOSE          PHYSICAL EXAM:  Gen: male breathing on 2 L nasal cannula oxygen  Heart:   Abdomen: Soft, NT, ND, no rebound tenderness or guarrding  Extremities: WWP, no peripheral edema    MEDICATIONS:  MEDICATIONS  (STANDING):  albuterol/ipratropium for Nebulization 3 milliLiter(s) Nebulizer every 6 hours  aspirin  chewable 81 milliGRAM(s) Oral daily  atorvastatin 80 milliGRAM(s) Oral at bedtime  chlorhexidine 4% Liquid 1 Application(s) Topical <User Schedule>  enoxaparin Injectable 40 milliGRAM(s) SubCutaneous every 24 hours  melatonin 5 milliGRAM(s) Oral at bedtime  nicotine - 21 mG/24Hr(s) Patch 1 patch Transdermal daily  ticagrelor 90 milliGRAM(s) Oral every 12 hours    MEDICATIONS  (PRN):  acetaminophen   Tablet .. 650 milliGRAM(s) Oral every 6 hours PRN Temp greater or equal to 38C (100.4F), Moderate Pain (4 - 6)  benzonatate 100 milliGRAM(s) Oral three times a day PRN Coughing      ALLERGIES:  Allergies    No Known Allergies    Intolerances        LABS:                        13.1   11.29 )-----------( 160      ( 23 Aug 2020 04:29 )             39.5     08-23    137  |  101  |  17  ----------------------------<  134<H>  4.0   |  25  |  0.83    Ca    9.0      23 Aug 2020 04:29  Phos  3.9     08-23  Mg     1.8     08-23    TPro  7.0  /  Alb  3.6  /  TBili  0.8  /  DBili  x   /  AST  146<H>  /  ALT  51<H>  /  AlkPhos  62  08-23    PT/INR - ( 22 Aug 2020 09:08 )   PT: 14.7 sec;   INR: 1.24          PTT - ( 22 Aug 2020 12:08 )  PTT:33.7 sec      RADIOLOGY & ADDITIONAL TESTS: Reviewed. INTERVAL HPI/OVERNIGHT EVENTS:  Pt. with T 101F at 8/22 9 PM, given tylenol 650 mg. Blood cx. were drawn. Given nicotine patch (1-2 ppd smoker). Melatonin given. T of 100.3 F at 4 AM, given tylenol 650 mg.    SUBJECTIVE: Patient seen and examined at bedside. Reports that it hurts to take a deep breath, and coughs when doing so.  Denies any CP, fever, chills, nausea, vomiting, diarrhea, abdominal pain.  Reports constipation- last BM friday 9 AM.    OBJECTIVE:    VITAL SIGNS:  ICU Vital Signs Last 24 Hrs  T(C): 37.9 (23 Aug 2020 04:05), Max: 38.4 (22 Aug 2020 21:00)  T(F): 100.3 (23 Aug 2020 04:05), Max: 101.1 (22 Aug 2020 21:00)  HR: 102 (23 Aug 2020 07:00) (83 - 120)  BP: 99/57 (23 Aug 2020 07:00) (87/57 - 115/58)  BP(mean): 72 (23 Aug 2020 07:00) (67 - 85)  ABP: --  ABP(mean): --  RR: 35 (23 Aug 2020 07:00) (19 - 42)  SpO2: 96% (23 Aug 2020 07:00) (93% - 100%)        08-22 @ 07:01  -  08-23 @ 07:00  --------------------------------------------------------  IN: 680 mL / OUT: 2300 mL / NET: -1620 mL      CAPILLARY BLOOD GLUCOSE      PHYSICAL EXAM:  Gen: WDWN male breathing on 2 L nasal cannula oxygen, NAD  HEENT: NC/AT, neck supple  CV:  +S1/S2; RRR; no M/R/G  Pulm: Wheezing appreciated bilaterally at both lung bases. No crackles.  Abdomen: Soft, NT, ND, no rebound tenderness or guarrding  Extremities: WWP, no peripheral edema  Neurologic: AAOx3; CNII-XII grossly intact; no focal deficits    MEDICATIONS:  MEDICATIONS  (STANDING):  albuterol/ipratropium for Nebulization 3 milliLiter(s) Nebulizer every 6 hours  aspirin  chewable 81 milliGRAM(s) Oral daily  atorvastatin 80 milliGRAM(s) Oral at bedtime  chlorhexidine 4% Liquid 1 Application(s) Topical <User Schedule>  enoxaparin Injectable 40 milliGRAM(s) SubCutaneous every 24 hours  melatonin 5 milliGRAM(s) Oral at bedtime  nicotine - 21 mG/24Hr(s) Patch 1 patch Transdermal daily  ticagrelor 90 milliGRAM(s) Oral every 12 hours    MEDICATIONS  (PRN):  acetaminophen   Tablet .. 650 milliGRAM(s) Oral every 6 hours PRN Temp greater or equal to 38C (100.4F), Moderate Pain (4 - 6)  benzonatate 100 milliGRAM(s) Oral three times a day PRN Coughing      ALLERGIES:  Allergies    No Known Allergies    Intolerances        LABS:                        13.1   11.29 )-----------( 160      ( 23 Aug 2020 04:29 )             39.5     08-23    137  |  101  |  17  ----------------------------<  134<H>  4.0   |  25  |  0.83    Ca    9.0      23 Aug 2020 04:29  Phos  3.9     08-23  Mg     1.8     08-23    TPro  7.0  /  Alb  3.6  /  TBili  0.8  /  DBili  x   /  AST  146<H>  /  ALT  51<H>  /  AlkPhos  62  08-23    PT/INR - ( 22 Aug 2020 09:08 )   PT: 14.7 sec;   INR: 1.24          PTT - ( 22 Aug 2020 12:08 )  PTT:33.7 sec      RADIOLOGY & ADDITIONAL TESTS: Reviewed.

## 2020-08-24 LAB
ALBUMIN SERPL ELPH-MCNC: 3.2 G/DL — LOW (ref 3.3–5)
ALP SERPL-CCNC: 68 U/L — SIGNIFICANT CHANGE UP (ref 40–120)
ALT FLD-CCNC: 51 U/L — HIGH (ref 10–45)
ANION GAP SERPL CALC-SCNC: 12 MMOL/L — SIGNIFICANT CHANGE UP (ref 5–17)
AST SERPL-CCNC: 74 U/L — HIGH (ref 10–40)
BASOPHILS # BLD AUTO: 0.02 K/UL — SIGNIFICANT CHANGE UP (ref 0–0.2)
BASOPHILS NFR BLD AUTO: 0.2 % — SIGNIFICANT CHANGE UP (ref 0–2)
BILIRUB SERPL-MCNC: 0.9 MG/DL — SIGNIFICANT CHANGE UP (ref 0.2–1.2)
BUN SERPL-MCNC: 20 MG/DL — SIGNIFICANT CHANGE UP (ref 7–23)
CALCIUM SERPL-MCNC: 9.4 MG/DL — SIGNIFICANT CHANGE UP (ref 8.4–10.5)
CHLORIDE SERPL-SCNC: 100 MMOL/L — SIGNIFICANT CHANGE UP (ref 96–108)
CO2 SERPL-SCNC: 25 MMOL/L — SIGNIFICANT CHANGE UP (ref 22–31)
CREAT SERPL-MCNC: 0.84 MG/DL — SIGNIFICANT CHANGE UP (ref 0.5–1.3)
EOSINOPHIL # BLD AUTO: 0.23 K/UL — SIGNIFICANT CHANGE UP (ref 0–0.5)
EOSINOPHIL NFR BLD AUTO: 2.4 % — SIGNIFICANT CHANGE UP (ref 0–6)
GLUCOSE SERPL-MCNC: 118 MG/DL — HIGH (ref 70–99)
HCT VFR BLD CALC: 39.1 % — SIGNIFICANT CHANGE UP (ref 39–50)
HGB BLD-MCNC: 13 G/DL — SIGNIFICANT CHANGE UP (ref 13–17)
IMM GRANULOCYTES NFR BLD AUTO: 0.5 % — SIGNIFICANT CHANGE UP (ref 0–1.5)
LYMPHOCYTES # BLD AUTO: 1.57 K/UL — SIGNIFICANT CHANGE UP (ref 1–3.3)
LYMPHOCYTES # BLD AUTO: 16.3 % — SIGNIFICANT CHANGE UP (ref 13–44)
MAGNESIUM SERPL-MCNC: 2 MG/DL — SIGNIFICANT CHANGE UP (ref 1.6–2.6)
MCHC RBC-ENTMCNC: 30 PG — SIGNIFICANT CHANGE UP (ref 27–34)
MCHC RBC-ENTMCNC: 33.2 GM/DL — SIGNIFICANT CHANGE UP (ref 32–36)
MCV RBC AUTO: 90.1 FL — SIGNIFICANT CHANGE UP (ref 80–100)
MONOCYTES # BLD AUTO: 0.75 K/UL — SIGNIFICANT CHANGE UP (ref 0–0.9)
MONOCYTES NFR BLD AUTO: 7.8 % — SIGNIFICANT CHANGE UP (ref 2–14)
NEUTROPHILS # BLD AUTO: 7.01 K/UL — SIGNIFICANT CHANGE UP (ref 1.8–7.4)
NEUTROPHILS NFR BLD AUTO: 72.8 % — SIGNIFICANT CHANGE UP (ref 43–77)
NRBC # BLD: 0 /100 WBCS — SIGNIFICANT CHANGE UP (ref 0–0)
PHOSPHATE SERPL-MCNC: 3.1 MG/DL — SIGNIFICANT CHANGE UP (ref 2.5–4.5)
PLATELET # BLD AUTO: 184 K/UL — SIGNIFICANT CHANGE UP (ref 150–400)
POTASSIUM SERPL-MCNC: 4 MMOL/L — SIGNIFICANT CHANGE UP (ref 3.5–5.3)
POTASSIUM SERPL-SCNC: 4 MMOL/L — SIGNIFICANT CHANGE UP (ref 3.5–5.3)
PROT SERPL-MCNC: 6.9 G/DL — SIGNIFICANT CHANGE UP (ref 6–8.3)
RBC # BLD: 4.34 M/UL — SIGNIFICANT CHANGE UP (ref 4.2–5.8)
RBC # FLD: 12.8 % — SIGNIFICANT CHANGE UP (ref 10.3–14.5)
SODIUM SERPL-SCNC: 137 MMOL/L — SIGNIFICANT CHANGE UP (ref 135–145)
WBC # BLD: 9.63 K/UL — SIGNIFICANT CHANGE UP (ref 3.8–10.5)
WBC # FLD AUTO: 9.63 K/UL — SIGNIFICANT CHANGE UP (ref 3.8–10.5)

## 2020-08-24 PROCEDURE — 71046 X-RAY EXAM CHEST 2 VIEWS: CPT | Mod: 26

## 2020-08-24 PROCEDURE — 99232 SBSQ HOSP IP/OBS MODERATE 35: CPT

## 2020-08-24 PROCEDURE — 71045 X-RAY EXAM CHEST 1 VIEW: CPT | Mod: 26,59

## 2020-08-24 PROCEDURE — 94010 BREATHING CAPACITY TEST: CPT | Mod: 26

## 2020-08-24 PROCEDURE — 99291 CRITICAL CARE FIRST HOUR: CPT

## 2020-08-24 RX ORDER — FUROSEMIDE 40 MG
10 TABLET ORAL ONCE
Refills: 0 | Status: COMPLETED | OUTPATIENT
Start: 2020-08-24 | End: 2020-08-24

## 2020-08-24 RX ORDER — METOPROLOL TARTRATE 50 MG
12.5 TABLET ORAL ONCE
Refills: 0 | Status: COMPLETED | OUTPATIENT
Start: 2020-08-24 | End: 2020-08-24

## 2020-08-24 RX ORDER — POLYETHYLENE GLYCOL 3350 17 G/17G
17 POWDER, FOR SOLUTION ORAL DAILY
Refills: 0 | Status: DISCONTINUED | OUTPATIENT
Start: 2020-08-24 | End: 2020-08-31

## 2020-08-24 RX ORDER — SENNA PLUS 8.6 MG/1
1 TABLET ORAL ONCE
Refills: 0 | Status: COMPLETED | OUTPATIENT
Start: 2020-08-24 | End: 2020-08-24

## 2020-08-24 RX ADMIN — POLYETHYLENE GLYCOL 3350 17 GRAM(S): 17 POWDER, FOR SOLUTION ORAL at 17:51

## 2020-08-24 RX ADMIN — Medication 3 MILLILITER(S): at 16:20

## 2020-08-24 RX ADMIN — Medication 1 PATCH: at 11:32

## 2020-08-24 RX ADMIN — Medication 3 MILLILITER(S): at 21:28

## 2020-08-24 RX ADMIN — AZITHROMYCIN 255 MILLIGRAM(S): 500 TABLET, FILM COATED ORAL at 10:06

## 2020-08-24 RX ADMIN — ENOXAPARIN SODIUM 40 MILLIGRAM(S): 100 INJECTION SUBCUTANEOUS at 20:55

## 2020-08-24 RX ADMIN — SENNA PLUS 1 TABLET(S): 8.6 TABLET ORAL at 12:24

## 2020-08-24 RX ADMIN — Medication 12.5 MILLIGRAM(S): at 16:19

## 2020-08-24 RX ADMIN — Medication 650 MILLIGRAM(S): at 18:12

## 2020-08-24 RX ADMIN — Medication 1 PATCH: at 11:33

## 2020-08-24 RX ADMIN — TICAGRELOR 90 MILLIGRAM(S): 90 TABLET ORAL at 06:46

## 2020-08-24 RX ADMIN — Medication 3 MILLILITER(S): at 05:00

## 2020-08-24 RX ADMIN — TICAGRELOR 90 MILLIGRAM(S): 90 TABLET ORAL at 17:04

## 2020-08-24 RX ADMIN — ATORVASTATIN CALCIUM 80 MILLIGRAM(S): 80 TABLET, FILM COATED ORAL at 20:56

## 2020-08-24 RX ADMIN — Medication 81 MILLIGRAM(S): at 11:32

## 2020-08-24 RX ADMIN — Medication 1 PATCH: at 08:52

## 2020-08-24 RX ADMIN — Medication 650 MILLIGRAM(S): at 17:51

## 2020-08-24 RX ADMIN — CEFTRIAXONE 100 MILLIGRAM(S): 500 INJECTION, POWDER, FOR SOLUTION INTRAMUSCULAR; INTRAVENOUS at 08:47

## 2020-08-24 RX ADMIN — Medication 1 PATCH: at 18:12

## 2020-08-24 RX ADMIN — Medication 5 MILLIGRAM(S): at 20:56

## 2020-08-24 RX ADMIN — CHLORHEXIDINE GLUCONATE 1 APPLICATION(S): 213 SOLUTION TOPICAL at 07:16

## 2020-08-24 RX ADMIN — Medication 10 MILLIGRAM(S): at 10:38

## 2020-08-24 RX ADMIN — Medication 3 MILLILITER(S): at 09:35

## 2020-08-24 NOTE — PROGRESS NOTE ADULT - SUBJECTIVE AND OBJECTIVE BOX
Patient discussed on morning rounds with Dr. Mitchell      SUBJECTIVE ASSESSMENT:  Patient seen this afternoon at bedside, doing well and not offering any complaints at this time. Denies any chest pain or abdominal pain at this time. Does endorse shortness of breath with activity.     Vital Signs Last 24 Hrs  T(C): 36.4 (24 Aug 2020 15:00), Max: 39 (23 Aug 2020 18:00)  T(F): 97.5 (24 Aug 2020 15:00), Max: 102.2 (23 Aug 2020 18:00)  HR: 107 (24 Aug 2020 16:00) (95 - 129)  BP: 103/50 (24 Aug 2020 16:00) (81/51 - 120/73)  BP(mean): 71 (24 Aug 2020 16:00) (62 - 90)  RR: 31 (24 Aug 2020 16:00) (22 - 48)  SpO2: 96% (24 Aug 2020 16:00) (92% - 99%)  I&O's Detail    23 Aug 2020 07:01  -  24 Aug 2020 07:00  --------------------------------------------------------  IN:    IV PiggyBack: 300 mL    Oral Fluid: 320 mL  Total IN: 620 mL    OUT:    Voided: 2350 mL  Total OUT: 2350 mL    Total NET: -1730 mL      24 Aug 2020 07:01  -  24 Aug 2020 16:05  --------------------------------------------------------  IN:    Oral Fluid: 100 mL    Solution: 50 mL    Solution: 250 mL  Total IN: 400 mL    OUT:    Voided: 600 mL  Total OUT: 600 mL    Total NET: -200 mL    PHYSICAL EXAM:    General: Patient lying comfortably in bed, no acute distress     Neurological: Alert and oriented. No focal neurological deficits     Cardiovascular: S1S2, RRR, grade II/IV systolic murmur appreciated at LUSB     Respiratory: + Tachypnea. Clear to ausculation bilaterally     Gastrointestinal: Abdomen soft, non tender, non distended     Extremities: Warm and well perfused. No edema or calf tenderness     Vascular: Peripheral pulses 2+ bilaterally     LABS:                        13.0   9.63  )-----------( 184      ( 24 Aug 2020 05:18 )             39.1       COUMADIN: No            137  |  100  |  20  ----------------------------<  118<H>  4.0   |  25  |  0.84    Ca    9.4      24 Aug 2020 05:18  Phos  3.1       Mg     2.0         TPro  6.9  /  Alb  3.2<L>  /  TBili  0.9  /  DBili  x   /  AST  74<H>  /  ALT  51<H>  /  AlkPhos  68        Urinalysis Basic - ( 23 Aug 2020 11:22 )    Color: Yellow / Appearance: Clear / S.015 / pH: x  Gluc: x / Ketone: NEGATIVE  / Bili: Negative / Urobili: 0.2 E.U./dL   Blood: x / Protein: NEGATIVE mg/dL / Nitrite: NEGATIVE   Leuk Esterase: NEGATIVE / RBC: < 5 /HPF / WBC < 5 /HPF   Sq Epi: x / Non Sq Epi: 0-5 /HPF / Bacteria: Present /HPF        MEDICATIONS  (STANDING):  albuterol/ipratropium for Nebulization 3 milliLiter(s) Nebulizer every 6 hours  aspirin  chewable 81 milliGRAM(s) Oral daily  atorvastatin 80 milliGRAM(s) Oral at bedtime  azithromycin  IVPB 500 milliGRAM(s) IV Intermittent every 24 hours  cefTRIAXone   IVPB 1000 milliGRAM(s) IV Intermittent every 24 hours  chlorhexidine 4% Liquid 1 Application(s) Topical <User Schedule>  enoxaparin Injectable 40 milliGRAM(s) SubCutaneous every 24 hours  melatonin 5 milliGRAM(s) Oral at bedtime  nicotine - 21 mG/24Hr(s) Patch 1 patch Transdermal daily  ticagrelor 90 milliGRAM(s) Oral every 12 hours    MEDICATIONS  (PRN):  acetaminophen   Tablet .. 650 milliGRAM(s) Oral every 6 hours PRN Temp greater or equal to 38C (100.4F), Moderate Pain (4 - 6)  benzonatate 100 milliGRAM(s) Oral three times a day PRN Coughing  polyethylene glycol 3350 17 Gram(s) Oral daily PRN Constipation        RADIOLOGY & ADDITIONAL TESTS:

## 2020-08-24 NOTE — PROGRESS NOTE ADULT - ASSESSMENT
Mr. Alexandrea Winter is a 55 year old male with past medical history significant for CAD (s/p proximal LAD stent in 2018), HLD, 40 pack year smoking history, history of aortic valve stenosis pending aortic valve replacement, presented 8/22 AM with ST elevations in inferior leads and an elevated trop of 0.88, now s/p cath and 1 STEPHENIE placed in OM, admitted to CCU for further monitoring and management.     Cardio  #Inferolateral STEMI   He was found to have ST elevations in inferior leads and an elevated trop of 0.88. In the cath lab, he was found to have 85% occlusion to his proximal RCA, 75% occlusion to his mid RCA, 50% occlusion in the left main artery, and 75% occlusion to LAD proximal. His OM1 was found to be 100% thrombosed, was determined to be the culprit lesion, and STENT was placed.   -C/w brillanta 90 mg BID  -C/w aspirin 81 mg daily  -c/w lipitor 80 mg at bedtime  -f/u daily EKGs    #Severe aortic stenosis- pending aortic valve replacement   -f/u CT surgery/interventional/structural team recommendations  -ECHO 8/23 demonstrating discrepancy between velocity and gradient in regards to severity of AS, will clarify      #HLD  -Lipid panel showed cholesterol 276, Direct , HDL: 34  -Continue with lipitor 80 mg daily    #HFrEF:   -Left ventricular systolic function is jumwhg-dp-xzjeptkiid reduced with a calculated ejection fraction of 40-45% with regional wall motion abnormalities.   CXRs showing pulmonary edema, fluid congestion  -Lasix 10 mg IV today  -Monitor BP  -Monitor I&O      Pulm  #Pulmonary Edema  -Lasix 10 mg IV today    #COPD-40 pack year smoking history with chronic cough  -On Duonebs Q6h and benzonatate 100 mg PO TID PRN for coughing  -f/u daily CXR  -nicotine patch transdermal daily     Endocrine  -No active issues    Renal  -No active issues    GI  -No active issues    ID  #Community acquired pneumonia: no fevers since 8/23 AM  -CXR 8/23 showing some consolidation in L middle lobe  -ceftriaxone 1 g q24h x 5 days  -azithromycin 500 mg q24h x 3 days          Preventative Measures  E: replete as needed  N: DASH/TLC diet   DVT: SCDs, lovenox 40 mg subQ q24h  GI PPX: none

## 2020-08-24 NOTE — CONSULT NOTE ADULT - ASSESSMENT
Assesment:  55y M w/     Plan:  Problem 1: Multi vessel CAD  - Pt is s/p STEMI with STEPHENIE to OM1, continue ASA, brillinta, atorvastatin per primary team  - AVR/CABG, but require cessation of brilinta prior to CPB  - monitor symptoms now that culprit lesion fixed    Problem 2: Severe AS  - bicuspid etiology likely  - will need CT scan for aorta and surgical planning    Problem 3: Pulmonary edema  - Per primary team note was requiring NC, now on RA  - Continue diuresis per primary team  - euvolemic on exam    Problem 4: Smoking cessation  - Pt has significant smoking hx  - Counseling on cessation per primary team     I have reviewed clinical labs tests and reports, radiology tests and reports, as well as old patient medical records, and discussed with the refering physician.
Assesment:  55y Male with a PMHx of CAD (s/p PCI to prox LA in 2018), HLD, current smoker (40 PY smoking hx), and AS who presented to [facility] ED on 8/22 with left sided chest pain, tightness, diaphoresis. He was found to have ST elevation in inferior leads and a troponin of 0.88. He was admitted under cardiology for a STEMI and was emergently brought to cath lab where he was found to have 85% occlusion to his proximal RCA, 75% occlusion to his mid RCA, 50% occlusion in the left main artery, 75% occlusion to LAD proximal, and OM1 100% thrombosed. A STEPHENIE was placed to OM1. An echo was obtained showing severe AS. Given patient's multi vessel CAD and AS CT surgery was consulted for surgical evaluation.    Plan:  Problem 1: Multi vessel CAD  - Case discussed with Dr. Roque, plan pending discussion with Dr. Mitchell   - Please complete pre-operative workup: CBC, CMP, Coags, Lipid Panel, TSH, Hemoglobin A1c, Pro-BNP, Cardiac Enzymes, Type & Screen x 2,  Room air ABG, UA, Carotid US, TTE, CXR Pa/Lat, EKG, Bedside PFTS  - Pt is s/p STEMI with STEPHENIE to OM1, continue ASA, brillinta, atorvastatin per primary team    Problem 2: Severe AS  - Plan pending discussion with Dr. Mitchell   - Pt with severe AS on echo, per patient is known to him   - Was undergoing workup at [facility] for possible surgical intervention  - Continue diuresis per primary team    Problem 3: Pulmonary edema  - Per primary team note was requiring NC, now on RA  - Continue diuresis per primary team    Problem 4: Smoking cessation  - Pt has significant smoking hx  - Counseling on cessation per primary team     I have reviewed clinical labs tests and reports, radiology tests and reports, as well as old patient medical records, and discussed with the refering physician.

## 2020-08-24 NOTE — CONSULT NOTE ADULT - SUBJECTIVE AND OBJECTIVE BOX
Surgeon: Dr. Landers     Requesting Physician: Dr. Skaggs    HISTORY OF PRESENT ILLNESS:  55y Male with a PMHx of CAD (s/p PCI to prox LA in 2018), HLD, current smoker (40 PY smoking hx), and AS who presented to [facility] ED on  with left sided chest pain, tightness, diaphoresis. He was found to have ST elevation in inferior leads and a troponin of 0.88. He was admitted under cardiology for a STEMI and was emergently brought to cath lab where he was found to have 85% occlusion to his proximal RCA, 75% occlusion to his mid RCA, 50% occlusion in the left main artery, 75% occlusion to LAD proximal, and OM1 100% thrombosed. A STEPHENIE was placed to OM1. An echo was obtained showing severe AS. Given patient's multi vessel CAD and AS CT surgery (Dr. Mitchell) was consulted for surgical evaluation.  Patient denies any CP/SOb, but has not been ambulating since STEMI. He has been seen at [facility] and [facility] for eval of his AS    PAST MEDICAL & SURGICAL HISTORY:  Hypertension  Hypercholesteremia  Stented coronary artery  S/P appendectomy      MEDICATIONS  (STANDING):  albuterol/ipratropium for Nebulization 3 milliLiter(s) Nebulizer every 6 hours  aspirin  chewable 81 milliGRAM(s) Oral daily  atorvastatin 80 milliGRAM(s) Oral at bedtime  azithromycin  IVPB 500 milliGRAM(s) IV Intermittent every 24 hours  cefTRIAXone   IVPB 1000 milliGRAM(s) IV Intermittent every 24 hours  chlorhexidine 4% Liquid 1 Application(s) Topical <User Schedule>  enoxaparin Injectable 40 milliGRAM(s) SubCutaneous every 24 hours  melatonin 5 milliGRAM(s) Oral at bedtime  nicotine - 21 mG/24Hr(s) Patch 1 patch Transdermal daily  ticagrelor 90 milliGRAM(s) Oral every 12 hours    MEDICATIONS  (PRN):  acetaminophen   Tablet .. 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  Assisted device use (Cane / Walker): No  Live with: Self    FAMILY HISTORY:  FH: hyperlipidemia  FH: coronary artery disease      Review of Systems:  CONSTITUTIONAL: Denies fevers / chills, sweats, fatigue, weight loss, weight gain                                       NEURO:  Denies parathesias, seizures, syncope, confusion                                                                                  EYES:  Denies blurry vision, discharge, pain, loss of vision                                                                                    ENMT:  Denies difficulty hearing, vertigo, dysphagia, epistaxis, recent dental work                                       CV:  Endorses some MANCIA. Denies chest pain, palpitations, orthopnea                                                                                           RESPIRATORY:  Endorses SOB. Denies wheezing, cough / sputum, hemoptysis                                                               GI:  Denies nausea, vomiting, diarrhea, constipation, melena                                                                          : Denies hematuria, dysuria, urgency, incontinence                                                                                          MUSKULOSKELETAL:  Denies arthritis, joint swelling, muscle weakness                                                             SKIN/BREAST:  Denies rash, itching, hair loss, masses                                                                                              PSYCH:  Denies depression, anxiety, suicidal ideation                                                                                                HEME/LYMPH:  Denies bruises easily, enlarged lymph nodes, tender lymph nodes                                          ENDOCRINE:  Denies cold intolerance, heat intolerance, polydipsia                                                                      Vital Signs Last 24 Hrs  T(C): 37.5 (23 Aug 2020 12:00), Max: 38.4 (22 Aug 2020 21:00)  T(F): 99.5 (23 Aug 2020 12:00), Max: 101.1 (22 Aug 2020 21:00)  HR: 101 (23 Aug 2020 13:00) (83 - 120)  BP: 95/56 (23 Aug 2020 13:00) (86/58 - 115/58)  BP(mean): 70 (23 Aug 2020 13:00) (67 - 82)  RR: 39 (23 Aug 2020 13:) (21 - 42)  SpO2: 94% (23 Aug 2020 13:) (94% - 100%)    Physical Exam  CONSTITUTIONAL: Well appearing in NAD assessed laying comfortably in bed   NEURO: A&OX3. No focal deficits noted, moving bilateral upper and lower extremities                   EYES: PERRLA  ENMT: Neck supple  CV: +ROYCE, RRR  RESPIRATORY: Clear to auscultation bilateral anterior lung fields, no wheezes, rales, rhonchi   GI:  +BS, NT/ND  : No ritchie  MUSKULOSKELETAL: No peripheral edema or calf tenderness. Full strength and ROM bilateral upper and lower extremities   SKIN / BREAST: no incisions or rashes                                                             LABS:                        13.1   11.29 )-----------( 160      ( 23 Aug 2020 04:29 )             39.5         137  |  101  |  17  ----------------------------<  134<H>  4.0   |  25  |  0.83    Ca    9.0      23 Aug 2020 04:29  Phos  3.9       Mg     1.8         TPro  7.0  /  Alb  3.6  /  TBili  0.8  /  DBili  x   /  AST  146<H>  /  ALT  51<H>  /  AlkPhos  62  23    PT/INR - ( 22 Aug 2020 09:08 )   PT: 14.7 sec;   INR: 1.24          PTT - ( 22 Aug 2020 12:08 )  PTT:33.7 sec  Urinalysis Basic - ( 23 Aug 2020 11:22 )    Color: Yellow / Appearance: Clear / S.015 / pH: x  Gluc: x / Ketone: NEGATIVE  / Bili: Negative / Urobili: 0.2 E.U./dL   Blood: x / Protein: NEGATIVE mg/dL / Nitrite: NEGATIVE   Leuk Esterase: NEGATIVE / RBC: < 5 /HPF / WBC < 5 /HPF   Sq Epi: x / Non Sq Epi: 0-5 /HPF / Bacteria: Present /HPF      CARDIAC MARKERS ( 22 Aug 2020 05:56 )  x     / 0.88 ng/mL / x     / x     / x        RADIOLOGY & ADDITIONAL STUDIES:  CAROTID U/S: PENDING    CXR:  < from: Xray Chest 1 View- PORTABLE-Routine (20 @ 05:35) >  INTERPRETATION:  Clinical History: Chest pain    Frontal examination of the chest demonstrates cardiomegaly. Congestion and/or infiltrates. No significant change lung pathology noted in comparison to prior examination of the chest 2020 Visualized osseous structures are within normal limits.    IMPRESSION: Congestion and/or infiltrates    EKG:  IN CHART    TTE / BERKLEY:  < from: TTE Echo Complete w/o Contrast w/ Doppler (20 @ 09:48) >  CONCLUSIONS:     1. The aortic valve is calcified (morphology not well seen). There is severe aortic stenosis. The peak transvalvular velocity is 3.52 m/s, the mean transvalvular gradient is 35 mmHg, and the LVOT/AV velocity ratio is 0.19. The peak transaortic gradient is 51 mmHg. The aortic valve area (estimated via the continuity method) is 0.6 cm². There is no evidence of aortic regurgitation.   2. Mild mitral regurgitation.   3. The right ventricle is normal in size. Right ventricular systolic function is normal.   4. There is mild concentric left ventricular hypertrophy. Left ventricular systolic function is rqgyii-xl-rhwswvzgpz reduced with a calculated ejection fraction of 40-45% with regional wall motion abnormalities. There is severe hypokinesis of mid inferolateral, mid anterolateral and basal inferior wall.   5. No pericardial effusion.    < end of copied text >      Cardiac Cath:

## 2020-08-24 NOTE — PROGRESS NOTE ADULT - ASSESSMENT
Assessment:  55y Male with a PMHx of CAD (s/p PCI to prox LA in 2018), HLD, current smoker (40 PY smoking hx), and AS who presented to [facility] ED on 8/22 with left sided chest pain, tightness, diaphoresis. He was found to have ST elevation in inferior leads and a troponin of 0.88. He was admitted under cardiology for a STEMI and was emergently brought to cath lab where he was found to have 85% occlusion to his proximal RCA, 75% occlusion to his mid RCA, 50% occlusion in the left main artery, 75% occlusion to LAD proximal, and OM1 100% thrombosed. A STEPHENIE was placed to OM1. An echo was obtained showing severe AS. Given patient's multi vessel CAD and AS CT surgery was consulted for surgical evaluation.    Plan:  Problem 1: Multi vessel CAD  - Case discussed with Dr. Mitchell, plan for possible AVR/CABG this admission.   - Please repeat TTE tomorrow.   - Please complete pre-operative workup including CBC, CMP, Coags, Lipid Panel, TSH, Hemoglobin A1c, Pro-BNP, Cardiac Enzymes, Type & Screen x 2,  Room air ABG, UA, Carotid US, TTE, CXR Pa/Lat, EKG, Bedside PFTS  - Pt is s/p STEMI with STEPHENIE to OM1, continue ASA, brillinta, atorvastatin per primary team. Will need to discuss discontinuing brillinta prior to OHS     Problem 2: Severe AS  - severe AS on echo, please repeat TTE tomorrow   - Was undergoing workup at [facility] for possible surgical intervention, please obtain outpatient CT scan results   - Continue diuresis per primary team    Problem 3: Pulmonary edema  - Per primary team note was requiring NC, now on RA  - Continue diuresis per primary team    Problem 4: Smoking cessation  - Pt has significant smoking hx  - Counseling on cessation per primary team     I have reviewed clinical labs tests and reports, radiology tests and reports, as well as old patient medical records, and discussed with the referring physician.

## 2020-08-24 NOTE — PROGRESS NOTE ADULT - SUBJECTIVE AND OBJECTIVE BOX
OVERNIGHT EVENTS: Patient had one episode of isolated hypotension while sleeping, but he was asymptomatic and self-resolved.     SUBJECTIVE / INTERVAL HPI: Patient seen and examined at bedside. He reports resolution of his chest pain but he is complaining of significant shortness of breath and inability to finish sentences without having to pause for taking a breath. Other than that, he denied chest pain, fevers, cough, urinary symptoms, headache.     VITAL SIGNS:  Vital Signs Last 24 Hrs  T(C): 36.4 (24 Aug 2020 08:46), Max: 39 (23 Aug 2020 18:00)  T(F): 97.6 (24 Aug 2020 08:46), Max: 102.2 (23 Aug 2020 18:00)  HR: 99 (24 Aug 2020 10:00) (95 - 129)  BP: 101/65 (24 Aug 2020 10:00) (81/51 - 109/60)  BP(mean): 78 (24 Aug 2020 10:00) (62 - 81)  RR: 31 (24 Aug 2020 10:00) (22 - 42)  SpO2: 99% (24 Aug 2020 10:00) (92% - 99%)    PHYSICAL EXAM:    General: NAD  Neck: supple, no JVD  Cardiovascular: +S1/S2; RRR  Respiratory: bilateral basilar wheezes and crackles, labored work of breathing, however no accessory muscle usage.   Gastrointestinal: soft, NT/ND; +BSx4  Extremities: WWP; no edema, clubbing or cyanosis  Vascular: 2+ radial, DP/PT pulses B/L  Neurological: AAOx3; no focal deficits    MEDICATIONS:  MEDICATIONS  (STANDING):  albuterol/ipratropium for Nebulization 3 milliLiter(s) Nebulizer every 6 hours  aspirin  chewable 81 milliGRAM(s) Oral daily  atorvastatin 80 milliGRAM(s) Oral at bedtime  azithromycin  IVPB 500 milliGRAM(s) IV Intermittent every 24 hours  cefTRIAXone   IVPB 1000 milliGRAM(s) IV Intermittent every 24 hours  chlorhexidine 4% Liquid 1 Application(s) Topical <User Schedule>  enoxaparin Injectable 40 milliGRAM(s) SubCutaneous every 24 hours  melatonin 5 milliGRAM(s) Oral at bedtime  nicotine - 21 mG/24Hr(s) Patch 1 patch Transdermal daily  ticagrelor 90 milliGRAM(s) Oral every 12 hours    MEDICATIONS  (PRN):  acetaminophen   Tablet .. 650 milliGRAM(s) Oral every 6 hours PRN Temp greater or equal to 38C (100.4F), Moderate Pain (4 - 6)  benzonatate 100 milliGRAM(s) Oral three times a day PRN Coughing      ALLERGIES:  Allergies    No Known Allergies    Intolerances        LABS:                        13.0   9.63  )-----------( 184      ( 24 Aug 2020 05:18 )             39.1     08-24    137  |  100  |  20  ----------------------------<  118<H>  4.0   |  25  |  0.84    Ca    9.4      24 Aug 2020 05:18  Phos  3.1     08-24  Mg     2.0     08-24    TPro  6.9  /  Alb  3.2<L>  /  TBili  0.9  /  DBili  x   /  AST  74<H>  /  ALT  51<H>  /  AlkPhos  68  08-24    PTT - ( 22 Aug 2020 12:08 )  PTT:33.7 sec  Urinalysis Basic - ( 23 Aug 2020 11:22 )    Color: Yellow / Appearance: Clear / S.015 / pH: x  Gluc: x / Ketone: NEGATIVE  / Bili: Negative / Urobili: 0.2 E.U./dL   Blood: x / Protein: NEGATIVE mg/dL / Nitrite: NEGATIVE   Leuk Esterase: NEGATIVE / RBC: < 5 /HPF / WBC < 5 /HPF   Sq Epi: x / Non Sq Epi: 0-5 /HPF / Bacteria: Present /HPF      CAPILLARY BLOOD GLUCOSE          RADIOLOGY & ADDITIONAL TESTS: Reviewed.    ASSESSMENT:    PLAN:

## 2020-08-25 ENCOUNTER — TRANSCRIPTION ENCOUNTER (OUTPATIENT)
Age: 55
End: 2020-08-25

## 2020-08-25 PROBLEM — I10 ESSENTIAL (PRIMARY) HYPERTENSION: Chronic | Status: ACTIVE | Noted: 2020-08-22

## 2020-08-25 PROBLEM — Z95.5 PRESENCE OF CORONARY ANGIOPLASTY IMPLANT AND GRAFT: Chronic | Status: ACTIVE | Noted: 2020-08-22

## 2020-08-25 PROBLEM — E78.00 PURE HYPERCHOLESTEROLEMIA, UNSPECIFIED: Chronic | Status: ACTIVE | Noted: 2020-08-22

## 2020-08-25 LAB
ALBUMIN SERPL ELPH-MCNC: 3.2 G/DL — LOW (ref 3.3–5)
ALBUMIN SERPL ELPH-MCNC: 4.2 G/DL — SIGNIFICANT CHANGE UP (ref 3.3–5)
ALP SERPL-CCNC: 77 U/L — SIGNIFICANT CHANGE UP (ref 40–120)
ALP SERPL-CCNC: 94 U/L — SIGNIFICANT CHANGE UP (ref 40–120)
ALT FLD-CCNC: 108 U/L — HIGH (ref 10–45)
ALT FLD-CCNC: 88 U/L — HIGH (ref 10–45)
ANION GAP SERPL CALC-SCNC: 13 MMOL/L — SIGNIFICANT CHANGE UP (ref 5–17)
ANION GAP SERPL CALC-SCNC: 18 MMOL/L — HIGH (ref 5–17)
APTT BLD: 31.1 SEC — SIGNIFICANT CHANGE UP (ref 27.5–35.5)
APTT BLD: 33.5 SEC — SIGNIFICANT CHANGE UP (ref 27.5–35.5)
AST SERPL-CCNC: 79 U/L — HIGH (ref 10–40)
AST SERPL-CCNC: 83 U/L — HIGH (ref 10–40)
BASOPHILS # BLD AUTO: 0.03 K/UL — SIGNIFICANT CHANGE UP (ref 0–0.2)
BASOPHILS # BLD AUTO: 0.07 K/UL — SIGNIFICANT CHANGE UP (ref 0–0.2)
BASOPHILS NFR BLD AUTO: 0.4 % — SIGNIFICANT CHANGE UP (ref 0–2)
BASOPHILS NFR BLD AUTO: 0.6 % — SIGNIFICANT CHANGE UP (ref 0–2)
BILIRUB SERPL-MCNC: 0.5 MG/DL — SIGNIFICANT CHANGE UP (ref 0.2–1.2)
BILIRUB SERPL-MCNC: 0.5 MG/DL — SIGNIFICANT CHANGE UP (ref 0.2–1.2)
BLD GP AB SCN SERPL QL: NEGATIVE — SIGNIFICANT CHANGE UP
BUN SERPL-MCNC: 20 MG/DL — SIGNIFICANT CHANGE UP (ref 7–23)
BUN SERPL-MCNC: 23 MG/DL — SIGNIFICANT CHANGE UP (ref 7–23)
CALCIUM SERPL-MCNC: 10 MG/DL — SIGNIFICANT CHANGE UP (ref 8.4–10.5)
CALCIUM SERPL-MCNC: 9.4 MG/DL — SIGNIFICANT CHANGE UP (ref 8.4–10.5)
CHLORIDE SERPL-SCNC: 101 MMOL/L — SIGNIFICANT CHANGE UP (ref 96–108)
CHLORIDE SERPL-SCNC: 104 MMOL/L — SIGNIFICANT CHANGE UP (ref 96–108)
CK MB CFR SERPL CALC: 3.1 NG/ML — SIGNIFICANT CHANGE UP (ref 0–6.7)
CK MB CFR SERPL CALC: 3.2 NG/ML — SIGNIFICANT CHANGE UP (ref 0–6.7)
CK MB CFR SERPL CALC: 3.3 NG/ML — SIGNIFICANT CHANGE UP (ref 0–6.7)
CK MB CFR SERPL CALC: 3.4 NG/ML — SIGNIFICANT CHANGE UP (ref 0–6.7)
CK SERPL-CCNC: 124 U/L — SIGNIFICANT CHANGE UP (ref 30–200)
CK SERPL-CCNC: 153 U/L — SIGNIFICANT CHANGE UP (ref 30–200)
CK SERPL-CCNC: 158 U/L — SIGNIFICANT CHANGE UP (ref 30–200)
CO2 SERPL-SCNC: 22 MMOL/L — SIGNIFICANT CHANGE UP (ref 22–31)
CO2 SERPL-SCNC: 23 MMOL/L — SIGNIFICANT CHANGE UP (ref 22–31)
CREAT SERPL-MCNC: 0.85 MG/DL — SIGNIFICANT CHANGE UP (ref 0.5–1.3)
CREAT SERPL-MCNC: 0.93 MG/DL — SIGNIFICANT CHANGE UP (ref 0.5–1.3)
EOSINOPHIL # BLD AUTO: 0.41 K/UL — SIGNIFICANT CHANGE UP (ref 0–0.5)
EOSINOPHIL # BLD AUTO: 0.53 K/UL — HIGH (ref 0–0.5)
EOSINOPHIL NFR BLD AUTO: 4.2 % — SIGNIFICANT CHANGE UP (ref 0–6)
EOSINOPHIL NFR BLD AUTO: 5 % — SIGNIFICANT CHANGE UP (ref 0–6)
GAS PNL BLDV: SIGNIFICANT CHANGE UP
GLUCOSE BLDC GLUCOMTR-MCNC: 194 MG/DL — HIGH (ref 70–99)
GLUCOSE SERPL-MCNC: 119 MG/DL — HIGH (ref 70–99)
GLUCOSE SERPL-MCNC: 131 MG/DL — HIGH (ref 70–99)
HCT VFR BLD CALC: 39.4 % — SIGNIFICANT CHANGE UP (ref 39–50)
HCT VFR BLD CALC: 45 % — SIGNIFICANT CHANGE UP (ref 39–50)
HGB BLD-MCNC: 12.9 G/DL — LOW (ref 13–17)
HGB BLD-MCNC: 14.5 G/DL — SIGNIFICANT CHANGE UP (ref 13–17)
IMM GRANULOCYTES NFR BLD AUTO: 0.4 % — SIGNIFICANT CHANGE UP (ref 0–1.5)
IMM GRANULOCYTES NFR BLD AUTO: 0.6 % — SIGNIFICANT CHANGE UP (ref 0–1.5)
INR BLD: 1.02 — SIGNIFICANT CHANGE UP (ref 0.88–1.16)
INR BLD: 1.06 — SIGNIFICANT CHANGE UP (ref 0.88–1.16)
LACTATE SERPL-SCNC: 1.5 MMOL/L — SIGNIFICANT CHANGE UP (ref 0.5–2)
LACTATE SERPL-SCNC: 5.9 MMOL/L — CRITICAL HIGH (ref 0.5–2)
LYMPHOCYTES # BLD AUTO: 1.44 K/UL — SIGNIFICANT CHANGE UP (ref 1–3.3)
LYMPHOCYTES # BLD AUTO: 17.5 % — SIGNIFICANT CHANGE UP (ref 13–44)
LYMPHOCYTES # BLD AUTO: 38.1 % — SIGNIFICANT CHANGE UP (ref 13–44)
LYMPHOCYTES # BLD AUTO: 4.81 K/UL — HIGH (ref 1–3.3)
MAGNESIUM SERPL-MCNC: 2.1 MG/DL — SIGNIFICANT CHANGE UP (ref 1.6–2.6)
MAGNESIUM SERPL-MCNC: 2.3 MG/DL — SIGNIFICANT CHANGE UP (ref 1.6–2.6)
MCHC RBC-ENTMCNC: 29.4 PG — SIGNIFICANT CHANGE UP (ref 27–34)
MCHC RBC-ENTMCNC: 29.8 PG — SIGNIFICANT CHANGE UP (ref 27–34)
MCHC RBC-ENTMCNC: 32.2 GM/DL — SIGNIFICANT CHANGE UP (ref 32–36)
MCHC RBC-ENTMCNC: 32.7 GM/DL — SIGNIFICANT CHANGE UP (ref 32–36)
MCV RBC AUTO: 91 FL — SIGNIFICANT CHANGE UP (ref 80–100)
MCV RBC AUTO: 91.3 FL — SIGNIFICANT CHANGE UP (ref 80–100)
MONOCYTES # BLD AUTO: 0.62 K/UL — SIGNIFICANT CHANGE UP (ref 0–0.9)
MONOCYTES # BLD AUTO: 0.75 K/UL — SIGNIFICANT CHANGE UP (ref 0–0.9)
MONOCYTES NFR BLD AUTO: 5.9 % — SIGNIFICANT CHANGE UP (ref 2–14)
MONOCYTES NFR BLD AUTO: 7.6 % — SIGNIFICANT CHANGE UP (ref 2–14)
NEUTROPHILS # BLD AUTO: 5.68 K/UL — SIGNIFICANT CHANGE UP (ref 1.8–7.4)
NEUTROPHILS # BLD AUTO: 6.39 K/UL — SIGNIFICANT CHANGE UP (ref 1.8–7.4)
NEUTROPHILS NFR BLD AUTO: 50.6 % — SIGNIFICANT CHANGE UP (ref 43–77)
NEUTROPHILS NFR BLD AUTO: 69.1 % — SIGNIFICANT CHANGE UP (ref 43–77)
NRBC # BLD: 0 /100 WBCS — SIGNIFICANT CHANGE UP (ref 0–0)
NRBC # BLD: 0 /100 WBCS — SIGNIFICANT CHANGE UP (ref 0–0)
PHOSPHATE SERPL-MCNC: 3.5 MG/DL — SIGNIFICANT CHANGE UP (ref 2.5–4.5)
PHOSPHATE SERPL-MCNC: 4.4 MG/DL — SIGNIFICANT CHANGE UP (ref 2.5–4.5)
PLATELET # BLD AUTO: 204 K/UL — SIGNIFICANT CHANGE UP (ref 150–400)
PLATELET # BLD AUTO: 269 K/UL — SIGNIFICANT CHANGE UP (ref 150–400)
POTASSIUM SERPL-MCNC: 4.1 MMOL/L — SIGNIFICANT CHANGE UP (ref 3.5–5.3)
POTASSIUM SERPL-MCNC: 4.7 MMOL/L — SIGNIFICANT CHANGE UP (ref 3.5–5.3)
POTASSIUM SERPL-SCNC: 4.1 MMOL/L — SIGNIFICANT CHANGE UP (ref 3.5–5.3)
POTASSIUM SERPL-SCNC: 4.7 MMOL/L — SIGNIFICANT CHANGE UP (ref 3.5–5.3)
PROT SERPL-MCNC: 7 G/DL — SIGNIFICANT CHANGE UP (ref 6–8.3)
PROT SERPL-MCNC: 8.6 G/DL — HIGH (ref 6–8.3)
PROTHROM AB SERPL-ACNC: 12.2 SEC — SIGNIFICANT CHANGE UP (ref 10.6–13.6)
PROTHROM AB SERPL-ACNC: 12.7 SEC — SIGNIFICANT CHANGE UP (ref 10.6–13.6)
RBC # BLD: 4.33 M/UL — SIGNIFICANT CHANGE UP (ref 4.2–5.8)
RBC # BLD: 4.93 M/UL — SIGNIFICANT CHANGE UP (ref 4.2–5.8)
RBC # FLD: 13 % — SIGNIFICANT CHANGE UP (ref 10.3–14.5)
RBC # FLD: 13 % — SIGNIFICANT CHANGE UP (ref 10.3–14.5)
RH IG SCN BLD-IMP: NEGATIVE — SIGNIFICANT CHANGE UP
SODIUM SERPL-SCNC: 140 MMOL/L — SIGNIFICANT CHANGE UP (ref 135–145)
SODIUM SERPL-SCNC: 141 MMOL/L — SIGNIFICANT CHANGE UP (ref 135–145)
TROPONIN T SERPL-MCNC: 2.74 NG/ML — CRITICAL HIGH (ref 0–0.01)
TROPONIN T SERPL-MCNC: 3.54 NG/ML — CRITICAL HIGH (ref 0–0.01)
TROPONIN T SERPL-MCNC: 3.63 NG/ML — CRITICAL HIGH (ref 0–0.01)
TROPONIN T SERPL-MCNC: 3.92 NG/ML — CRITICAL HIGH (ref 0–0.01)
WBC # BLD: 12.62 K/UL — HIGH (ref 3.8–10.5)
WBC # BLD: 8.21 K/UL — SIGNIFICANT CHANGE UP (ref 3.8–10.5)
WBC # FLD AUTO: 12.62 K/UL — HIGH (ref 3.8–10.5)
WBC # FLD AUTO: 8.21 K/UL — SIGNIFICANT CHANGE UP (ref 3.8–10.5)

## 2020-08-25 PROCEDURE — 99239 HOSP IP/OBS DSCHRG MGMT >30: CPT

## 2020-08-25 PROCEDURE — 71045 X-RAY EXAM CHEST 1 VIEW: CPT | Mod: 26,77

## 2020-08-25 PROCEDURE — 70450 CT HEAD/BRAIN W/O DYE: CPT | Mod: 26

## 2020-08-25 PROCEDURE — 74174 CTA ABD&PLVS W/CONTRAST: CPT | Mod: 26

## 2020-08-25 PROCEDURE — 71045 X-RAY EXAM CHEST 1 VIEW: CPT | Mod: 26

## 2020-08-25 RX ORDER — FUROSEMIDE 40 MG
10 TABLET ORAL ONCE
Refills: 0 | Status: COMPLETED | OUTPATIENT
Start: 2020-08-25 | End: 2020-08-25

## 2020-08-25 RX ORDER — METOPROLOL TARTRATE 50 MG
25 TABLET ORAL EVERY 24 HOURS
Refills: 0 | Status: DISCONTINUED | OUTPATIENT
Start: 2020-08-25 | End: 2020-08-31

## 2020-08-25 RX ORDER — SODIUM CHLORIDE 9 MG/ML
250 INJECTION INTRAMUSCULAR; INTRAVENOUS; SUBCUTANEOUS ONCE
Refills: 0 | Status: COMPLETED | OUTPATIENT
Start: 2020-08-25 | End: 2020-08-25

## 2020-08-25 RX ORDER — VASOPRESSIN 20 [USP'U]/ML
0.04 INJECTION INTRAVENOUS
Qty: 50 | Refills: 0 | Status: DISCONTINUED | OUTPATIENT
Start: 2020-08-25 | End: 2020-08-26

## 2020-08-25 RX ADMIN — TICAGRELOR 90 MILLIGRAM(S): 90 TABLET ORAL at 05:27

## 2020-08-25 RX ADMIN — AZITHROMYCIN 255 MILLIGRAM(S): 500 TABLET, FILM COATED ORAL at 15:02

## 2020-08-25 RX ADMIN — Medication 3 MILLILITER(S): at 17:02

## 2020-08-25 RX ADMIN — ENOXAPARIN SODIUM 40 MILLIGRAM(S): 100 INJECTION SUBCUTANEOUS at 21:10

## 2020-08-25 RX ADMIN — CHLORHEXIDINE GLUCONATE 1 APPLICATION(S): 213 SOLUTION TOPICAL at 05:27

## 2020-08-25 RX ADMIN — Medication 5 MILLIGRAM(S): at 21:10

## 2020-08-25 RX ADMIN — ATORVASTATIN CALCIUM 80 MILLIGRAM(S): 80 TABLET, FILM COATED ORAL at 21:10

## 2020-08-25 RX ADMIN — TICAGRELOR 90 MILLIGRAM(S): 90 TABLET ORAL at 18:28

## 2020-08-25 RX ADMIN — Medication 3 MILLILITER(S): at 23:27

## 2020-08-25 RX ADMIN — Medication 3 MILLILITER(S): at 10:27

## 2020-08-25 RX ADMIN — Medication 10 MILLIGRAM(S): at 11:38

## 2020-08-25 RX ADMIN — SODIUM CHLORIDE 1500 MILLILITER(S): 9 INJECTION INTRAMUSCULAR; INTRAVENOUS; SUBCUTANEOUS at 12:49

## 2020-08-25 RX ADMIN — Medication 81 MILLIGRAM(S): at 11:38

## 2020-08-25 RX ADMIN — Medication 3 MILLILITER(S): at 04:33

## 2020-08-25 RX ADMIN — CEFTRIAXONE 100 MILLIGRAM(S): 500 INJECTION, POWDER, FOR SOLUTION INTRAMUSCULAR; INTRAVENOUS at 09:38

## 2020-08-25 RX ADMIN — Medication 25 MILLIGRAM(S): at 11:38

## 2020-08-25 NOTE — DISCHARGE NOTE PROVIDER - NSDCFUADDINST_GEN_ALL_CORE_FT
- NEVER MISS A DOSE OF ASPIRIN OR PLAVIX. IF YOU DO, YOU ARE AT RISK OF YOUR STENTS CLOSING AND HAVING A HEART ATTACK. DO NOT STOP THESE TWO MEDICATIONS UNLESS INSTRUCTED TO DO SO BY YOUR CARDIOLOGIST.   - Do NOT drive or operate hazardous machinery for 24 hours. Limit your physical activity for 24-48 hours. Do NOT engage in sports, heavy work or heavy lifting for 72 hours.   - You MAY shower BUT no TUB BATHS, HOT TUBS OR SWIMMING FOR 5 DAYS  - Your procedure was done through your right wrist. If you observe flank bleeding from the puncture site, it is an emergency. Please put direct pressure on the site and go directly to the ER. Bleeding under the skin may also occur and a small "black and blue" may be expected. If the area appears to be expanding or swelling around the puncture site, apply manual compression and go immediately to the nearest ER. If your arm/hand becomes cool or blue and/or you are unable to move it, this must be treated as an emergency, go directly to the nearest ER. Look for signs of infection in the wrist: fever, red streaking of the arm, obvious pus formation and pain.

## 2020-08-25 NOTE — PROVIDER CONTACT NOTE (CHANGE IN STATUS NOTIFICATION) - ACTION/TREATMENT ORDERED:
Labs, ECG, Bedside echo, CXR, Pacer pads placed. NS Bolus administered. Vasopressin readied at bedside. Emergency meds ready at bedside

## 2020-08-25 NOTE — DISCHARGE NOTE PROVIDER - NSDCCPCAREPLAN_GEN_ALL_CORE_FT
PRINCIPAL DISCHARGE DIAGNOSIS  Diagnosis: ST elevation myocardial infarction (STEMI), unspecified artery  Assessment and Plan of Treatment: - The heart requires a supply of blood. The blood supply to the heart is provided by blood vessels called the coronary arteries. Myocardial infarction, or MI (commonly known as a "heart attack"), is damage or death of part of the heart muscle. The damage is caused by lack of blood flow through the coronary arteries. You presented to the hospital with chest pain and tests were performed showing that you HAD A HEART ATTACK. An angiogram was performed, showing that you have coronary artery disease in which the arteries become narrowed by fatty deposits called plaque, which limit the blood flow to the heart, causing the chest pain. A stent was placed to OPEN THE ARTERY and increase the blood flow to the heart.         SECONDARY DISCHARGE DIAGNOSES  Diagnosis: Aortic stenosis  Assessment and Plan of Treatment: When you presented to the hospital an echocardiogram or ultrasound of your heart was performed, which showed that you have aortic stenosis. Aortic stenosis is a condition in which 1 of the valves in the heart, called the aortic valve, doesn't open fully. The heart valves keep blood flowing in only 1 direction. When the heart valves work normally, they open all the way to let blood flow through them. In Aortic stenosis, the aortic valve does NOT OPEN FULLY. When this happens, not as much blood can flow out of the heart to the rest of the body, the heart works harder than usual to pump blood to the rest of the body. PRINCIPAL DISCHARGE DIAGNOSIS  Diagnosis: ST elevation myocardial infarction (STEMI), unspecified artery  Assessment and Plan of Treatment: - The heart requires a supply of blood. The blood supply to the heart is provided by blood vessels called the coronary arteries. Myocardial infarction, or MI (commonly known as a "heart attack"), is damage or death of part of the heart muscle. The damage is caused by lack of blood flow through the coronary arteries. You presented to the hospital with chest pain and tests were performed showing that you HAD A HEART ATTACK. An angiogram was performed, showing that you have coronary artery disease in which the arteries become narrowed by fatty deposits called plaque, which limit the blood flow to the heart, causing the chest pain. A stent was placed to OPEN THE ARTERY and increase the blood flow to the heart.   - You still have blockages in the coronary arteries for which you will undergo coronary bypass artery surgery to increase the blood flow to the heart. You are to follow up with Dr. Mitchell and Dr. Da Silva as scheduled and will return the following week for surgery.         SECONDARY DISCHARGE DIAGNOSES  Diagnosis: Aortic stenosis  Assessment and Plan of Treatment: When you presented to the hospital an echocardiogram or ultrasound of your heart was performed, which showed that you have aortic stenosis. Aortic stenosis is a condition in which 1 of the valves in the heart, called the aortic valve, doesn't open fully. The heart valves keep blood flowing in only 1 direction. When the heart valves work normally, they open all the way to let blood flow through them. In Aortic stenosis, the aortic valve does NOT OPEN FULLY. When this happens, not as much blood can flow out of the heart to the rest of the body, the heart works harder than usual to pump blood to the rest of the body.    This causes symptoms such as shortness of breath, dizziness or fainting and chest pain. You are to follow up with Dr. Mitchell and Dr. Da Silva as scheduled and will return the following week for surgery to fix the valve.       Diagnosis: Chronic systolic congestive heart failure  Assessment and Plan of Treatment: An echocardiogram or ultrasound of your heart was performed which showed an ejection fraction or pumping function of your heart to be 40%, which is  LOW with systolic dysfunction. In other words, you were diagnosed with systolic heart failure. This is a type of heart failure. Heart failure is a condition in which the heart does not pump or fill with blood well. As a result, the heart lags behind in its job of moving blood throughout the body. This can lead to symptoms such as swelling, trouble breathing, and feeling tired. Your heart is VERY WEAK. When it pumps it does not squeeze properly. PRINCIPAL DISCHARGE DIAGNOSIS  Diagnosis: ST elevation myocardial infarction (STEMI), unspecified artery  Assessment and Plan of Treatment: - The heart requires a supply of blood. The blood supply to the heart is provided by blood vessels called the coronary arteries. Myocardial infarction, or MI (commonly known as a "heart attack"), is damage or death of part of the heart muscle. The damage is caused by lack of blood flow through the coronary arteries. You presented to the hospital with chest pain and tests were performed showing that you HAD A HEART ATTACK. An angiogram was performed, showing that you have coronary artery disease in which the arteries become narrowed by fatty deposits called plaque, which limit the blood flow to the heart, causing the chest pain. A stent was placed to OPEN THE ARTERY and increase the blood flow to the heart.   - You still have blockages in the coronary arteries for which you will undergo coronary bypass artery surgery to increase the blood flow to the heart. You are to follow up with Dr. Mitchell and Dr. Da Silva as scheduled and will return for the surgery.      SECONDARY DISCHARGE DIAGNOSES  Diagnosis: Aortic stenosis  Assessment and Plan of Treatment: - When you presented to the hospital an echocardiogram or ultrasound of your heart was performed, which showed that you have aortic stenosis. Aortic stenosis is a condition in which 1 of the valves in the heart, called the aortic valve, doesn't open fully. The heart valves keep blood flowing in only 1 direction. When the heart valves work normally, they open all the way to let blood flow through them. In Aortic stenosis, the aortic valve does NOT OPEN FULLY. When this happens, not as much blood can flow out of the heart to the rest of the body, the heart works harder than usual to pump blood to the rest of the body.    - This causes symptoms such as shortness of breath, dizziness or fainting and chest pain. You are to follow up with Dr. Mitchell and Dr. Da Silva as scheduled and will return for surgery to fix the valve.       Diagnosis: Chronic systolic congestive heart failure  Assessment and Plan of Treatment: - An echocardiogram or ultrasound of your heart was performed which showed an ejection fraction or pumping function of your heart to be 40%, which is  LOW with systolic dysfunction. In other words, you were diagnosed with systolic heart failure. This is a type of heart failure. Heart failure is a condition in which the heart does not pump or fill with blood well. As a result, the heart lags behind in its job of moving blood throughout the body. This can lead to symptoms such as swelling, trouble breathing, and feeling tired. Your heart is VERY WEAK. When it pumps it does not squeeze properly.    -  You were given intravenous Lasix to get rid of the fluid in your lungs and to help you breathe better. Please take Lasix as prescribed without missing doses. Please continue taking Metoprolol as prescribed. Please maintain a low salt diet (less than 2grams per day). Weigh yourself daily and report any weight gain over 2 pounds/day to your Doctor. PRINCIPAL DISCHARGE DIAGNOSIS  Diagnosis: ST elevation myocardial infarction (STEMI), unspecified artery  Assessment and Plan of Treatment: - The heart requires a supply of blood. The blood supply to the heart is provided by blood vessels called the coronary arteries. Myocardial infarction, or MI (commonly known as a "heart attack"), is damage or death of part of the heart muscle. The damage is caused by lack of blood flow through the coronary arteries. You presented to the hospital with chest pain and tests were performed showing that you HAD A HEART ATTACK. An angiogram was performed, showing that you have coronary artery disease in which the arteries become narrowed by fatty deposits called plaque, which limit the blood flow to the heart, causing the chest pain. A stent was placed to OPEN THE ARTERY and increase the blood flow to the heart.   - You still have blockages in the coronary arteries for which you will undergo coronary bypass artery surgery to increase the blood flow to the heart. You are to follow up with Dr. Mitchell and Dr. Landers as scheduled and will return for the surgery.      SECONDARY DISCHARGE DIAGNOSES  Diagnosis: Aortic stenosis  Assessment and Plan of Treatment: - When you presented to the hospital an echocardiogram or ultrasound of your heart was performed, which showed that you have aortic stenosis. Aortic stenosis is a condition in which 1 of the valves in the heart, called the aortic valve, doesn't open fully. The heart valves keep blood flowing in only 1 direction. When the heart valves work normally, they open all the way to let blood flow through them. In Aortic stenosis, the aortic valve does NOT OPEN FULLY. When this happens, not as much blood can flow out of the heart to the rest of the body, the heart works harder than usual to pump blood to the rest of the body.    - This causes symptoms such as shortness of breath, dizziness or fainting and chest pain. You are to follow up with Dr. Mitchell and Dr. Landers as scheduled and will return for surgery to fix the valve.       Diagnosis: Chronic systolic congestive heart failure  Assessment and Plan of Treatment: - An echocardiogram or ultrasound of your heart was performed which showed an ejection fraction or pumping function of your heart to be 40%, which is  LOW with systolic dysfunction. In other words, you were diagnosed with systolic heart failure. This is a type of heart failure. Heart failure is a condition in which the heart does not pump or fill with blood well. As a result, the heart lags behind in its job of moving blood throughout the body. This can lead to symptoms such as swelling, trouble breathing, and feeling tired. Your heart is VERY WEAK. When it pumps it does not squeeze properly.    -  You were given intravenous Lasix to get rid of the fluid in your lungs and to help you breathe better. Please take Lasix as prescribed without missing doses. Please continue taking Metoprolol as prescribed. Please maintain a low salt diet (less than 2grams per day). Weigh yourself daily and report any weight gain over 2 pounds/day to your Doctor. PRINCIPAL DISCHARGE DIAGNOSIS  Diagnosis: ST elevation myocardial infarction (STEMI), unspecified artery  Assessment and Plan of Treatment: - The heart requires a supply of blood. The blood supply to the heart is provided by blood vessels called the coronary arteries. Myocardial infarction, or MI (commonly known as a "heart attack"), is damage or death of part of the heart muscle. The damage is caused by lack of blood flow through the coronary arteries. You presented to the hospital with chest pain and tests were performed showing that you HAD A HEART ATTACK. An angiogram was performed, showing that you have coronary artery disease in which the arteries become narrowed by fatty deposits called plaque, which limit the blood flow to the heart, causing the chest pain. A stent was placed to OPEN THE ARTERY and increase the blood flow to the heart.   - You still have blockages in the coronary arteries for which you will undergo coronary bypass artery surgery to increase the blood flow to the heart. You are to follow up with Dr. Mitchell and Dr. Landers as scheduled on 9/8/20 at 2PM and will return for surgery.      SECONDARY DISCHARGE DIAGNOSES  Diagnosis: Aortic stenosis  Assessment and Plan of Treatment: - When you presented to the hospital an echocardiogram or ultrasound of your heart was performed, which showed that you have aortic stenosis. Aortic stenosis is a condition in which 1 of the valves in the heart, called the aortic valve, doesn't open fully. The heart valves keep blood flowing in only 1 direction. When the heart valves work normally, they open all the way to let blood flow through them. In Aortic stenosis, the aortic valve does NOT OPEN FULLY. When this happens, not as much blood can flow out of the heart to the rest of the body, the heart works harder than usual to pump blood to the rest of the body.    - This causes symptoms such as shortness of breath, dizziness or fainting and chest pain. You are to follow up with Dr. Mitchell and Dr. Landers as scheduled on 9/8/20 at 2PM and will return for surgery to fix the valve.       Diagnosis: Chronic systolic congestive heart failure  Assessment and Plan of Treatment: - An echocardiogram or ultrasound of your heart was performed which showed an ejection fraction or pumping function of your heart to be 40%, which is  LOW with systolic dysfunction. In other words, you were diagnosed with systolic heart failure. This is a type of heart failure. Heart failure is a condition in which the heart does not pump or fill with blood well. As a result, the heart lags behind in its job of moving blood throughout the body. This can lead to symptoms such as swelling, trouble breathing, and feeling tired. Your heart is VERY WEAK. When it pumps it does not squeeze properly.    -  You were given intravenous Lasix to get rid of the fluid in your lungs and to help you breathe better. Please take Lasix as prescribed without missing doses. Please continue taking Metoprolol as prescribed. Please maintain a low salt diet (less than 2grams per day). Weigh yourself daily and report any weight gain over 2 pounds/day to your Doctor. PRINCIPAL DISCHARGE DIAGNOSIS  Diagnosis: ST elevation myocardial infarction (STEMI), unspecified artery  Assessment and Plan of Treatment: - The heart requires a supply of blood. The blood supply to the heart is provided by blood vessels called the coronary arteries. Myocardial infarction, or MI (commonly known as a "heart attack"), is damage or death of part of the heart muscle. The damage is caused by lack of blood flow through the coronary arteries. You presented to the hospital with chest pain and tests were performed showing that you HAD A HEART ATTACK. An angiogram was performed, showing that you have coronary artery disease in which the arteries become narrowed by fatty deposits called plaque, which limit the blood flow to the heart, causing the chest pain. A stent was placed to OPEN THE ARTERY and increase the blood flow to the heart. NEVER MISS A DOSE OF ASPIRIN OR PLAVIX. IF YOU DO, YOU ARE AT RISK OF YOUR STENTS CLOSING AND HAVING A HEART ATTACK. DO NOT STOP THESE TWO MEDICATIONS UNLESS INSTRUCTED TO DO SO BY YOUR CARDIOLOGIST.   - You still have blockages in the coronary arteries for which you will undergo coronary bypass artery surgery to increase the blood flow to the heart. You are to follow up with Dr. Mitchell and Dr. Landers as scheduled on 9/8/20 at 2PM and will return for surgery.      SECONDARY DISCHARGE DIAGNOSES  Diagnosis: Aortic stenosis  Assessment and Plan of Treatment: - When you presented to the hospital an echocardiogram or ultrasound of your heart was performed, which showed that you have aortic stenosis. Aortic stenosis is a condition in which 1 of the valves in the heart, called the aortic valve, doesn't open fully. The heart valves keep blood flowing in only 1 direction. When the heart valves work normally, they open all the way to let blood flow through them. In Aortic stenosis, the aortic valve does NOT OPEN FULLY. When this happens, not as much blood can flow out of the heart to the rest of the body, the heart works harder than usual to pump blood to the rest of the body.    - This causes symptoms such as shortness of breath, dizziness or fainting and chest pain. You are to follow up with Dr. Mitchell and Dr. Landers as scheduled on 9/8/20 at 2PM and will return for surgery to fix the valve.       Diagnosis: Chronic systolic congestive heart failure  Assessment and Plan of Treatment: - An echocardiogram or ultrasound of your heart was performed which showed an ejection fraction or pumping function of your heart to be 40%, which is  LOW with systolic dysfunction. In other words, you were diagnosed with systolic heart failure. This is a type of heart failure. Heart failure is a condition in which the heart does not pump or fill with blood well. As a result, the heart lags behind in its job of moving blood throughout the body. This can lead to symptoms such as swelling, trouble breathing, and feeling tired. Your heart is VERY WEAK. When it pumps it does not squeeze properly.    -  You were given intravenous Lasix to get rid of the fluid in your lungs and to help you breathe better. Please take Lasix as prescribed without missing doses. Please continue taking Metoprolol as prescribed. Please maintain a low salt diet (less than 2grams per day). Weigh yourself daily and report any weight gain over 2 pounds/day to your Doctor.

## 2020-08-25 NOTE — DISCHARGE NOTE PROVIDER - INSTRUCTIONS
- Have at least 2 cups of vegetables a day, at least 2 whole grains a day, 2 pieces of fruit a day, limiting red meat and processed meat to no more than twice per week, increasing fish intake to at least twice a week, having nuts and seeds on most days. - Have at least 2 cups of vegetables a day, at least 2 whole grains a day, 2 pieces of fruit a day, limiting red meat and processed meat to no more than twice per week, increasing fish intake to at least twice a week, having nuts and seeds on most days.  - Limit salt intake on a daily basis. Salt is hidden in many foods including fast food, processed foods, deli foods and deli meats. Cook at home as much as you can and DO NOT use salt. Limit liquid intake to 1 liter a day (includes water, any drinks and soup).

## 2020-08-25 NOTE — DISCHARGE NOTE PROVIDER - HOSPITAL COURSE
Mr. Alexandrea Winter is a 55 year old male with past medical history significant for CAD (s/p proximal LAD stent in 2018), Aortic Stenosis, HLD, 40 pack year smoking history, history of aortic valve stenosis pending aortic valve replacement, presented Saturday 8/22 morning from his home with left sided chest pain, tightness, diaphoresis, and shortness of breath. He was found to have ST elevations in inferior leads and an elevated trop of 0.88, and was admitted ultimately for STEMI and sent emergently to cath lab. In the cath lab, he was found to have 85% occlusion to his proximal RCA, 75% occlusion to his mid RCA, 50% occlusion in the left main artery, and 75% occlusion to LAD proximal. His OM1 was found to be 100% thrombosed, was determined to be the culprit lesion, and STENT was placed. He was admitted after the procedure to the CCU for further monitoring. He was placed on Aspirin 81, Brillanta 90mg BID, Lipitor 80 mg, and two doses of Lasix 10 mg IV. He also was given Duo Nebs for his presumed COPD. He also was febrile during his first day of admission, with source presumed to be left lower lobe pneumonia, for which he was given ceftriaxone and azithromycin, both of which will end on 8/26. Throughout the course of his hospital stay he was evaluated by the Cardiothoracic team for aortic valve replacement surgery and CABG to address the remainder of his occluded vessels; however, ultimately it was decided to pursue this outpatient, as per CT team recommendations. During his stay in the hospital, pre-op labs and imaging were completed, in preparation for this surgery.         Problem List/Main Diagnoses (system-based):         Cardio    #Inferolateral STEMI     He was found to have ST elevations in inferior leads and an elevated trop of 0.88. In the cath lab, he was found to have 85% occlusion to his proximal RCA, 75% occlusion to his mid RCA, 50% occlusion in the left main artery, and 75% occlusion to LAD proximal. His OM1 was found to be 100% thrombosed, was determined to be the culprit lesion, and STENT was placed.     -C/w brillanta 90 mg BID    -C/w aspirin 81 mg daily    -c/w lipitor 80 mg at bedtime        #Severe aortic stenosis- pending aortic valve replacement     -Patient to go for Aortic Valve Replacement and CABG after discharge.     -Follow up CT Chest        #HLD    -Lipid panel showed cholesterol 276, Direct , HDL: 34    -Continue with lipitor 80 mg daily        #HFrEF:     -Left ventricular systolic function is lnfwnm-kw-ubrwvzidyh reduced with a calculated ejection fraction of 40-45% with regional wall motion abnormalities.     CXRs showing pulmonary edema, fluid congestion    -Lasix 10 mg IV inpatient, patient to be discharged on Lasix 20 mg PO    -Patient to be discharged on Metoprolol Succinate 25 mg PO             Pulm    #Pulmonary Edema    -Lasix 10 mg IV today        #COPD-40 pack year smoking history with chronic cough    -On Duonebs Q6h and benzonatate 100 mg PO TID PRN for coughing    -f/u daily CXR    -nicotine patch transdermal daily         Endocrine    -No active issues        Renal    -No active issues        GI    -No active issues        ID    #Community acquired pneumonia: no fevers since 8/23 AM    -CXR 8/23 showing some consolidation in L middle lobe    -ceftriaxone 1 g q24h x 5 days    -azithromycin 500 mg q24h x 3 days            Inpatient treatment course:         New medications:         Labs to be followed outpatient:         Exam to be followed outpatient:         Other: 55y/oM PMHx CAD (s/p proximal LAD stent in 2018), Aortic Stenosis, HLD, 40 pack year smoking history, history of aortic valve stenosis pending aortic valve replacement, presented 8/22 w/ CP. diaphoresis and SOB, found to have ST elevations in inferior leads and an elevated trop of 0.88. He was admitted for STEMI and sent to cath lab where he was found to have 85% occlusion to proximal RCA, 75% occlusion to mid RCA, 50% occlusion in the left main artery, and 75% occlusion to LAD proximal. His OM1 was found to be 100% thrombosed, was determined to be the culprit lesion, and STENT was placed. He was admitted after the procedure to the CCU for further monitoring. Of note, he was febrile on admission presumed to be LLL PNA for which he was treated w/Ceftriaxone and Azithromycin (end 8/26). Throughout the course of his hospital stay he was evaluated by the Cardiothoracic team for aortic valve replacement surgery and CABG to address the remainder of his occluded vessels; however, ultimately it was decided to pursue this outpatient, as per CT team recommendations. During his stay in the hospital, pre-op labs and imaging were completed, in preparation for this surgery. ECHO also performed s/f LVSG mildly-moderately reduced w.EF 40-45% w.regional wall motional abnormalities.                  He was placed on Aspirin 81, Brillanta 90mg BID, Lipitor 80 mg, and two doses of Lasix 10 mg IV. He also was given Duo Nebs for his presumed COPD. He also was febrile during his first day of admission, with source presumed to be left lower lobe pneumonia, for which he was given ceftriaxone and azithromycin, both of which will end on 8/26. Throughout the course of his hospital stay he was evaluated by the Cardiothoracic team for aortic valve replacement surgery and CABG to address the remainder of his occluded vessels; however, ultimately it was decided to pursue this outpatient, as per CT team recommendations. During his stay in the hospital, pre-op labs and imaging were completed, in preparation for this surgery.                                 #HFrEF:     -Left ventricular systolic function is wmzaid-zk-odwofzaykt reduced with a calculated ejection fraction of 40-45% with regional wall motion abnormalities.     CXRs showing pulmonary edema, fluid congestion    -Lasix 10 mg IV inpatient, patient to be discharged on Lasix 20 mg PO    -Patient to be discharged on Metoprolol Succinate 25 mg PO             Pulm    #Pulmonary Edema    -Lasix 10 mg IV today        #COPD-40 pack year smoking history with chronic cough    -On Duonebs Q6h and benzonatate 100 mg PO TID PRN for coughing    -f/u daily CXR    -nicotine patch transdermal daily 55y/oM PMHx CAD (s/p proximal LAD stent in 2018), Aortic Stenosis, HLD, 40 pack year smoking history, history of aortic valve stenosis pending aortic valve replacement, presented 8/22 w/ CP. diaphoresis and SOB, found to have ST elevations in inferior leads and an elevated trop of 0.88. He was admitted for STEMI and sent to cath lab where he was found to have 85% occlusion to proximal RCA, 75% occlusion to mid RCA, 50% occlusion in the left main artery, and 75% occlusion to LAD proximal. His OM1 was found to be 100% thrombosed, was determined to be the culprit lesion, and STENT was placed. He was admitted after the procedure to the CCU for further monitoring. Of note, he was febrile on admission presumed to be LLL PNA for which he was treated w/Ceftriaxone and Azithromycin (end 8/26). Throughout the course of his hospital stay he was evaluated by the Cardiothoracic team for aortic valve replacement surgery and CABG to address the remainder of his occluded vessels; however, ultimately it was decided to pursue this outpatient, as per CT team recommendations. During his stay in the hospital, pre-op labs and imaging were completed, in preparation for this surgery. CTH no acute intracranial hemorrhage, mass effect or demarcated territorial infarction. CT abdomen: moderate focal stenosis left common iliac artery and mild pulmonary edema. ECHO also performed s/f severe AS- aortic valve area 0.6cm; no evidence of AR; mild MR. mild concentric LVH. LVSF mildly-moderately reduced w.EF 40-45% w.regional wall motional abnormalities.                 When you presented to the hospital an echocardiogram or ultrasound of your heart was performed, which showed that     you have aortic stenosis. Aortic stenosis is a condition in which 1 of the valves in the heart, called the aortic valve, doesn't open fully. The heart valves keep blood flowing in only 1 direction. When the heart valves work normally, they open all the way to let blood flow through them. In Aortic stenosis, the aortic valve does NOT OPEN FULLY. When this happens, not as much blood can flow out of the heart to the rest of the body, the heart works harder than usual to pump blood to the rest of the body. 55y/oM PMHx CAD (s/p proximal LAD stent in 2018), Aortic Stenosis, HLD, 40 pack year smoking history, history of aortic valve stenosis pending aortic valve replacement, presented 8/22 w/ CP. diaphoresis and SOB, found to have ST elevations in inferior leads and an elevated trop of 0.88. He was admitted for STEMI and sent to cath lab where he was found to have 85% occlusion to proximal RCA, 75% occlusion to mid RCA, 50% occlusion in the left main artery, and 75% occlusion to LAD proximal. His OM1 was found to be 100% thrombosed, was determined to be the culprit lesion, and STENT was placed. He was admitted after the procedure to the CCU for further monitoring. Of note, he was febrile on admission presumed to be LLL PNA for which he was treated w/Ceftriaxone and Azithromycin (end 8/26). Throughout the course of his hospital stay he was evaluated by the Cardiothoracic team for aortic valve replacement surgery and CABG to address the remainder of his occluded vessels; however, ultimately it was decided to pursue this outpatient, as per CT team recommendations. During his stay in the hospital, pre-op labs and imaging were completed, in preparation for this surgery. CTH no acute intracranial hemorrhage, mass effect or demarcated territorial infarction. CT abdomen: moderate focal stenosis left common iliac artery and mild pulmonary edema. ECHO also performed s/f severe AS- aortic valve area 0.6cm; no evidence of AR; mild MR. mild concentric LVH. LVSF mildly-moderately reduced w.EF 40-45% w.regional wall motional abnormalities. 55y/oM PMHx CAD (s/p proximal LAD stent in 2018), Aortic Stenosis, HLD, 40 pack year smoking history, history of aortic valve stenosis pending aortic valve replacement, presented 8/22 w/ CP. diaphoresis and SOB, found to have ST elevations in inferior leads and an elevated trop of 0.88. He was admitted for STEMI and sent to cath lab where he was found to have 85% occlusion to proximal RCA, 75% occlusion to mid RCA, 50% occlusion in the left main artery, and 75% occlusion to LAD proximal. His OM1 was found to be 100% thrombosed, was determined to be the culprit lesion, and STENT was placed. He was admitted after the procedure to the CCU for further monitoring. Of note, he was febrile on admission presumed to be LLL PNA for which he was treated w/Ceftriaxone and Azithromycin (end 8/26). Throughout the course of his hospital stay he was evaluated by the Cardiothoracic team for aortic valve replacement surgery and CABG to address the remainder of his occluded vessels; however, ultimately it was decided to pursue this outpatient, as per CT team recommendations. During his stay in the hospital, pre-op labs and imaging were completed, in preparation for this surgery. CTH no acute intracranial hemorrhage, mass effect or demarcated territorial infarction. CT abdomen: moderate focal stenosis left common iliac artery and mild pulmonary edema. ECHO also performed s/f severe AS- aortic valve area 0.6cm; no evidence of AR; mild MR. mild concentric LVH. LVSF mildly-moderately reduced w.EF 40-45% w.regional wall motional abnormalities.         On the day of discharge, the patient was seen and examined. Symptoms improved. Vital signs are stable. Labs and imaging reviewed. Patient is medically optimized and hemodynamically stable. Return precautions discussed, medication teach back done w/ patient, and importance of physician followup emphasized for which he verbalized understanding.         DC Meds:    aspirin 81 mg oral tablet, chewable: 1 tab(s) orally once a day    clopidogrel 75 mg oral tablet: 1 tab(s) orally once a day    furosemide 20 mg oral tablet: 1 tab(s) orally every 24 hours    metoprolol succinate 25 mg oral tablet, extended release: 1 tab(s) orally every 24 hours    nicotine 21 mg/24 hr transdermal film, extended release: 1 patch transdermal once a day     pantoprazole 40 mg oral delayed release tablet: 1 tab(s) orally once a day (before a meal)    simvastatin 40 mg oral tablet: 1 tab(s) orally once a day (at bedtime) 55y/oM PMHx CAD (s/p proximal LAD stent in 2018), Aortic Stenosis, HLD, 40 pack year smoking history, history of aortic valve stenosis pending aortic valve replacement, presented 8/22 w/ CP. diaphoresis and SOB, found to have ST elevations in inferior leads and an elevated trop of 0.88. He was admitted for STEMI and sent to cath lab where he was found to have 85% occlusion to proximal RCA, 75% occlusion to mid RCA, 50% occlusion in the left main artery, and 75% occlusion to LAD proximal. His OM1 was found to be 100% thrombosed, was determined to be the culprit lesion, and STENT was placed. He was admitted after the procedure to the CCU for further monitoring. Of note, he was febrile on admission presumed to be LLL PNA for which he was treated w/Ceftriaxone and Azithromycin (end 8/26). Throughout the course of his hospital stay he was evaluated by the Cardiothoracic team for aortic valve replacement surgery and CABG to address the remainder of his occluded vessels; however, ultimately it was decided to pursue this outpatient, as per CT team recommendations. During his stay in the hospital, pre-op labs and imaging were completed, in preparation for this surgery. CTH no acute intracranial hemorrhage, mass effect or demarcated territorial infarction. CT abdomen: moderate focal stenosis left common iliac artery and mild pulmonary edema. ECHO also performed s/f severe AS- aortic valve area 0.6cm; no evidence of AR; mild MR. mild concentric LVH. LVSF mildly-moderately reduced w.EF 40-45% w.regional wall motional abnormalities.         On the day of discharge, the patient was seen and examined. Symptoms improved. Vital signs are stable. Labs and imaging reviewed. Patient is medically optimized and hemodynamically stable. Return precautions discussed, medication teach back done w/ patient, and importance of physician followup emphasized for which he verbalized understanding.  He will follow up with Dr. Mitchell and Dr. Da Silva as scheduled and will return for a planned AVR/CABG within the next few weeks.             DC Meds:    aspirin 81 mg oral tablet, chewable: 1 tab(s) orally once a day    clopidogrel 75 mg oral tablet: 1 tab(s) orally once a day    furosemide 20 mg oral tablet: 1 tab(s) orally every 24 hours    metoprolol succinate 25 mg oral tablet, extended release: 1 tab(s) orally every 24 hours    nicotine 21 mg/24 hr transdermal film, extended release: 1 patch transdermal once a day     pantoprazole 40 mg oral delayed release tablet: 1 tab(s) orally once a day (before a meal)    simvastatin 40 mg oral tablet: 1 tab(s) orally once a day (at bedtime) 55y/oM PMHx CAD (s/p proximal LAD stent in 2018), Aortic Stenosis, HLD, 40 pack year smoking history, history of aortic valve stenosis pending aortic valve replacement, presented 8/22 w/ CP. diaphoresis and SOB, found to have ST elevations in inferior leads and an elevated trop of 0.88. He was admitted for STEMI and sent to cath lab where he was found to have 85% occlusion to proximal RCA, 75% occlusion to mid RCA, 50% occlusion in the left main artery, and 75% occlusion to LAD proximal. His OM1 was found to be 100% thrombosed, was determined to be the culprit lesion, and STENT was placed. He was admitted after the procedure to the CCU for further monitoring. Of note, he was febrile on admission presumed to be LLL PNA for which he was treated w/Ceftriaxone and Azithromycin (end 8/26). Throughout the course of his hospital stay he was evaluated by the Cardiothoracic team for aortic valve replacement surgery and CABG to address the remainder of his occluded vessels; however, ultimately it was decided to pursue this outpatient, as per CT team recommendations. During his stay in the hospital, pre-op labs and imaging were completed, in preparation for this surgery. CTH no acute intracranial hemorrhage, mass effect or demarcated territorial infarction. CT abdomen: moderate focal stenosis left common iliac artery and mild pulmonary edema. ECHO also performed s/f severe AS- aortic valve area 0.6cm; no evidence of AR; mild MR. mild concentric LVH. LVSF mildly-moderately reduced w.EF 40-45% w.regional wall motional abnormalities.         On the day of discharge, the patient was seen and examined. Symptoms improved. Vital signs are stable. Labs and imaging reviewed. Patient is medically optimized and hemodynamically stable. Return precautions discussed, medication teach back done w/ patient, and importance of physician followup emphasized for which he verbalized understanding.  He will follow up with Dr. Mitchell and Dr. Landers as scheduled and will return for a planned AVR/CABG within the next few weeks.             DC Meds:    aspirin 81 mg oral tablet, chewable: 1 tab(s) orally once a day    clopidogrel 75 mg oral tablet: 1 tab(s) orally once a day    furosemide 20 mg oral tablet: 1 tab(s) orally every 24 hours    metoprolol succinate 25 mg oral tablet, extended release: 1 tab(s) orally every 24 hours    nicotine 21 mg/24 hr transdermal film, extended release: 1 patch transdermal once a day     pantoprazole 40 mg oral delayed release tablet: 1 tab(s) orally once a day (before a meal)    simvastatin 40 mg oral tablet: 1 tab(s) orally once a day (at bedtime) 55y/oM PMHx CAD (s/p proximal LAD stent in 2018), Aortic Stenosis, HLD, 40 pack year smoking history, history of aortic valve stenosis pending aortic valve replacement, presented 8/22 w/ CP. diaphoresis and SOB, found to have ST elevations in inferior leads and an elevated trop of 0.88. He was admitted for STEMI and sent to cath lab where he was found to have 85% occlusion to proximal RCA, 75% occlusion to mid RCA, 50% occlusion in the left main artery, and 75% occlusion to LAD proximal. His OM1 was found to be 100% thrombosed, was determined to be the culprit lesion, and STENT was placed. He was admitted after the procedure to the CCU for further monitoring. Of note, he was febrile on admission presumed to be LLL PNA for which he was treated w/Ceftriaxone and Azithromycin (end 8/26). Throughout the course of his hospital stay he was evaluated by the Cardiothoracic team for aortic valve replacement surgery and CABG to address the remainder of his occluded vessels; however, ultimately it was decided to pursue this outpatient, as per CT team recommendations. During his stay in the hospital, pre-op labs and imaging were completed, in preparation for this surgery. CTH no acute intracranial hemorrhage, mass effect or demarcated territorial infarction. CT abdomen: moderate focal stenosis left common iliac artery and mild pulmonary edema. ECHO also performed s/f severe AS- aortic valve area 0.6cm; no evidence of AR; mild MR. mild concentric LVH. LVSF mildly-moderately reduced w.EF 40-45% w.regional wall motional abnormalities.         On the day of discharge, the patient was seen and examined. Symptoms improved. Vital signs are stable. Labs and imaging reviewed. Patient is medically optimized and hemodynamically stable. Return precautions discussed, medication teach back done w/ patient, and importance of physician followup emphasized for which he verbalized understanding.  He will follow up with Dr. Mitchell and Dr. Landers as scheduled on 9/8/20 at 2PM and will return for a planned AVR/CABG within the next few weeks.             DC Meds:    aspirin 81 mg oral tablet, chewable: 1 tab(s) orally once a day    clopidogrel 75 mg oral tablet: 1 tab(s) orally once a day    furosemide 20 mg oral tablet: 1 tab(s) orally every 24 hours    metoprolol succinate 25 mg oral tablet, extended release: 1 tab(s) orally every 24 hours    nicotine 21 mg/24 hr transdermal film, extended release: 1 patch transdermal once a day     pantoprazole 40 mg oral delayed release tablet: 1 tab(s) orally once a day (before a meal)    simvastatin 40 mg oral tablet: 1 tab(s) orally once a day (at bedtime)

## 2020-08-25 NOTE — CHART NOTE - NSCHARTNOTEFT_GEN_A_CORE
CCU Fellow Event Note    Cardiology fellow called to bedside at ~13:15 as patient hypotensive to SBPs in 70s.  Nursing staff endorsed that pt was s/p BM, had become bradycardic to HR 40s and SBPs in 70s.  Upon arrival by CCU fellow, pt c/o chest pressure reminiscent of that on admission.  CP substernal, pressure-like, radiating to head, and a/w SOB/diaphoresis.    At time of evaluation:  T(C): 37.3 (25 Aug 2020 13:00), Max: 38.2 (24 Aug 2020 17:35)  T(F): 99.1 (25 Aug 2020 13:00), Max: 100.8 (24 Aug 2020 17:35)  HR: 108 (25 Aug 2020 12:30) (85 - 117)  BP: 78/61 (25 Aug 2020 12:30) (69/50 - 130/69)  BP(mean): 66 (25 Aug 2020 12:30) (55 - 89)  ABP: --  ABP(mean): --  RR: 63 (25 Aug 2020 12:30) (12 - 65)  SpO2: 100% (25 Aug 2020 12:30) (94% - 100%)  Gen: patient initially in acute distress 2/2 CP, tachypneic diaphoretic.  CV: tachycardic, 3/6 crescendo decrescendo murmur heard best at RUSB and unchanged from prior        no JVD, peripherally warm and well perfused w/o edema.  Pulm: tachypneic, no w/r/r anteriorly    STAT bedside TTE and EKG unchanged from prior studies (8/24 and 5a this morning, respectively).  EKG w/ persistent inferior LEDA and TWI, unchanged from prior.  TTE w/ EF ~40% with regional wall motion abnormalities (hypokinesis of mid inferolateral, mid anterolateral and basal inferior wall of LV), unchanged from prior. No new valvulopathy.   On reassessment:  Gen: NAD.  CV: tachycardic, 3/6 crescendo decrescendo murmur heard best at RUSB and unchanged from prior        no JVD, peripherally warm and well perfused w/o edema.  Pulm: RR 20, no w/r/r anteriorly    Patient tx w/ 250cc NS bolus, after which, his BP improved (to SBPs 100s) and CP resolved.  A second 250cc bolus of NS given, and BPs improved further to 110s systolic.    Case d/w interventional and CCU attendings. CCU Fellow Event Note    Cardiology fellow called to bedside at ~13:15 as patient hypotensive to SBPs in 70s.  Nursing staff endorsed that pt was s/p BM, had become bradycardic to HR 40s and SBPs in 70s.  Upon arrival by CCU fellow, pt c/o chest pressure reminiscent of that on admission.  CP substernal, pressure-like, radiating to head, and a/w SOB/diaphoresis.    At time of evaluation:  T(C): 37.3 (25 Aug 2020 13:00), Max: 38.2 (24 Aug 2020 17:35)  T(F): 99.1 (25 Aug 2020 13:00), Max: 100.8 (24 Aug 2020 17:35)  HR: 108 (25 Aug 2020 12:30) (85 - 117)  BP: 78/61 (25 Aug 2020 12:30) (69/50 - 130/69)  BP(mean): 66 (25 Aug 2020 12:30) (55 - 89)  ABP: --  ABP(mean): --  RR: 63 (25 Aug 2020 12:30) (12 - 65)  SpO2: 100% (25 Aug 2020 12:30) (94% - 100%)  Gen: patient initially in acute distress 2/2 CP, tachypneic diaphoretic.  CV: tachycardic, 3/6 crescendo decrescendo murmur heard best at RUSB and unchanged from prior        no JVD, peripherally warm and well perfused w/o edema.  Pulm: tachypneic, no w/r/r anteriorly    STAT bedside TTE and EKG unchanged from prior studies (8/24 and 5a this morning, respectively).  EKG w/ persistent inferior LEDA and TWI, unchanged from prior. HR 96bpm.  TTE w/ EF ~40% with regional wall motion abnormalities (hypokinesis of mid inferolateral, mid anterolateral and basal inferior wall of LV), unchanged from prior. No new valvulopathy.   On reassessment:  Gen: NAD.  CV: tachycardic, 3/6 crescendo decrescendo murmur heard best at RUSB and unchanged from prior        no JVD, peripherally warm and well perfused w/o edema.  Pulm: RR 20, no w/r/r anteriorly    Patient tx w/ 250cc NS bolus, after which, his BP improved (to SBPs 100s) and CP resolved.  A second 250cc bolus of NS given, and BPs improved further to 110s systolic.    Case d/w interventional and CCU attendings.

## 2020-08-25 NOTE — DISCHARGE NOTE PROVIDER - NSDCMRMEDTOKEN_GEN_ALL_CORE_FT
simvastatin 40 mg oral tablet: 1 tab(s) orally once a day (at bedtime) aspirin 81 mg oral tablet, chewable: 1 tab(s) orally once a day  clopidogrel 75 mg oral tablet: 1 tab(s) orally once a day  furosemide 20 mg oral tablet: 1 tab(s) orally every 24 hours  metoprolol succinate 25 mg oral tablet, extended release: 1 tab(s) orally every 24 hours  nicotine 21 mg/24 hr transdermal film, extended release: 1 patch transdermal once a day   pantoprazole 40 mg oral delayed release tablet: 1 tab(s) orally once a day (before a meal)  simvastatin 40 mg oral tablet: 1 tab(s) orally once a day (at bedtime)

## 2020-08-25 NOTE — DISCHARGE NOTE PROVIDER - NSDCFUSCHEDAPPT_GEN_ALL_CORE_FT
DHARMESH DAS ; 09/02/2020 ; [facility] CT Surg [address] [facility] ; 09/02/2020 ; [facility] CT Surg [address] [facility] ; 09/08/2020 ; [facility] CT Surg [address]  [facility] ; 09/02/2020 ; [facility] CT Surg [address] [facility] ; 09/08/2020 ; [facility] CT Surg [address]  [facility] ; 09/02/2020 ; [facility] CT Surg [address] [facility] ; 09/08/2020 ; [facility] CT Surg [address]

## 2020-08-25 NOTE — DISCHARGE NOTE PROVIDER - REASON FOR NO CARDIAC REHABILITATION
Consult  REFERRED BY:  Satnam Aguirre MD    CHIEF COMPLAINT: Increasing tremors in his hands and legs      Subjective:     Ike Joseph is a 80 y.o. right-handed  male seen on an urgent work in basis at the request of Dr. Anthony Welch for evaluation of increasing tremors in his hands mainly but sometimes his legs, that seem to occur at any time without precipitating factors, to the point that he has difficulty writing or doing things with these tremors increasingly severe. They seem by history to be more related to action and static tremors and not a rest tremor, but the history is very vague from both the patient and his wife. He has very little tremor on exam today was difficult to be sure what his problem really is. He has had no new weakness or sensory loss, but still has a mild right-sided weakness from his previous stroke one year ago. He saw our nurse practitioner in February 2017 after his hospitalization and he seemed to be relatively stable but had to be put back on Plavix because he could not break the Aggrenox to put down his feeding tube since it cannot be crushed. He does have a pacemaker and cannot get an MRI. His CTA of the head and neck did not show any significant carotid disease, but he did have significant disease of both vertebral and basilar artery suggesting some vertebrobasilar insufficiency. He does have some chronic dizziness that seems to be more orthostatic, but we will recheck his carotid Doppler study just to check to make sure that that has not related to any type of carotid stenosis or progressive vertebrobasilar disease. He also has severe aortic stenosis and cardiology does not feel he is a candidate for surgery. He has a feeding tube because he cannot swallow after his throat cancer. He is somewhat weak and debilitated in general.  We will also check his thyroid functions, just to make sure is not hyperthyroid as a cause of his increasing tremors.   He has no family history of similar tremors, so there is nothing to suggest familial tremors. His examination does not suggest cogwheeling, rigidity, bradykinesia, or micrographia. He most likely has benign essential tremor but is very difficult to be sure by his history says he and his wife give a somewhat wandering and imprecise history. He has had no unusual headache, fever, trauma, meningismus, or new focal weakness or sensory loss or gait problems. No other new medical problems either. He has not been started on any new medications or neuroleptics that might cause tremors. Past Medical History:   Diagnosis Date    Antiplatelet or antithrombotic long-term use 12/4/2014    Anxiety disorder     Arrhythmia 2009    bradycardia    Arthritis     CAD (coronary artery disease)     s/p CABG 2002; Dr Reyna Haddad [facility]) 1996    tongue/throat cancer s/p surgery / radiation and 1 dose of chemo    Carotid artery stenosis     s/p bilateral stents    Chronic pain     left leg, lower back,     Depression     Diabetes ([address]     Type II    Esophageal dysmotility     s/p dilitation    Esophageal motility disorder 7/8/2013    Frequent simultaneous or failed contractions, low amplitude contractions  suggests severe myopathy or diffuse spasm. I suspect the latter. Achalasia  is not present.         GERD (gastroesophageal reflux disease)     Heart failure ([address] 10/2014     Cardiomyopathy:Pacemaker upgrade:Biv and AICD  Dr. Karan Orozco heart Dr. last visit 5/11/2015    Hepatitis C Dx 1996    treated at [facility] in past; as of 4/15/15 wife states pt currently not under any treatment    Hyperlipidemia     Hypertension     Myocardial infarct [facility]) 2013    Heart Cath: 40% LV EF, Stented distal LAD, patent Graft to circumflex    On tube feeding diet approx 2009    still has as of 9/28/15  (no po food/liquid/meds at all); Dr Deangelo MORAN [address] Other ill-defined conditions(799.89) 1996    1 dose of chemotherapy/radiation for tongue cancer    Other ill-defined conditions(799.89)     BPH    Other ill-defined conditions(799.89)     orthostatic hypotension    Pneumonia ~ April -May 2010    Stroke [facility]) approx     left side-left finger tips numb; no imbalance or memory loss; as of  not seeing neuro MD    Suicidal thoughts       Past Surgical History:   Procedure Laterality Date    ABDOMEN SURGERY [address]    peg tube    CABG, ARTERY-VEIN, THREE      HX CATARACT REMOVAL      bilateral    HX CHOLECYSTECTOMY      HX HEART CATHETERIZATION      Stented distal LAD    HX MOHS PROCEDURES      bilateral    HX ORTHOPAEDIC      back surgery times two    HX OTHER SURGICAL      Radical Left Neck    HX OTHER SURGICAL      NASAL POLYPS REMOVAL    HX OTHER SURGICAL  2010    TURP    HX PACEMAKER      HX PACEMAKER  10/28/14     Defibrillator: [facility] # [id number], serial # [id number]; Dr. Zoie Keys [phone]; Dr Antonio Jade      cystoscopy    NEUROLOGICAL PROCEDURE UNLISTED      cevical surgery    IL CHANGE GASTROSTOMY TUBE  2011         IL CHANGE GASTROSTOMY TUBE  2011         IL EGD INSERT GUIDE WIRE DILATOR PASSAGE ESOPHAGUS  2010         [place] EGD TRANSORAL BIOPSY SINGLE/MULTIPLE  2010         STOMACH SURGERY PROCEDURE UNLISTED  2011         VASCULAR SURGERY PROCEDURE UNLIST      bilateral carotid stents     Family History   Problem Relation Age of Onset    Heart Disease Father       at age 52 from CAD    Colon Cancer Mother     Cancer Mother      colon ca    Heart Disease Brother       Social History   Substance Use Topics    Smoking status: Never Smoker    Smokeless tobacco: Never Used    Alcohol use No       No current facility-administered medications for this visit. No current outpatient prescriptions on file.     Facility-Administered Medications Ordered in Other Visits:     sodium chloride (NS) flush 5-10 mL, 5-10 mL, IntraVENous, Q8H, Wilfredo Virgen MD, 10 mL at 05/07/18 2138    sodium chloride (NS) flush 5-10 mL, 5-10 mL, IntraVENous, PRN, Wilfredo Virgen MD    furosemide (LASIX) injection 40 mg, 40 mg, IntraVENous, Q12H, Man GROVER MD, 40 mg at 05/07/18 2133    albuterol-ipratropium (DUO-NEB) 2.5 MG-0.5 MG/3 ML, 3 mL, Nebulization, Q6H PRN, Edith Gonzalez MD    [START ON 5/8/2018] aspirin (ASPIRIN) tablet 325 mg, 325 mg, Oral, DAILY, Edith Gonzalez MD    [START ON 5/8/2018] atorvastatin (LIPITOR) tablet 40 mg, 40 mg, Oral, DAILY, Edith Gonzalez MD    [START ON 5/8/2018] clopidogrel (PLAVIX) tablet 75 mg, 75 mg, Oral, DAILY, Edith Gonzalez MD    [START ON 5/8/2018] cyanocobalamin (VITAMIN B12) tablet 1,000 mcg, 1,000 mcg, Oral, DAILY, Edith Gonzalez MD    famotidine (PEPCID) tablet 20 mg, 20 mg, Oral, BID, Man GROVER MD, 20 mg at 05/07/18 1813    gabapentin (NEURONTIN) 250 mg/5 mL solution 750 mg, 750 mg, Oral, TID, Man GROVER MD, 750 mg at 05/07/18 1813    [START ON 5/8/2018] insulin glargine (LANTUS) injection 14 Units, 14 Units, SubCUTAneous, DAILY, Edith Gonzalez MD    midodrine (PROAMITINE) tablet 10 mg, 10 mg, Oral, TID WITH MEALS, Man GROVRE MD, 10 mg at 05/07/18 1813    insulin lispro (HUMALOG) injection, , SubCUTAneous, AC&HS, Man GROVER MD, Stopped at 05/07/18 1630    glucose chewable tablet 16 g, 4 Tab, Oral, PRN, Edith Gonzalez MD    dextrose (D50W) injection syrg 12.5-25 g, 12.5-25 g, IntraVENous, PRN, Edith Gonzalez MD    glucagon (GLUCAGEN) injection 1 mg, 1 mg, IntraMUSCular, PRN, Edith Gonzalez MD    primidone (MYSOLINE) tablet 25 mg, 25 mg, Oral, QHS, Man GROVER MD, 25 mg at 05/07/18 2136    HYDROcodone-acetaminophen (NORCO) 5-325 mg per tablet 1 Tab, 1 Tab, Oral, Q6H PRN, Edith Gonzalez MD, 1 Tab at 05/07/18 2137    polyethylene glycol (MIRALAX) packet 17 g, 17 g, Oral, DAILY, Edith Gonzalez MD, Stopped at 05/07/18 2137        Allergies   Allergen Reactions    Demerol [Meperidine] Shortness of Breath    Paxil [Paroxetine Hcl] Unknown (comments)     Pt gets shaky and loses control of legs    Amoxicillin Rash    Cleocin [Clindamycin Hcl] Rash    Pcn [Penicillins] Rash        Review of Systems:  A comprehensive review of systems was negative except for: Constitutional: positive for fatigue and malaise  Respiratory: positive for pleurisy/chest pain, dyspnea on exertion or chronic bronchitis  Gastrointestinal: positive for dysphagia, dyspepsia, reflux symptoms, nausea and abdominal pain  Musculoskeletal: positive for myalgias, arthralgias, stiff joints, neck pain and back pain  Neurological: positive for coordination problems, tremor and weakness  Behvioral/Psych: positive for anxiety and depression   Vitals:    05/07/18 1044   BP: 110/52   Pulse: 80   Resp: 16   Temp: 98 °F (36.7 °C)   SpO2: 90%   Weight: 184 lb (83.5 kg)   Height: 5' 7\" (1.702 m)     Objective:     I        NEUROLOGICAL EXAM:     Appearance: The patient is well developed, well nourished, provides a coherent history and is in no acute distress. Mental Status: Oriented to time, place and person and the president, cognitive function and fund of knowledge is normal. Speech is fluent without aphasia or dysarthria. Mood and affect appropriate but depressed . Cranial Nerves:   Intact visual fields. Fundi are benign. RAY, EOM's full, no nystagmus, no ptosis. Facial sensation is normal. Corneal reflexes are not tested. Facial movement is asymmetric. Hearing is normal bilaterally. Palate is midline with normal sternocleidomastoid and trapezius muscles are normal. Tongue is midline. Neck without meningismus or bruits  Patient has marked limited range of motion of the cervical spine with pain in all directions  Temporal arteries are not tender or enlarged    Motor:  4/5 strength in upper and lower proximal and distal muscles, except for the right upper extremity which has strength about 3/5 associated with an arm drift and decreased rapid alternating movements in the right hand. Normal bulk and tone. No fasciculations. Reflexes:   Deep tendon reflexes 2+/4 on the right and 1+/4 on the left. No babinski or clonus present   Sensory:   Abnormal to touch, pinprick and temperature and vibration decreased in both feet. DSS is intact   Gait:  Normal gait though he has to move slowly due to his arthritis and his mild right hemiparesis . Tremor:   N  Minimal intention bilateral tremor noted, but no resting tremor noted and no cogwheeling or rigidity. Cerebellar:   Mildly abnormal Romberg and tandem cerebellar signs present. Neurovascular:  Abnormal heart sounds and irregular rhythm, peripheral pulses decreased, and no carotid bruits.            Assessment:       ICD-10-CM ICD-9-CM    1. Thrombotic stroke involving left middle cerebral artery (HCC) I63.312 434.01 cyanocobalamin 1,000 mcg tablet      T3 TOTAL      T4 (THYROXINE)      TSH 3RD GENERATION      DUPLEX CAROTID BILATERAL AMB NEURO      XR WRIST RT AP/LAT/OBL MIN 3V      primidone (MYSOLINE) 50 mg tablet   2. Vertebrobasilar occlusive disease G45.0 433.20 cyanocobalamin 1,000 mcg tablet      T3 TOTAL      T4 (THYROXINE)      TSH 3RD GENERATION      DUPLEX CAROTID BILATERAL AMB NEURO      XR WRIST RT AP/LAT/OBL MIN 3V      primidone (MYSOLINE) 50 mg tablet   3. Type 2 diabetes mellitus with diabetic neuropathy affecting both sides of body ([facility]) E11.42 250.60 cyanocobalamin 1,000 mcg tablet     357.2 T3 TOTAL      T4 (THYROXINE)      TSH 3RD GENERATION      DUPLEX CAROTID BILATERAL AMB NEURO      XR WRIST RT AP/LAT/OBL MIN 3V      primidone (MYSOLINE) 50 mg tablet   4. Weakness R53.1 780.79 cyanocobalamin 1,000 mcg tablet      T3 TOTAL      T4 (THYROXINE)      TSH 3RD GENERATION      DUPLEX CAROTID BILATERAL AMB NEURO      XR WRIST RT AP/LAT/OBL MIN 3V      primidone (MYSOLINE) 50 mg tablet   5.  Diabetic peripheral neuropathy associated with type 2 diabetes mellitus (HCC) E11.42 250.60 cyanocobalamin 1,000 mcg tablet     357.2 T3 TOTAL      T4 (THYROXINE)      TSH 3RD GENERATION      DUPLEX CAROTID BILATERAL AMB NEURO      XR WRIST RT AP/LAT/OBL MIN 3V      primidone (MYSOLINE) 50 mg tablet   6. Benign essential tremor syndrome G25.0 333.1 cyanocobalamin 1,000 mcg tablet      T3 TOTAL      T4 (THYROXINE)      TSH 3RD GENERATION      DUPLEX CAROTID BILATERAL AMB NEURO      XR WRIST RT AP/LAT/OBL MIN 3V      primidone (MYSOLINE) 50 mg tablet   7. Stenosis of both internal carotid arteries I65.23 433.10 cyanocobalamin 1,000 mcg tablet     433.30 T3 TOTAL      T4 (THYROXINE)      TSH 3RD GENERATION      DUPLEX CAROTID BILATERAL AMB NEURO      XR WRIST RT AP/LAT/OBL MIN 3V      primidone (MYSOLINE) 50 mg tablet   8. Hemiplegia and hemiparesis following cerebral infarction affecting right dominant side (HCC) I69.351 438.21 cyanocobalamin 1,000 mcg tablet      T3 TOTAL      T4 (THYROXINE)      TSH 3RD GENERATION      DUPLEX CAROTID BILATERAL AMB NEURO      XR WRIST RT AP/LAT/OBL MIN 3V      primidone (MYSOLINE) 50 mg tablet   9. Wrist pain, acute, right M25.531 719.43 cyanocobalamin 1,000 mcg tablet      T3 TOTAL      T4 (THYROXINE)      TSH 3RD GENERATION      DUPLEX CAROTID BILATERAL AMB NEURO      XR WRIST RT AP/LAT/OBL MIN 3V      primidone (MYSOLINE) 50 mg tablet     Active Problems:    * No active hospital problems. *      Plan:     Patient with progressive tremors, but on exam today there really are to apparent, but by his history suggest benign essential tremor  We will check thyroid test, and start him on Mysoline is advised of the side effect as well as dizziness, drowsiness, ataxia, sedation, or any side effect to call us immediately and he is also advised to watch for any GI side effects.   We will repeat his carotid Doppler study in view of his progressive dizziness, just to make sure there is no change in his vertebrobasilar insufficiency or vertebral and basilar artery stenosis  We spent 40 minutes with the patient and his wife going over his history, going over his exam, and discussing various medications and treatments, we will try the medication as above and keep everything else the same. If all else fails we may need to really do a CTA of his head and neck just to make sure there is no significant progression of disease there. We offered him Coumadin in the past for his vertebrobasilar disease but in view of his mother's history of bleeding complications on that medication he wants no part of it. We will see him again in 3-6 months time or earlier as needed, he will call me if any problem, he will check my chart for results of his test or give us a call then. Signed By: Lowell Steele MD     May 7, 2018       CC: Maribel Clarke MD  FAX: [phone]    This note will not be viewable in [address] Other...

## 2020-08-25 NOTE — PROGRESS NOTE ADULT - SUBJECTIVE AND OBJECTIVE BOX
OVERNIGHT EVENTS: BENJAMIN. Vital signs remained stable throughout the night.     SUBJECTIVE / INTERVAL HPI: Patient seen and examined at bedside. This morning, he was able to ambulate without needing oxygen, and his oxygen saturation remained at 95%. He also felt as though his shortness of breath had resolved, and felt as though he was able to breathe more easily. During rounds, it was decided that he would be given one more dose of 10 mg IV Lasix, as his chest Xray looked slightly more fluid overloaded than it had the day prior. At around 11 AM, he was given IV Lasix 10mg, and shortly afterwards, he was given Metoprolol 12.5. Around 12:30 he went to use the bathroom where he strained and once he got back to his bedside, he immediately became short of breath, diaphoretic, and was experiencing a pressure-like chest pain that left him unable to speak in complete sentences. He was assessed at bedside, his blood pressure dropped to 60s/40s and he became bradycardic to the 40s. Chest Xray and EKG were done STAT at bedside and revealed no changes compared to this morning, and he was given two fluid boluses of 250mL which resulted in resolution of his chest pain and shortness of breath, and a return to baseline hemodynamic status.     VITAL SIGNS:  Vital Signs Last 24 Hrs  T(C): 37.3 (25 Aug 2020 13:00), Max: 38.2 (24 Aug 2020 17:35)  T(F): 99.1 (25 Aug 2020 13:00), Max: 100.8 (24 Aug 2020 17:35)  HR: 108 (25 Aug 2020 12:30) (85 - 117)  BP: 78/61 (25 Aug 2020 12:30) (69/50 - 130/69)  BP(mean): 66 (25 Aug 2020 12:30) (55 - 89)  RR: 63 (25 Aug 2020 12:30) (12 - 65)  SpO2: 100% (25 Aug 2020 12:30) (94% - 100%)    PHYSICAL EXAM:    General: NAD  Cardiovascular: +S1/S2; RRR  Respiratory: CTA B/L; bibasilar wheezes and crackles  Gastrointestinal: soft, NT/ND; +BSx4  Extremities: WWP; no edema, clubbing or cyanosis  Vascular: 2+ radial, DP/PT pulses B/L  Neurological: AAOx3; no focal deficits    MEDICATIONS:  MEDICATIONS  (STANDING):  albuterol/ipratropium for Nebulization 3 milliLiter(s) Nebulizer every 6 hours  aspirin  chewable 81 milliGRAM(s) Oral daily  atorvastatin 80 milliGRAM(s) Oral at bedtime  azithromycin  IVPB 500 milliGRAM(s) IV Intermittent every 24 hours  cefTRIAXone   IVPB 1000 milliGRAM(s) IV Intermittent every 24 hours  chlorhexidine 4% Liquid 1 Application(s) Topical <User Schedule>  enoxaparin Injectable 40 milliGRAM(s) SubCutaneous every 24 hours  melatonin 5 milliGRAM(s) Oral at bedtime  metoprolol succinate ER 25 milliGRAM(s) Oral every 24 hours  nicotine - 21 mG/24Hr(s) Patch 1 patch Transdermal daily  ticagrelor 90 milliGRAM(s) Oral every 12 hours  vasopressin Infusion 0.04 Unit(s)/Min (2.4 mL/Hr) IV Continuous <Continuous>    MEDICATIONS  (PRN):  acetaminophen   Tablet .. 650 milliGRAM(s) Oral every 6 hours PRN Temp greater or equal to 38C (100.4F), Moderate Pain (4 - 6)  benzonatate 100 milliGRAM(s) Oral three times a day PRN Coughing  polyethylene glycol 3350 17 Gram(s) Oral daily PRN Constipation      ALLERGIES:  Allergies    No Known Allergies    Intolerances        LABS:                        14.5   12.62 )-----------( 269      ( 25 Aug 2020 12:39 )             45.0     08-25    141  |  101  |  20  ----------------------------<  119<H>  4.7   |  22  |  0.93    Ca    10.0      25 Aug 2020 12:39  Phos  4.4     08-25  Mg     2.3     08-25    TPro  8.6<H>  /  Alb  4.2  /  TBili  0.5  /  DBili  x   /  AST  83<H>  /  ALT  108<H>  /  AlkPhos  94  08-25    PT/INR - ( 25 Aug 2020 12:39 )   PT: 12.7 sec;   INR: 1.06          PTT - ( 25 Aug 2020 12:39 )  PTT:31.1 sec    CAPILLARY BLOOD GLUCOSE      POCT Blood Glucose.: 194 mg/dL (25 Aug 2020 12:28)      RADIOLOGY & ADDITIONAL TESTS: Reviewed.    ASSESSMENT:    PLAN: Transfer Note: CCU to Cardiothoracic Surgery:    Mr. Alexandrea Winter is a 55 year old male with past medical history significant for CAD (s/p proximal LAD stent in 2018), Aortic Stenosis, HLD, 40 pack year smoking history, history of aortic valve stenosis pending aortic valve replacement, presented Saturday 8/22 morning from his home with left sided chest pain, tightness, diaphoresis, and shortness of breath. He was found to have ST elevations in inferior leads and an elevated trop of 0.88, and was admitted ultimately for STEMI and sent emergently to cath lab. In the cath lab, he was found to have 85% occlusion to his proximal RCA, 75% occlusion to his mid RCA, 50% occlusion in the left main artery, and 75% occlusion to LAD proximal. His OM1 was found to be 100% thrombosed, was determined to be the culprit lesion, and 1 STEPHENIE was placed. He was admitted after the procedure to the CCU for further monitoring. He was given Aspirin 81, Brillanta 90mg BID, Lipitor 80 mg for his post STEMI management, and he was given two doses of Lasix 10 mg IV. He also was given Duo Nebs for his presumed COPD. He was febrile during his first day of admission, with source presumed to be left lower lobe pneumonia, for which he was given ceftriaxone and azithromycin, both of which will end on 8/26. Throughout the course of his hospital stay he was evaluated by the Cardiothoracic team for aortic valve replacement surgery and CABG to address the remainder of his occluded vessels, and during his stay in the hospital, pre-op labs and imaging were completed, in preparation for this surgery. He is being transferred from CCU to Cardiothoracic service for his upcoming aortic valve replacement.     OVERNIGHT EVENTS: BENJAMIN. Vital signs remained stable throughout the night.     SUBJECTIVE / INTERVAL HPI: Patient seen and examined at bedside. This morning, he was able to ambulate without needing oxygen, and his oxygen saturation remained at 95%. He also felt as though his shortness of breath had resolved, and felt as though he was able to breathe more easily. During rounds, it was decided that he would be given one more dose of 10 mg IV Lasix, as his chest Xray looked slightly more fluid overloaded than it had the day prior. At around 11 AM, he was given IV Lasix 10mg, and shortly afterwards, he was given Metoprolol 12.5. Around 12:30 he went to use the bathroom where he strained and once he got back to his bedside, he immediately became short of breath, diaphoretic, and was experiencing a pressure-like chest pain that left him unable to speak in complete sentences. He was assessed at bedside, his blood pressure dropped to 60s/40s and he became bradycardic to the 40s. Chest Xray and EKG were done STAT at bedside and revealed no changes compared to this morning, and he was given two fluid boluses of 250mL which resulted in resolution of his chest pain and shortness of breath, and a return to baseline hemodynamic status.     VITAL SIGNS:  Vital Signs Last 24 Hrs  T(C): 37.3 (25 Aug 2020 13:00), Max: 38.2 (24 Aug 2020 17:35)  T(F): 99.1 (25 Aug 2020 13:00), Max: 100.8 (24 Aug 2020 17:35)  HR: 108 (25 Aug 2020 12:30) (85 - 117)  BP: 78/61 (25 Aug 2020 12:30) (69/50 - 130/69)  BP(mean): 66 (25 Aug 2020 12:30) (55 - 89)  RR: 63 (25 Aug 2020 12:30) (12 - 65)  SpO2: 100% (25 Aug 2020 12:30) (94% - 100%)    PHYSICAL EXAM:    General: NAD  Cardiovascular: +S1/S2; RRR  Respiratory: CTA B/L; bibasilar wheezes and crackles  Gastrointestinal: soft, NT/ND; +BSx4  Extremities: WWP; no edema, clubbing or cyanosis  Vascular: 2+ radial, DP/PT pulses B/L  Neurological: AAOx3; no focal deficits    MEDICATIONS:  MEDICATIONS  (STANDING):  albuterol/ipratropium for Nebulization 3 milliLiter(s) Nebulizer every 6 hours  aspirin  chewable 81 milliGRAM(s) Oral daily  atorvastatin 80 milliGRAM(s) Oral at bedtime  azithromycin  IVPB 500 milliGRAM(s) IV Intermittent every 24 hours  cefTRIAXone   IVPB 1000 milliGRAM(s) IV Intermittent every 24 hours  chlorhexidine 4% Liquid 1 Application(s) Topical <User Schedule>  enoxaparin Injectable 40 milliGRAM(s) SubCutaneous every 24 hours  melatonin 5 milliGRAM(s) Oral at bedtime  metoprolol succinate ER 25 milliGRAM(s) Oral every 24 hours  nicotine - 21 mG/24Hr(s) Patch 1 patch Transdermal daily  ticagrelor 90 milliGRAM(s) Oral every 12 hours  vasopressin Infusion 0.04 Unit(s)/Min (2.4 mL/Hr) IV Continuous <Continuous>    MEDICATIONS  (PRN):  acetaminophen   Tablet .. 650 milliGRAM(s) Oral every 6 hours PRN Temp greater or equal to 38C (100.4F), Moderate Pain (4 - 6)  benzonatate 100 milliGRAM(s) Oral three times a day PRN Coughing  polyethylene glycol 3350 17 Gram(s) Oral daily PRN Constipation      ALLERGIES:  Allergies    No Known Allergies    Intolerances        LABS:                        14.5   12.62 )-----------( 269      ( 25 Aug 2020 12:39 )             45.0     08-25    141  |  101  |  20  ----------------------------<  119<H>  4.7   |  22  |  0.93    Ca    10.0      25 Aug 2020 12:39  Phos  4.4     08-25  Mg     2.3     08-25    TPro  8.6<H>  /  Alb  4.2  /  TBili  0.5  /  DBili  x   /  AST  83<H>  /  ALT  108<H>  /  AlkPhos  94  08-25    PT/INR - ( 25 Aug 2020 12:39 )   PT: 12.7 sec;   INR: 1.06          PTT - ( 25 Aug 2020 12:39 )  PTT:31.1 sec    CAPILLARY BLOOD GLUCOSE      POCT Blood Glucose.: 194 mg/dL (25 Aug 2020 12:28)      RADIOLOGY & ADDITIONAL TESTS: Reviewed.    ASSESSMENT:    PLAN: Mr. Alexandrea Winter is a 55 year old male with past medical history significant for CAD (s/p proximal LAD stent in 2018), Aortic Stenosis, HLD, 40 pack year smoking history, history of aortic valve stenosis pending aortic valve replacement, presented Saturday 8/22 morning from his home with left sided chest pain, tightness, diaphoresis, and shortness of breath. He was found to have ST elevations in inferior leads and an elevated trop of 0.88, and was admitted ultimately for STEMI and sent emergently to cath lab. In the cath lab, he was found to have 85% occlusion to his proximal RCA, 75% occlusion to his mid RCA, 50% occlusion in the left main artery, and 75% occlusion to LAD proximal. His OM1 was found to be 100% thrombosed, was determined to be the culprit lesion, and 1 STEPHENIE was placed. He was admitted after the procedure to the CCU for further monitoring. He was given Aspirin 81, Brillanta 90mg BID, Lipitor 80 mg for his post STEMI management, and he was given two doses of Lasix 10 mg IV. He also was given Duo Nebs for his presumed COPD. He was febrile during his first day of admission, with source presumed to be left lower lobe pneumonia, for which he was given ceftriaxone and azithromycin, both of which will end on 8/26. Throughout the course of his hospital stay he was evaluated by the Cardiothoracic team for aortic valve replacement surgery and CABG to address the remainder of his occluded vessels, and during his stay in the hospital, pre-op labs and imaging were completed, in preparation for this surgery. He is being transferred from CCU to Cardiothoracic service for his upcoming aortic valve replacement.     OVERNIGHT EVENTS: BENJAMIN. Vital signs remained stable throughout the night.     SUBJECTIVE / INTERVAL HPI: Patient seen and examined at bedside. This morning, he was able to ambulate without needing oxygen, and his oxygen saturation remained at 95%. He also felt as though his shortness of breath had resolved, and felt as though he was able to breathe more easily. During rounds, it was decided that he would be given one more dose of 10 mg IV Lasix, as his chest Xray looked slightly more fluid overloaded than it had the day prior. At around 11 AM, he was given IV Lasix 10mg, and shortly afterwards, he was given Metoprolol 12.5. Around 12:30 he went to use the bathroom where he strained and once he got back to his bedside, he immediately became short of breath, diaphoretic, and was experiencing a pressure-like chest pain that left him unable to speak in complete sentences. He was assessed at bedside, his blood pressure dropped to 60s/40s and he became bradycardic to the 40s. Chest Xray and EKG were done STAT at bedside and revealed no changes compared to this morning, and he was given two fluid boluses of 250mL which resulted in resolution of his chest pain and shortness of breath, and a return to baseline hemodynamic status.     VITAL SIGNS:  Vital Signs Last 24 Hrs  T(C): 37.3 (25 Aug 2020 13:00), Max: 38.2 (24 Aug 2020 17:35)  T(F): 99.1 (25 Aug 2020 13:00), Max: 100.8 (24 Aug 2020 17:35)  HR: 108 (25 Aug 2020 12:30) (85 - 117)  BP: 78/61 (25 Aug 2020 12:30) (69/50 - 130/69)  BP(mean): 66 (25 Aug 2020 12:30) (55 - 89)  RR: 63 (25 Aug 2020 12:30) (12 - 65)  SpO2: 100% (25 Aug 2020 12:30) (94% - 100%)    PHYSICAL EXAM:    General: NAD  Cardiovascular: +S1/S2; RRR  Respiratory: CTA B/L; bibasilar wheezes and crackles  Gastrointestinal: soft, NT/ND; +BSx4  Extremities: WWP; no edema, clubbing or cyanosis  Vascular: 2+ radial, DP/PT pulses B/L  Neurological: AAOx3; no focal deficits    MEDICATIONS:  MEDICATIONS  (STANDING):  albuterol/ipratropium for Nebulization 3 milliLiter(s) Nebulizer every 6 hours  aspirin  chewable 81 milliGRAM(s) Oral daily  atorvastatin 80 milliGRAM(s) Oral at bedtime  azithromycin  IVPB 500 milliGRAM(s) IV Intermittent every 24 hours  cefTRIAXone   IVPB 1000 milliGRAM(s) IV Intermittent every 24 hours  chlorhexidine 4% Liquid 1 Application(s) Topical <User Schedule>  enoxaparin Injectable 40 milliGRAM(s) SubCutaneous every 24 hours  melatonin 5 milliGRAM(s) Oral at bedtime  metoprolol succinate ER 25 milliGRAM(s) Oral every 24 hours  nicotine - 21 mG/24Hr(s) Patch 1 patch Transdermal daily  ticagrelor 90 milliGRAM(s) Oral every 12 hours  vasopressin Infusion 0.04 Unit(s)/Min (2.4 mL/Hr) IV Continuous <Continuous>    MEDICATIONS  (PRN):  acetaminophen   Tablet .. 650 milliGRAM(s) Oral every 6 hours PRN Temp greater or equal to 38C (100.4F), Moderate Pain (4 - 6)  benzonatate 100 milliGRAM(s) Oral three times a day PRN Coughing  polyethylene glycol 3350 17 Gram(s) Oral daily PRN Constipation      ALLERGIES:  Allergies    No Known Allergies    Intolerances        LABS:                        14.5   12.62 )-----------( 269      ( 25 Aug 2020 12:39 )             45.0     08-25    141  |  101  |  20  ----------------------------<  119<H>  4.7   |  22  |  0.93    Ca    10.0      25 Aug 2020 12:39  Phos  4.4     08-25  Mg     2.3     08-25    TPro  8.6<H>  /  Alb  4.2  /  TBili  0.5  /  DBili  x   /  AST  83<H>  /  ALT  108<H>  /  AlkPhos  94  08-25    PT/INR - ( 25 Aug 2020 12:39 )   PT: 12.7 sec;   INR: 1.06          PTT - ( 25 Aug 2020 12:39 )  PTT:31.1 sec    CAPILLARY BLOOD GLUCOSE      POCT Blood Glucose.: 194 mg/dL (25 Aug 2020 12:28)      RADIOLOGY & ADDITIONAL TESTS: Reviewed.    ASSESSMENT:    PLAN: HOSPITAL COURSE: Mr. Alexandrea Winter is a 55 year old male with past medical history significant for CAD (s/p proximal LAD stent in 2018), Aortic Stenosis, HLD, 40 pack year smoking history, history of aortic valve stenosis pending aortic valve replacement, presented Saturday 8/22 morning from his home with left sided chest pain, tightness, diaphoresis, and shortness of breath. He was found to have ST elevations in inferior leads and an elevated trop of 0.88, and was admitted ultimately for STEMI and sent emergently to cath lab. In the cath lab, he was found to have 85% occlusion to his proximal RCA, 75% occlusion to his mid RCA, 50% occlusion in the left main artery, and 75% occlusion to LAD proximal. His OM1 was found to be 100% thrombosed, was determined to be the culprit lesion, and 1 STEPHENIE was placed. He was admitted after the procedure to the CCU for further monitoring. He was given Aspirin 81, Brillanta 90mg BID, Lipitor 80 mg for his post STEMI management, and he was given two doses of Lasix 10 mg IV. He also was given Duo Nebs for his presumed COPD. He was febrile during his first day of admission, with source presumed to be left lower lobe pneumonia, for which he was given ceftriaxone and azithromycin, both of which will end on 8/26. Throughout the course of his hospital stay he was evaluated by the Cardiothoracic team for aortic valve replacement surgery and CABG to address the remainder of his occluded vessels, and during his stay in the hospital, pre-op labs and imaging were completed, in preparation for this surgery. He is being transferred from CCU to Cardiothoracic service for his upcoming aortic valve replacement.     OVERNIGHT EVENTS: BENJAMIN. Vital signs remained stable throughout the night.     SUBJECTIVE / INTERVAL HPI: Patient seen and examined at bedside. This morning, he was able to ambulate without needing oxygen, and his oxygen saturation remained at 95%. He also felt as though his shortness of breath had resolved, and felt as though he was able to breathe more easily. During rounds, it was decided that he would be given one more dose of 10 mg IV Lasix, as his chest Xray looked slightly more fluid overloaded than it had the day prior. At around 11 AM, he was given IV Lasix 10mg, and shortly afterwards, he was given Metoprolol 12.5. Around 12:30 he went to use the bathroom where he strained and once he got back to his bedside, he immediately became short of breath, diaphoretic, and was experiencing a pressure-like chest pain that left him unable to speak in complete sentences. He was assessed at bedside, his blood pressure dropped to 60s/40s and he became bradycardic to the 40s. Chest Xray and EKG were done STAT at bedside and revealed no changes compared to this morning, and he was given two fluid boluses of 250mL which resulted in resolution of his chest pain and shortness of breath, and a return to baseline hemodynamic status.     VITAL SIGNS:  Vital Signs Last 24 Hrs  T(C): 37.3 (25 Aug 2020 13:00), Max: 38.2 (24 Aug 2020 17:35)  T(F): 99.1 (25 Aug 2020 13:00), Max: 100.8 (24 Aug 2020 17:35)  HR: 108 (25 Aug 2020 12:30) (85 - 117)  BP: 78/61 (25 Aug 2020 12:30) (69/50 - 130/69)  BP(mean): 66 (25 Aug 2020 12:30) (55 - 89)  RR: 63 (25 Aug 2020 12:30) (12 - 65)  SpO2: 100% (25 Aug 2020 12:30) (94% - 100%)    PHYSICAL EXAM:    General: NAD  Cardiovascular: +S1/S2; RRR  Respiratory: CTA B/L; bibasilar wheezes and crackles  Gastrointestinal: soft, NT/ND; +BSx4  Extremities: WWP; no edema, clubbing or cyanosis  Vascular: 2+ radial, DP/PT pulses B/L  Neurological: AAOx3; no focal deficits    MEDICATIONS:  MEDICATIONS  (STANDING):  albuterol/ipratropium for Nebulization 3 milliLiter(s) Nebulizer every 6 hours  aspirin  chewable 81 milliGRAM(s) Oral daily  atorvastatin 80 milliGRAM(s) Oral at bedtime  azithromycin  IVPB 500 milliGRAM(s) IV Intermittent every 24 hours  cefTRIAXone   IVPB 1000 milliGRAM(s) IV Intermittent every 24 hours  chlorhexidine 4% Liquid 1 Application(s) Topical <User Schedule>  enoxaparin Injectable 40 milliGRAM(s) SubCutaneous every 24 hours  melatonin 5 milliGRAM(s) Oral at bedtime  metoprolol succinate ER 25 milliGRAM(s) Oral every 24 hours  nicotine - 21 mG/24Hr(s) Patch 1 patch Transdermal daily  ticagrelor 90 milliGRAM(s) Oral every 12 hours  vasopressin Infusion 0.04 Unit(s)/Min (2.4 mL/Hr) IV Continuous <Continuous>    MEDICATIONS  (PRN):  acetaminophen   Tablet .. 650 milliGRAM(s) Oral every 6 hours PRN Temp greater or equal to 38C (100.4F), Moderate Pain (4 - 6)  benzonatate 100 milliGRAM(s) Oral three times a day PRN Coughing  polyethylene glycol 3350 17 Gram(s) Oral daily PRN Constipation      ALLERGIES:  Allergies    No Known Allergies    Intolerances        LABS:                        14.5   12.62 )-----------( 269      ( 25 Aug 2020 12:39 )             45.0     08-25    141  |  101  |  20  ----------------------------<  119<H>  4.7   |  22  |  0.93    Ca    10.0      25 Aug 2020 12:39  Phos  4.4     08-25  Mg     2.3     08-25    TPro  8.6<H>  /  Alb  4.2  /  TBili  0.5  /  DBili  x   /  AST  83<H>  /  ALT  108<H>  /  AlkPhos  94  08-25    PT/INR - ( 25 Aug 2020 12:39 )   PT: 12.7 sec;   INR: 1.06          PTT - ( 25 Aug 2020 12:39 )  PTT:31.1 sec    CAPILLARY BLOOD GLUCOSE      POCT Blood Glucose.: 194 mg/dL (25 Aug 2020 12:28)      RADIOLOGY & ADDITIONAL TESTS: Reviewed.    ASSESSMENT:    PLAN:

## 2020-08-25 NOTE — PROVIDER CONTACT NOTE (CHANGE IN STATUS NOTIFICATION) - ASSESSMENT
Pt was tachycardiac~115 upon returning from bathroom, became increasingly SOB, diaphoretic and hypotensive. Patient then became sharply bradycardic into the low 40's but rebounded back to high 90's after about 30 seconds.

## 2020-08-25 NOTE — PROVIDER CONTACT NOTE (CHANGE IN STATUS NOTIFICATION) - SITUATION
Md's called to bedside after pt attempted 2nd BM of day. Patient became tachycardic, moderately hypotensive, short of breath, and diaphoretic.

## 2020-08-25 NOTE — CHART NOTE - NSCHARTNOTEFT_GEN_A_CORE
Assessment:  55y Male with a PMHx of CAD (s/p PCI to prox LA in 2018), HLD, current smoker (40 PY smoking hx), and AS who presented to [facility] ED on 8/22 with left sided chest pain, tightness, diaphoresis. He was found to have ST elevation in inferior leads and a troponin of 0.88. He was admitted under cardiology for a STEMI and was emergently brought to cath lab where he was found to have 85% occlusion to his proximal RCA, 75% occlusion to his mid RCA, 50% occlusion in the left main artery, 75% occlusion to LAD proximal, and OM1 100% thrombosed. A STEPHENIE was placed to OM1. An echo was obtained showing severe AS. Given patient's multi vessel CAD and AS CT surgery was consulted for surgical evaluation.    Plan: Multi vessel CAD and severe AS  - Case discussed with Dr. Mitchell, plan for possible AVR/CABG in coming weeks  - Given patient's recent infarct he is currently at a higher risk for mortality with surgery during this admission  - Discussed with primary team, patient hypotensive after diuresis with chest pressure, improved with fluids  - If patient stable for discharge home please have him follow up with Dr. Mitchell as an outpatient: 9/2/2020 at 1:15 PM, call #[phone]

## 2020-08-25 NOTE — PROGRESS NOTE ADULT - ASSESSMENT
Mr. Alexandrea Winter is a 55 year old male with past medical history significant for CAD (s/p proximal LAD stent in 2018), HLD, 40 pack year smoking history, history of aortic valve stenosis pending aortic valve replacement, presented 8/22 AM with ST elevations in inferior leads and an elevated trop of 0.88, now s/p cath and 1 STEPHENIE placed in OM, admitted to CCU for further monitoring and management.     Cardio  #Inferolateral STEMI   He was found to have ST elevations in inferior leads and an elevated trop of 0.88. In the cath lab, he was found to have 85% occlusion to his proximal RCA, 75% occlusion to his mid RCA, 50% occlusion in the left main artery, and 75% occlusion to LAD proximal. His OM1 was found to be 100% thrombosed, was determined to be the culprit lesion, and STENT was placed.   -C/w brillanta 90 mg BID  -C/w aspirin 81 mg daily  -c/w lipitor 80 mg at bedtime  -f/u daily EKGs    #Severe aortic stenosis- pending aortic valve replacement   -Will follow up with CT surgery today in regards to performing AVR sooner rather than later, especially given hypotensive/bradycardic event today.   -f/u CT Chest for procedure, all other pre-procedural labs completed.     #HLD  -Lipid panel showed cholesterol 276, Direct , HDL: 34  -Continue with lipitor 80 mg daily    #HFrEF:   -Left ventricular systolic function is [id number]-oi-[id number] reduced with a calculated ejection fraction of 40-45% with regional wall motion abnormalities.   CXRs showing pulmonary edema, fluid congestion  -Lasix 10 mg IV today, now discontinued   -c/w Metoprolol 25g     Pulm  #Pulmonary Edema  -Lasix 10 mg IV today, now discontinued    #COPD-40 pack year smoking history with chronic cough  -On Duonebs Q6h and benzonatate 100 mg PO TID PRN for coughing  -f/u daily CXR  -nicotine patch transdermal daily     Endocrine  -No active issues    Renal  -No active issues    GI  -No active issues    ID  #Community acquired pneumonia: no fevers since 8/23 AM  -CXR 8/23 showing some consolidation in L middle lobe  -ceftriaxone 1 g q24h, last day andrei 8/26   -azithromycin 500 mg, last day tomorrow 8/26          Preventative Measures  E: replete as needed  N: DASH/TLC diet   DVT: SCDs, lovenox 40 mg subQ q24h  GI PPX: none Mr. Alexandrea Winter is a 55 year old male with past medical history significant for CAD (s/p proximal LAD stent in 2018), HLD, 40 pack year smoking history, history of aortic valve stenosis pending aortic valve replacement, presented 8/22 AM with ST elevations in inferior leads and an elevated trop of 0.88, now s/p cath and 1 STEPHENIE placed in OM, admitted to CCU for further monitoring and management.     Cardio  #Inferolateral STEMI   He was found to have ST elevations in inferior leads and an elevated trop of 0.88. In the cath lab, he was found to have 85% occlusion to his proximal RCA, 75% occlusion to his mid RCA, 50% occlusion in the left main artery, and 75% occlusion to LAD proximal. His OM1 was found to be 100% thrombosed, was determined to be the culprit lesion, and STENT was placed.   -C/w brillanta 90 mg BID  -C/w aspirin 81 mg daily  -c/w lipitor 80 mg at bedtime  -f/u daily EKGs    #Severe aortic stenosis- pending aortic valve replacement   -Will follow up with CT surgery today in regards to performing AVR sooner rather than later, especially given hypotensive/bradycardic event today.   -f/u CT Chest for procedure, all other pre-procedural labs completed.     #HLD  -Lipid panel showed cholesterol 276, Direct , HDL: 34  -Continue with lipitor 80 mg daily    #HFrEF:   -Left ventricular systolic function is lcqgiz-hq-bgtutdqirx reduced with a calculated ejection fraction of 40-45% with regional wall motion abnormalities.   CXRs showing pulmonary edema, fluid congestion  -Lasix 10 mg IV today, now discontinued   -c/w Metoprolol 25g     Pulm  #Pulmonary Edema  -Lasix 10 mg IV today, now discontinued    #COPD-40 pack year smoking history with chronic cough  -On Duonebs Q6h and benzonatate 100 mg PO TID PRN for coughing  -f/u daily CXR  -nicotine patch transdermal daily     Endocrine  -No active issues    Renal  -No active issues    GI  -No active issues    ID  #Community acquired pneumonia: no fevers since 8/23 AM  -CXR 8/23 showing some consolidation in L middle lobe  -ceftriaxone 1 g q24h, last day andrei 8/26   -azithromycin 500 mg, last day tomorrow 8/26          Preventative Measures  E: replete as needed  N: DASH/TLC diet   DVT: SCDs, lovenox 40 mg subQ q24h  GI PPX: none

## 2020-08-25 NOTE — DISCHARGE NOTE PROVIDER - CARE PROVIDER_API CALL
Marco Antonio Mitchell  SURGERY  [address], [address] [place] [address]  Phone: [phone]  Fax: [phone]  Scheduled Appointment: 09/08/2020 02:00 PM    Filipe Ladners)  Cardiovascular Disease; Internal Medicine; Interventional Cardiology  [address]  [place] [address]  Phone: [phone]  Fax: [phone]  Scheduled Appointment: 09/08/2020 02:00 PM

## 2020-08-26 LAB
ALBUMIN SERPL ELPH-MCNC: 3.5 G/DL — SIGNIFICANT CHANGE UP (ref 3.3–5)
ALP SERPL-CCNC: 80 U/L — SIGNIFICANT CHANGE UP (ref 40–120)
ALT FLD-CCNC: 78 U/L — HIGH (ref 10–45)
ANION GAP SERPL CALC-SCNC: 11 MMOL/L — SIGNIFICANT CHANGE UP (ref 5–17)
APTT BLD: 32 SEC — SIGNIFICANT CHANGE UP (ref 27.5–35.5)
AST SERPL-CCNC: 47 U/L — HIGH (ref 10–40)
BASOPHILS # BLD AUTO: 0.04 K/UL — SIGNIFICANT CHANGE UP (ref 0–0.2)
BASOPHILS NFR BLD AUTO: 0.5 % — SIGNIFICANT CHANGE UP (ref 0–2)
BILIRUB SERPL-MCNC: 0.6 MG/DL — SIGNIFICANT CHANGE UP (ref 0.2–1.2)
BUN SERPL-MCNC: 18 MG/DL — SIGNIFICANT CHANGE UP (ref 7–23)
CALCIUM SERPL-MCNC: 9.2 MG/DL — SIGNIFICANT CHANGE UP (ref 8.4–10.5)
CHLORIDE SERPL-SCNC: 104 MMOL/L — SIGNIFICANT CHANGE UP (ref 96–108)
CK MB CFR SERPL CALC: 3.3 NG/ML — SIGNIFICANT CHANGE UP (ref 0–6.7)
CK SERPL-CCNC: 116 U/L — SIGNIFICANT CHANGE UP (ref 30–200)
CO2 SERPL-SCNC: 24 MMOL/L — SIGNIFICANT CHANGE UP (ref 22–31)
CREAT SERPL-MCNC: 0.84 MG/DL — SIGNIFICANT CHANGE UP (ref 0.5–1.3)
EOSINOPHIL # BLD AUTO: 0.49 K/UL — SIGNIFICANT CHANGE UP (ref 0–0.5)
EOSINOPHIL NFR BLD AUTO: 5.6 % — SIGNIFICANT CHANGE UP (ref 0–6)
GLUCOSE SERPL-MCNC: 117 MG/DL — HIGH (ref 70–99)
HCT VFR BLD CALC: 38.5 % — LOW (ref 39–50)
HGB BLD-MCNC: 12.5 G/DL — LOW (ref 13–17)
IMM GRANULOCYTES NFR BLD AUTO: 0.6 % — SIGNIFICANT CHANGE UP (ref 0–1.5)
INR BLD: 1.1 — SIGNIFICANT CHANGE UP (ref 0.88–1.16)
LYMPHOCYTES # BLD AUTO: 1.35 K/UL — SIGNIFICANT CHANGE UP (ref 1–3.3)
LYMPHOCYTES # BLD AUTO: 15.6 % — SIGNIFICANT CHANGE UP (ref 13–44)
MAGNESIUM SERPL-MCNC: 2.1 MG/DL — SIGNIFICANT CHANGE UP (ref 1.6–2.6)
MCHC RBC-ENTMCNC: 29.3 PG — SIGNIFICANT CHANGE UP (ref 27–34)
MCHC RBC-ENTMCNC: 32.5 GM/DL — SIGNIFICANT CHANGE UP (ref 32–36)
MCV RBC AUTO: 90.4 FL — SIGNIFICANT CHANGE UP (ref 80–100)
MONOCYTES # BLD AUTO: 0.62 K/UL — SIGNIFICANT CHANGE UP (ref 0–0.9)
MONOCYTES NFR BLD AUTO: 7.1 % — SIGNIFICANT CHANGE UP (ref 2–14)
NEUTROPHILS # BLD AUTO: 6.13 K/UL — SIGNIFICANT CHANGE UP (ref 1.8–7.4)
NEUTROPHILS NFR BLD AUTO: 70.6 % — SIGNIFICANT CHANGE UP (ref 43–77)
NRBC # BLD: 0 /100 WBCS — SIGNIFICANT CHANGE UP (ref 0–0)
PHOSPHATE SERPL-MCNC: 3.7 MG/DL — SIGNIFICANT CHANGE UP (ref 2.5–4.5)
PLATELET # BLD AUTO: 230 K/UL — SIGNIFICANT CHANGE UP (ref 150–400)
POTASSIUM SERPL-MCNC: 4.3 MMOL/L — SIGNIFICANT CHANGE UP (ref 3.5–5.3)
POTASSIUM SERPL-SCNC: 4.3 MMOL/L — SIGNIFICANT CHANGE UP (ref 3.5–5.3)
PROT SERPL-MCNC: 7.2 G/DL — SIGNIFICANT CHANGE UP (ref 6–8.3)
PROTHROM AB SERPL-ACNC: 13.1 SEC — SIGNIFICANT CHANGE UP (ref 10.6–13.6)
RBC # BLD: 4.26 M/UL — SIGNIFICANT CHANGE UP (ref 4.2–5.8)
RBC # FLD: 13 % — SIGNIFICANT CHANGE UP (ref 10.3–14.5)
SODIUM SERPL-SCNC: 139 MMOL/L — SIGNIFICANT CHANGE UP (ref 135–145)
TROPONIN T SERPL-MCNC: 2.86 NG/ML — CRITICAL HIGH (ref 0–0.01)
WBC # BLD: 8.68 K/UL — SIGNIFICANT CHANGE UP (ref 3.8–10.5)
WBC # FLD AUTO: 8.68 K/UL — SIGNIFICANT CHANGE UP (ref 3.8–10.5)

## 2020-08-26 PROCEDURE — 99233 SBSQ HOSP IP/OBS HIGH 50: CPT

## 2020-08-26 PROCEDURE — 71045 X-RAY EXAM CHEST 1 VIEW: CPT | Mod: 26

## 2020-08-26 PROCEDURE — 99291 CRITICAL CARE FIRST HOUR: CPT

## 2020-08-26 PROCEDURE — 93306 TTE W/DOPPLER COMPLETE: CPT | Mod: 26

## 2020-08-26 RX ORDER — FUROSEMIDE 40 MG
10 TABLET ORAL ONCE
Refills: 0 | Status: COMPLETED | OUTPATIENT
Start: 2020-08-26 | End: 2020-08-26

## 2020-08-26 RX ADMIN — Medication 81 MILLIGRAM(S): at 11:08

## 2020-08-26 RX ADMIN — CHLORHEXIDINE GLUCONATE 1 APPLICATION(S): 213 SOLUTION TOPICAL at 06:13

## 2020-08-26 RX ADMIN — ENOXAPARIN SODIUM 40 MILLIGRAM(S): 100 INJECTION SUBCUTANEOUS at 20:41

## 2020-08-26 RX ADMIN — Medication 25 MILLIGRAM(S): at 13:16

## 2020-08-26 RX ADMIN — Medication 3 MILLILITER(S): at 16:34

## 2020-08-26 RX ADMIN — Medication 3 MILLILITER(S): at 09:37

## 2020-08-26 RX ADMIN — TICAGRELOR 90 MILLIGRAM(S): 90 TABLET ORAL at 17:01

## 2020-08-26 RX ADMIN — ATORVASTATIN CALCIUM 80 MILLIGRAM(S): 80 TABLET, FILM COATED ORAL at 20:41

## 2020-08-26 RX ADMIN — CEFTRIAXONE 100 MILLIGRAM(S): 500 INJECTION, POWDER, FOR SOLUTION INTRAMUSCULAR; INTRAVENOUS at 08:19

## 2020-08-26 RX ADMIN — Medication 3 MILLILITER(S): at 20:42

## 2020-08-26 RX ADMIN — TICAGRELOR 90 MILLIGRAM(S): 90 TABLET ORAL at 05:26

## 2020-08-26 RX ADMIN — Medication 3 MILLILITER(S): at 03:14

## 2020-08-26 RX ADMIN — Medication 10 MILLIGRAM(S): at 16:55

## 2020-08-26 NOTE — PROGRESS NOTE ADULT - SUBJECTIVE AND OBJECTIVE BOX
Subjective:  - still with some SOB with exertion and events noted (episode of lightheadedness along with chest pain)  - Events/Chart from overnight reviewed    PAST MEDICAL & SURGICAL HISTORY:  Hypertension  Hypercholesteremia  Stented coronary artery  S/P appendectomy      Vital Signs Last 24 Hrs  T(C): 37.1 (26 Aug 2020 06:00), Max: 37.3 (25 Aug 2020 13:00)  T(F): 98.8 (26 Aug 2020 06:00), Max: 99.1 (25 Aug 2020 13:00)  HR: 97 (26 Aug 2020 08:00) (84 - 114)  BP: 108/57 (26 Aug 2020 08:00) (69/50 - 117/56)  BP(mean): 72 (26 Aug 2020 08:00) (55 - 80)  RR: 31 (26 Aug 2020 08:00) (21 - 65)  SpO2: 96% (26 Aug 2020 08:00) (93% - 100%)   I&O's Detail    25 Aug 2020 07:01  -  26 Aug 2020 07:00  --------------------------------------------------------  IN:    Oral Fluid: 540 mL  Total IN: 540 mL    OUT:    Voided: 1350 mL  Total OUT: 1350 mL    Total NET: -810 mL        Daily     Daily     Physical Exam:   GEN: NAD, AAOx3  HEENT: MMM, no icterus  CV: S1 +ROYCE soft S2, RRR  Lung: CTAB  Abd: soft NT ND +BS  Ext: no c/c/e, no groin hematoma  Neuro: no focal neuro deficit    MEDICATIONS  (STANDING):  albuterol/ipratropium for Nebulization 3 milliLiter(s) Nebulizer every 6 hours  aspirin  chewable 81 milliGRAM(s) Oral daily  atorvastatin 80 milliGRAM(s) Oral at bedtime  cefTRIAXone   IVPB 1000 milliGRAM(s) IV Intermittent every 24 hours  chlorhexidine 4% Liquid 1 Application(s) Topical <User Schedule>  enoxaparin Injectable 40 milliGRAM(s) SubCutaneous every 24 hours  melatonin 5 milliGRAM(s) Oral at bedtime  metoprolol succinate ER 25 milliGRAM(s) Oral every 24 hours  nicotine - 21 mG/24Hr(s) Patch 1 patch Transdermal daily  ticagrelor 90 milliGRAM(s) Oral every 12 hours  vasopressin Infusion 0.04 Unit(s)/Min (2.4 mL/Hr) IV Continuous <Continuous>      LABS:                        12.5   8.68  )-----------( 230      ( 26 Aug 2020 05:53 )             38.5     08-26    139  |  104  |  18  ----------------------------<  117<H>  4.3   |  24  |  0.84    Ca    9.2      26 Aug 2020 05:53  Phos  3.7     08-26  Mg     2.1     08-26    TPro  7.2  /  Alb  3.5  /  TBili  0.6  /  DBili  x   /  AST  47<H>  /  ALT  78<H>  /  AlkPhos  80  08-26    CARDIAC MARKERS ( 26 Aug 2020 05:53 )  x     / 2.86 ng/mL / 116 U/L / x     / 3.3 ng/mL  CARDIAC MARKERS ( 25 Aug 2020 16:07 )  x     / 2.74 ng/mL / 124 U/L / x     / 3.3 ng/mL  CARDIAC MARKERS ( 25 Aug 2020 12:39 )  x     / 3.92 ng/mL / 170 U/L / x     / 3.4 ng/mL  CARDIAC MARKERS ( 25 Aug 2020 12:07 )  x     / 3.54 ng/mL / 158 U/L / x     / 3.2 ng/mL  CARDIAC MARKERS ( 25 Aug 2020 05:53 )  x     / 3.63 ng/mL / 153 U/L / x     / 3.1 ng/mL      PT/INR - ( 26 Aug 2020 05:53 )   PT: 13.1 sec;   INR: 1.10          PTT - ( 26 Aug 2020 05:53 )  PTT:32.0 sec    < from: TTE Echo Complete w/o Contrast w/ Doppler (08.22.20 @ 09:48) >  CONCLUSIONS:     1. The aortic valve is calcified (morphology not well seen). There is severe aortic stenosis. The peak transvalvular velocity is 3.52 m/s, the mean transvalvular gradient is 35 mmHg, and the LVOT/AV velocity ratio is 0.19. The peak transaortic gradient is 51 mmHg. The aortic valve area (estimated via the continuity method) is 0.6 cm². There is no evidence of aortic regurgitation.   2. Mild mitral regurgitation.   3. The right ventricle is normal in size. Right ventricular systolic function is normal.   4. There is mild concentric left ventricular hypertrophy. Left ventricular systolic function is ykkxqz-ox-seqqwyckgs reduced with a calculated ejection fraction of 40-45% with regional wall motion abnormalities. There is severe hypokinesis of mid inferolateral, mid anterolateral and basal inferior wall.   5. No pericardial effusion.    < end of copied text >    < from: Xray Chest 1 View- PORTABLE-Routine (08.25.20 @ 12:53) >    FINDINGS: Size, mediastinal and hilar contours are unchanged. Persistent pulmonary venous congestive change. No lobar consolidation or pleural collections. No pneumothorax.    IMPRESSION:  Megaly with persistent pulmonary venous congestive changes.    < end of copied text >

## 2020-08-26 NOTE — PROGRESS NOTE ADULT - ASSESSMENT
INCOMPLETE  Mr. Alexandrea Winter is a 55 year old male with past medical history significant for CAD (s/p proximal LAD stent in 2018), HLD, 40 pack year smoking history, history of aortic valve stenosis pending aortic valve replacement, presented 8/22 AM with ST elevations in inferior leads and an elevated trop of 0.88, now s/p cath and 1 STEPHENIE placed in OM, admitted to CCU for further monitoring and management.     Cardio  #Inferolateral STEMI   He was found to have ST elevations in inferior leads and an elevated trop of 0.88. In the cath lab, he was found to have 85% occlusion to his proximal RCA, 75% occlusion to his mid RCA, 50% occlusion in the left main artery, and 75% occlusion to LAD proximal. His OM1 was found to be 100% thrombosed, was determined to be the culprit lesion, and STENT was placed.   -C/w brillanta 90 mg BID  -C/w aspirin 81 mg daily  -c/w lipitor 80 mg at bedtime  -f/u daily EKGs    #Severe aortic stenosis- pending aortic valve replacement   -f/u with CT surgery   -f/u CT Chest, abdomen, head for pre-procedure workup    #HLD  -Lipid panel showed cholesterol 276, Direct , HDL: 34  -Continue with lipitor 80 mg daily    #HFrEF:   -Left ventricular systolic function is snqvcp-wl-bcnaeymbnm reduced with a calculated ejection fraction of 40-45% with regional wall motion abnormalities.   CXRs showing pulmonary edema, fluid congestion.   -c/w Metoprolol 25g     Pulm  #Pulmonary Edema  -Discontinued diuresis given tenuous blood pressures  -Monitor respiratory status      #COPD-40 pack year smoking history with chronic cough  -On Duonebs Q6h and benzonatate 100 mg PO TID PRN for coughing  -f/u daily CXR  -nicotine patch transdermal daily   -will have patient follow up with pulm once discharged    Endocrine  -No active issues    Renal  -No active issues    GI  -No active issues    ID  #Community acquired pneumonia: no fevers since 8/23 AM  -last day of CAP treatment today 8/26          Preventative Measures  E: replete as needed  N: DASH/TLC diet   DVT: SCDs, lovenox 40 mg subQ q24h  GI PPX: none INCOMPLETE  Mr. Alexandrea Winter is a 55 year old male with past medical history significant for CAD (s/p proximal LAD stent in 2018), HLD, 40 pack year smoking history, history of aortic valve stenosis pending aortic valve replacement, presented 8/22 AM with ST elevations in inferior leads and an elevated trop of 0.88, now s/p cath and 1 STEPHENIE placed in OM, admitted to CCU for further monitoring and management.     Cardio  #Inferolateral STEMI   He was found to have ST elevations in inferior leads and an elevated trop of 0.88. In the cath lab, he was found to have 85% occlusion to his proximal RCA, 75% occlusion to his mid RCA, 50% occlusion in the left main artery, and 75% occlusion to LAD proximal. His OM1 was found to be 100% thrombosed, was determined to be the culprit lesion, and STENT was placed.   -C/w brillanta 90 mg BID  -C/w aspirin 81 mg daily  -c/w lipitor 80 mg at bedtime  -f/u daily EKGs    #Severe aortic stenosis- pending aortic valve replacement   -f/u with CT surgery   -f/u CT Chest, abdomen, head for pre-procedure workup    #HLD  -Lipid panel showed cholesterol 276, Direct , HDL: 34  -Continue with lipitor 80 mg daily    #HFrEF:   -Left ventricular systolic function is rdkssq-gy-oemyqkwmys reduced with a calculated ejection fraction of 40-45% with regional wall motion abnormalities.   CXRs showing pulmonary edema, fluid congestion worse than yesterday likely secondary to 500 mL fluid bolus  -c/w Metoprolol 25g   -monitpr blood pressures, if able to tolerate, will give IV Lasix 10mg  Pulm  #Pulmonary Edema  -Discontinued diuresis given tenuous blood pressures  -Monitor respiratory status      #COPD-40 pack year smoking history with chronic cough  -On Duonebs Q6h and benzonatate 100 mg PO TID PRN for coughing  -f/u daily CXR  -nicotine patch transdermal daily   -will have patient follow up with pulm once discharged    Endocrine  -No active issues    Renal  -No active issues    GI  -No active issues    ID  #Community acquired pneumonia: no fevers since 8/23 AM  -last day of CAP treatment today 8/26          Preventative Measures  E: replete as needed  N: DASH/TLC diet   DVT: SCDs, lovenox 40 mg subQ q24h  GI PPX: none Mr. Alexandrea Winter is a 55 year old male with past medical history significant for CAD (s/p proximal LAD stent in 2018), HLD, 40 pack year smoking history, history of aortic valve stenosis pending aortic valve replacement, presented 8/22 AM with ST elevations in inferior leads and an elevated trop of 0.88, now s/p cath and 1 STEPHENIE placed in OM, admitted to CCU for further monitoring and management.     Cardio  #Inferolateral STEMI   He was found to have ST elevations in inferior leads and an elevated trop of 0.88. In the cath lab, he was found to have 85% occlusion to his proximal RCA, 75% occlusion to his mid RCA, 50% occlusion in the left main artery, and 75% occlusion to LAD proximal. His OM1 was found to be 100% thrombosed, was determined to be the culprit lesion, and STENT was placed.   -C/w brillanta 90 mg BID  -C/w aspirin 81 mg daily  -c/w lipitor 80 mg at bedtime  -f/u daily EKGs    #Severe aortic stenosis- pending aortic valve replacement   -f/u with CT surgery   -f/u CT Chest, abdomen, head for pre-procedure workup    #HLD  -Lipid panel showed cholesterol 276, Direct , HDL: 34  -Continue with lipitor 80 mg daily    #HFrEF:   -Left ventricular systolic function is rlrnwn-fd-bwabtjuxwe reduced with a calculated ejection fraction of 40-45% with regional wall motion abnormalities.   CXRs showing pulmonary edema, fluid congestion worse than yesterday likely secondary to 500 mL fluid bolus  -c/w Metoprolol 25g   -monitpr blood pressures, if able to tolerate, will give IV Lasix 10mg  Pulm  #Pulmonary Edema  -Discontinued diuresis given tenuous blood pressures  -Monitor respiratory status      #COPD-40 pack year smoking history with chronic cough  -On Duonebs Q6h and benzonatate 100 mg PO TID PRN for coughing  -f/u daily CXR  -nicotine patch transdermal daily   -will have patient follow up with pulm once discharged    Endocrine  -No active issues    Renal  -No active issues    GI  -No active issues    ID  #Community acquired pneumonia: no fevers since 8/23 AM  -last day of CAP treatment today 8/26          Preventative Measures  E: replete as needed  N: DASH/TLC diet   DVT: SCDs, lovenox 40 mg subQ q24h  GI PPX: none

## 2020-08-26 NOTE — CHART NOTE - NSCHARTNOTEFT_GEN_A_CORE
Admitting Diagnosis:   Patient is a 55y old  Male who presents with a chief complaint of STEMI (26 Aug 2020 08:39)      Consult: Yes [   ]  No [   ]    Reason for Initial Nutrition Assessment:      PAST MEDICAL & SURGICAL HISTORY:  Hypertension  Hypercholesteremia  Stented coronary artery  S/P appendectomy      Current Nutrition Order:    PO Intake: Good (%) [   ]  Fair (50-75%) [   ] Poor (<25%) [   ]    GI Issues:     Pain:    Skin Integrity:    Labs:   08-26    139  |  104  |  18  ----------------------------<  117<H>  4.3   |  24  |  0.84    Ca    9.2      26 Aug 2020 05:53  Phos  3.7     08-26  Mg     2.1     08-26    TPro  7.2  /  Alb  3.5  /  TBili  0.6  /  DBili  x   /  AST  47<H>  /  ALT  78<H>  /  AlkPhos  80  08-26    CAPILLARY BLOOD GLUCOSE        Nutritionally Pertinent Lab Values:    Medications:  MEDICATIONS  (STANDING):  albuterol/ipratropium for Nebulization 3 milliLiter(s) Nebulizer every 6 hours  aspirin  chewable 81 milliGRAM(s) Oral daily  atorvastatin 80 milliGRAM(s) Oral at bedtime  cefTRIAXone   IVPB 1000 milliGRAM(s) IV Intermittent every 24 hours  chlorhexidine 4% Liquid 1 Application(s) Topical <User Schedule>  enoxaparin Injectable 40 milliGRAM(s) SubCutaneous every 24 hours  melatonin 5 milliGRAM(s) Oral at bedtime  metoprolol succinate ER 25 milliGRAM(s) Oral every 24 hours  nicotine - 21 mG/24Hr(s) Patch 1 patch Transdermal daily  ticagrelor 90 milliGRAM(s) Oral every 12 hours  vasopressin Infusion 0.04 Unit(s)/Min (2.4 mL/Hr) IV Continuous <Continuous>    MEDICATIONS  (PRN):  acetaminophen   Tablet .. 650 milliGRAM(s) Oral every 6 hours PRN Temp greater or equal to 38C (100.4F), Moderate Pain (4 - 6)  benzonatate 100 milliGRAM(s) Oral three times a day PRN Coughing  polyethylene glycol 3350 17 Gram(s) Oral daily PRN Constipation      Admitted Anthropometrics:    Weight:  Daily     Daily     Weight Change:     Nutrition Focused Physical Exam: Completed [   ]  Unable to complete [   ]  Muscle Wasting:  Subcutaneous Fat Wasting:    Estimated energy needs:     Subjective:   55 year old male with past medical history significant for CAD (s/p proximal LAD stent in 2018), HLD, 40 pack year smoking history, history of aortic valve stenosis pending aortic valve replacement, presented from his home with left sided chest pain, tightness, diaphoresis, and shortness of breath. He was found to have ST elevations in inferior leads and an elevated trop of 0.88, and was admitted ultimately for STEMI and sent emergently to cath lab. In the cath lab, he was found to have 85% occlusion to his proximal RCA, 75% occlusion to his mid RCA, 50% occlusion in the left main artery, and 75% occlusion to LAD proximal. His OM1 was found to be 100% thrombosed, was determined to be the culprit lesion, and STENT was placed. Pt admitted after the procedure to the CCU for further monitoring. BERKLEY with Noted LVEF 40-45%, CXRs showing pulmonary edema, fluid congestion.   Pt had been pending d/c 8/25 however, became short of breath, diaphoretic and experienced a pressure-like chest pain after straining while using the bathroom. Pt hypotensive, BP dropped to 60s/40s and became bradycardic; CT surgery re-consulted to consider performing AVR while inpatient rather than post d/c due to recent event occurence.    Nutrition Diagnosis:    [  ] No active nutrition diagnosis at this time  [  ] Current medical condition precludes nutrition intervention    Goal:    Recommendations:    Education:     Risk Level: High [   ] Moderate [   ] Low [   ] Admitting Diagnosis:   Patient is a 55y old  Male who presents with a chief complaint of STEMI (26 Aug 2020 08:39)      Consult: Yes [   ]  No [ x  ]    Reason for Initial Nutrition Assessment: LOS       PAST MEDICAL & SURGICAL HISTORY:  Hypertension  Hypercholesteremia  Stented coronary artery  S/P appendectomy      Current Nutrition Order:  DASH TLC Diet     PO Intake: Good (%) [   ]  Fair (50-75%) [   ] Poor (<25%) [   ]  Please see Below     GI Issues:   BM8/25+   ND rounded     Pain:  none per flow sheets     Skin Integrity:  No edema   no pressure ulcers; SX site noted     Labs:   08-26    139  |  104  |  18  ----------------------------<  117<H>  4.3   |  24  |  0.84    Ca    9.2      26 Aug 2020 05:53  Phos  3.7     08-26  Mg     2.1     08-26    TPro  7.2  /  Alb  3.5  /  TBili  0.6  /  DBili  x   /  AST  47<H>  /  ALT  78<H>  /  AlkPhos  80  08-26    CAPILLARY BLOOD GLUCOSE        Nutritionally Pertinent Lab Values:  Glucose 117  ALT SGPT 78, AST SGOT  47  A1c 5.4%  CHOL 276 / HDL 34 /      Medications:  MEDICATIONS  (STANDING):  albuterol/ipratropium for Nebulization 3 milliLiter(s) Nebulizer every 6 hours  aspirin  chewable 81 milliGRAM(s) Oral daily  atorvastatin 80 milliGRAM(s) Oral at bedtime  cefTRIAXone   IVPB 1000 milliGRAM(s) IV Intermittent every 24 hours  chlorhexidine 4% Liquid 1 Application(s) Topical <User Schedule>  enoxaparin Injectable 40 milliGRAM(s) SubCutaneous every 24 hours  melatonin 5 milliGRAM(s) Oral at bedtime  metoprolol succinate ER 25 milliGRAM(s) Oral every 24 hours  nicotine - 21 mG/24Hr(s) Patch 1 patch Transdermal daily  ticagrelor 90 milliGRAM(s) Oral every 12 hours  vasopressin Infusion 0.04 Unit(s)/Min (2.4 mL/Hr) IV Continuous <Continuous>    MEDICATIONS  (PRN):  acetaminophen   Tablet .. 650 milliGRAM(s) Oral every 6 hours PRN Temp greater or equal to 38C (100.4F), Moderate Pain (4 - 6)  benzonatate 100 milliGRAM(s) Oral three times a day PRN Coughing  polyethylene glycol 3350 17 Gram(s) Oral daily PRN Constipation      Admitted Anthropometrics:  5'3''  pounds +-10%   8/22 165 pounds  BMI 29.3   %WDV=134    Weight Change:  pounds per pt. No wt changes PTA admitted.     Nutrition Focused Physical Exam: Completed [   ]  Unable to complete [   ]- NA     Estimated energy needs:   IBW used to calculate energy needs due to pt's current body weight exceeding 120% of IBW  Adjusted for age based on current conditions, fluids per team  1875-2250kcal/day 25-30kcal/kg   75gm/day 1.0gm/kg     Subjective:   55 year old male with past medical history significant for CAD (s/p proximal LAD stent in 2018), HLD, 40 pack year smoking history, history of aortic valve stenosis pending aortic valve replacement, presented from his home with left sided chest pain, tightness, diaphoresis, and shortness of breath. He was found to have ST elevations in inferior leads and an elevated trop of 0.88, and was admitted ultimately for STEMI and sent emergently to cath lab. In the cath lab, he was found to have 85% occlusion to his proximal RCA, 75% occlusion to his mid RCA, 50% occlusion in the left main artery, and 75% occlusion to LAD proximal. His OM1 was found to be 100% thrombosed, was determined to be the culprit lesion, and STENT was placed. Pt admitted after the procedure to the CCU for further monitoring. BERKLEY with Noted LVEF 40-45%, CXRs showing pulmonary edema, fluid congestion.   Pt had been pending d/c 8/25 however, became short of breath, diaphoretic and experienced a pressure-like chest pain after straining while using the bathroom. Pt hypotensive, BP dropped to 60s/40s and became bradycardic; CT surgery re-consulted to consider performing AVR while inpatient rather than post d/c due to recent event occurrence.  Spoke with RN/Pt. Pt seen alert in room. 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 Pt reports eating a lot of red meat, aside from that unable to provide extensive recall. No issues chewing/swallowing, NKFA. Pt ordered for DASH TLC diet at this time. Pt reports lack of appetite 2/2 being sick and upset he is not better. Pt reports he has consumed things like yogurt and fruit. Unable to comment further on %PO intake at this time.  Please see below for nutritions recommendations.     Nutrition Diagnosis: Inadequate oral intake RT decreased desire for meals AEB decreased PO intake   Goal: Pt will meet at least 75% of nutrient needs     Recommendations:  1. DASH TLC diet.  2. Monitor %PO intake.   >> If PO intake remains limited, consider to liberalize diet.   3. Monitor Labs: monitor BMP, CBC, glucose, lytes, trend renal indices, POCT.  4. Monitor Skin, GI, Wts, GOC.  5. RD to remain available for additional nutrition interventions as needed.     Education: Encouraged PO intake during meals & reviewed importance. DASH Diet education provided - pt seen with handouts from NCM at bedside, reports was provided by [employer] on [employer]. Understanding stated, provide additional motivation as needed.     Risk Level: High [ X ] Moderate [   ] Low [   ]

## 2020-08-26 NOTE — PROGRESS NOTE ADULT - ASSESSMENT
Assessment: 56 y/o M with CAD, severe AS, smoker, HLD who presented with STEMI s/p STEPHENIE to OM1    STEMI s/p PCI  -c/w ASA 81mg and brilinta  -c/w lipitor  -TTE results seen    Severe AS  -Multi-disciplinary heart team discussion regarding timing based on symptoms  -he is best a candidate for SAVR with concomitant CABG  -Right now on DAPT (brilinta), and if undergoing surgery will be very high risk for bleeding... will discuss with Dr. Mitchell. Possible option to switch to plavix and undergo surgery on ASA/plavix    CHF (systolic heart failure, chronic)  -LVEF 40%  -c/w GDMT    smoker  -nebs PRN for mild COPD    Dispo- heart team discussion regarding ongoing symptoms and timing of treatment

## 2020-08-26 NOTE — PROGRESS NOTE ADULT - SUBJECTIVE AND OBJECTIVE BOX
OVERNIGHT EVENTS: BENJAMIN. VSS, and MAP was within goal.     SUBJECTIVE / INTERVAL HPI: Patient seen and examined at bedside. He feels well, though he says whenever he tries to move his legs, or exert himself even minutely, he becomes short of breath. Aside from that, he denies chest pain, abd pain, fevers.     VITAL SIGNS:  Vital Signs Last 24 Hrs  T(C): 37.1 (26 Aug 2020 06:00), Max: 37.3 (25 Aug 2020 13:00)  T(F): 98.8 (26 Aug 2020 06:00), Max: 99.1 (25 Aug 2020 13:00)  HR: 97 (26 Aug 2020 08:00) (84 - 114)  BP: 108/57 (26 Aug 2020 08:00) (69/50 - 117/56)  BP(mean): 72 (26 Aug 2020 08:00) (55 - 80)  RR: 31 (26 Aug 2020 08:00) (21 - 65)  SpO2: 96% (26 Aug 2020 08:00) (93% - 100%)    PHYSICAL EXAM:    General: NAD, taking frequent shallow breaths.   Neck: supple, no JVD appreciated.   Cardiovascular: +S1/S2; RRR  Respiratory: Bilateral basilar wheezes auscultated. Shallow work of breathing.   Gastrointestinal: soft, NT/ND; +BSx4  Extremities: WWP; no edema, clubbing or cyanosis  Vascular: 2+ radial, DP/PT pulses B/L  Neurological: AAOx3; no focal deficits    MEDICATIONS:  MEDICATIONS  (STANDING):  albuterol/ipratropium for Nebulization 3 milliLiter(s) Nebulizer every 6 hours  aspirin  chewable 81 milliGRAM(s) Oral daily  atorvastatin 80 milliGRAM(s) Oral at bedtime  cefTRIAXone   IVPB 1000 milliGRAM(s) IV Intermittent every 24 hours  chlorhexidine 4% Liquid 1 Application(s) Topical <User Schedule>  enoxaparin Injectable 40 milliGRAM(s) SubCutaneous every 24 hours  melatonin 5 milliGRAM(s) Oral at bedtime  metoprolol succinate ER 25 milliGRAM(s) Oral every 24 hours  nicotine - 21 mG/24Hr(s) Patch 1 patch Transdermal daily  ticagrelor 90 milliGRAM(s) Oral every 12 hours  vasopressin Infusion 0.04 Unit(s)/Min (2.4 mL/Hr) IV Continuous <Continuous>    MEDICATIONS  (PRN):  acetaminophen   Tablet .. 650 milliGRAM(s) Oral every 6 hours PRN Temp greater or equal to 38C (100.4F), Moderate Pain (4 - 6)  benzonatate 100 milliGRAM(s) Oral three times a day PRN Coughing  polyethylene glycol 3350 17 Gram(s) Oral daily PRN Constipation      ALLERGIES:  Allergies    No Known Allergies    Intolerances        LABS:                        12.5   8.68  )-----------( 230      ( 26 Aug 2020 05:53 )             38.5     08-26    139  |  104  |  18  ----------------------------<  117<H>  4.3   |  24  |  0.84    Ca    9.2      26 Aug 2020 05:53  Phos  3.7     08-26  Mg     2.1     08-26    TPro  7.2  /  Alb  3.5  /  TBili  0.6  /  DBili  x   /  AST  47<H>  /  ALT  78<H>  /  AlkPhos  80  08-26    PT/INR - ( 26 Aug 2020 05:53 )   PT: 13.1 sec;   INR: 1.10          PTT - ( 26 Aug 2020 05:53 )  PTT:32.0 sec    CAPILLARY BLOOD GLUCOSE      POCT Blood Glucose.: 194 mg/dL (25 Aug 2020 12:28)      RADIOLOGY & ADDITIONAL TESTS: Reviewed.    ASSESSMENT:    PLAN:

## 2020-08-27 DIAGNOSIS — Z78.9 OTHER SPECIFIED HEALTH STATUS: ICD-10-CM

## 2020-08-27 DIAGNOSIS — F17.200 NICOTINE DEPENDENCE, UNSPECIFIED, UNCOMPLICATED: ICD-10-CM

## 2020-08-27 LAB
ALBUMIN SERPL ELPH-MCNC: 3.6 G/DL — SIGNIFICANT CHANGE UP (ref 3.3–5)
ALP SERPL-CCNC: 82 U/L — SIGNIFICANT CHANGE UP (ref 40–120)
ALT FLD-CCNC: 87 U/L — HIGH (ref 10–45)
ANION GAP SERPL CALC-SCNC: 12 MMOL/L — SIGNIFICANT CHANGE UP (ref 5–17)
AST SERPL-CCNC: 59 U/L — HIGH (ref 10–40)
BASOPHILS # BLD AUTO: 0.06 K/UL — SIGNIFICANT CHANGE UP (ref 0–0.2)
BASOPHILS NFR BLD AUTO: 0.8 % — SIGNIFICANT CHANGE UP (ref 0–2)
BILIRUB SERPL-MCNC: 0.6 MG/DL — SIGNIFICANT CHANGE UP (ref 0.2–1.2)
BUN SERPL-MCNC: 16 MG/DL — SIGNIFICANT CHANGE UP (ref 7–23)
CALCIUM SERPL-MCNC: 9.4 MG/DL — SIGNIFICANT CHANGE UP (ref 8.4–10.5)
CHLORIDE SERPL-SCNC: 98 MMOL/L — SIGNIFICANT CHANGE UP (ref 96–108)
CO2 SERPL-SCNC: 24 MMOL/L — SIGNIFICANT CHANGE UP (ref 22–31)
CREAT SERPL-MCNC: 0.85 MG/DL — SIGNIFICANT CHANGE UP (ref 0.5–1.3)
CULTURE RESULTS: SIGNIFICANT CHANGE UP
CULTURE RESULTS: SIGNIFICANT CHANGE UP
EOSINOPHIL # BLD AUTO: 0.49 K/UL — SIGNIFICANT CHANGE UP (ref 0–0.5)
EOSINOPHIL NFR BLD AUTO: 6.5 % — HIGH (ref 0–6)
GLUCOSE SERPL-MCNC: 108 MG/DL — HIGH (ref 70–99)
HCT VFR BLD CALC: 41.4 % — SIGNIFICANT CHANGE UP (ref 39–50)
HGB BLD-MCNC: 13.5 G/DL — SIGNIFICANT CHANGE UP (ref 13–17)
IMM GRANULOCYTES NFR BLD AUTO: 0.5 % — SIGNIFICANT CHANGE UP (ref 0–1.5)
LYMPHOCYTES # BLD AUTO: 1.44 K/UL — SIGNIFICANT CHANGE UP (ref 1–3.3)
LYMPHOCYTES # BLD AUTO: 19 % — SIGNIFICANT CHANGE UP (ref 13–44)
MAGNESIUM SERPL-MCNC: 2.1 MG/DL — SIGNIFICANT CHANGE UP (ref 1.6–2.6)
MCHC RBC-ENTMCNC: 29.4 PG — SIGNIFICANT CHANGE UP (ref 27–34)
MCHC RBC-ENTMCNC: 32.6 GM/DL — SIGNIFICANT CHANGE UP (ref 32–36)
MCV RBC AUTO: 90.2 FL — SIGNIFICANT CHANGE UP (ref 80–100)
MONOCYTES # BLD AUTO: 0.66 K/UL — SIGNIFICANT CHANGE UP (ref 0–0.9)
MONOCYTES NFR BLD AUTO: 8.7 % — SIGNIFICANT CHANGE UP (ref 2–14)
NEUTROPHILS # BLD AUTO: 4.9 K/UL — SIGNIFICANT CHANGE UP (ref 1.8–7.4)
NEUTROPHILS NFR BLD AUTO: 64.5 % — SIGNIFICANT CHANGE UP (ref 43–77)
NRBC # BLD: 0 /100 WBCS — SIGNIFICANT CHANGE UP (ref 0–0)
PHOSPHATE SERPL-MCNC: 3.8 MG/DL — SIGNIFICANT CHANGE UP (ref 2.5–4.5)
PLATELET # BLD AUTO: 241 K/UL — SIGNIFICANT CHANGE UP (ref 150–400)
POTASSIUM SERPL-MCNC: 4.1 MMOL/L — SIGNIFICANT CHANGE UP (ref 3.5–5.3)
POTASSIUM SERPL-SCNC: 4.1 MMOL/L — SIGNIFICANT CHANGE UP (ref 3.5–5.3)
PROT SERPL-MCNC: 7.5 G/DL — SIGNIFICANT CHANGE UP (ref 6–8.3)
RBC # BLD: 4.59 M/UL — SIGNIFICANT CHANGE UP (ref 4.2–5.8)
RBC # FLD: 12.7 % — SIGNIFICANT CHANGE UP (ref 10.3–14.5)
SODIUM SERPL-SCNC: 134 MMOL/L — LOW (ref 135–145)
SPECIMEN SOURCE: SIGNIFICANT CHANGE UP
SPECIMEN SOURCE: SIGNIFICANT CHANGE UP
WBC # BLD: 7.59 K/UL — SIGNIFICANT CHANGE UP (ref 3.8–10.5)
WBC # FLD AUTO: 7.59 K/UL — SIGNIFICANT CHANGE UP (ref 3.8–10.5)

## 2020-08-27 PROCEDURE — 99291 CRITICAL CARE FIRST HOUR: CPT

## 2020-08-27 PROCEDURE — 71045 X-RAY EXAM CHEST 1 VIEW: CPT | Mod: 26

## 2020-08-27 RX ORDER — SIMVASTATIN 40 MG/1
40 TABLET, FILM COATED ORAL
Qty: 30 | Refills: 0 | Status: ACTIVE | COMMUNITY

## 2020-08-27 RX ORDER — CLOPIDOGREL BISULFATE 75 MG/1
75 TABLET, FILM COATED ORAL DAILY
Refills: 0 | Status: DISCONTINUED | OUTPATIENT
Start: 2020-08-28 | End: 2020-08-31

## 2020-08-27 RX ORDER — FUROSEMIDE 40 MG
20 TABLET ORAL ONCE
Refills: 0 | Status: COMPLETED | OUTPATIENT
Start: 2020-08-27 | End: 2020-08-27

## 2020-08-27 RX ORDER — SIMETHICONE 80 MG/1
80 TABLET, CHEWABLE ORAL DAILY
Refills: 0 | Status: DISCONTINUED | OUTPATIENT
Start: 2020-08-27 | End: 2020-08-31

## 2020-08-27 RX ORDER — CLOPIDOGREL BISULFATE 75 MG/1
300 TABLET, FILM COATED ORAL ONCE
Refills: 0 | Status: COMPLETED | OUTPATIENT
Start: 2020-08-27 | End: 2020-08-27

## 2020-08-27 RX ADMIN — Medication 3 MILLILITER(S): at 09:22

## 2020-08-27 RX ADMIN — Medication 20 MILLIGRAM(S): at 08:38

## 2020-08-27 RX ADMIN — SIMETHICONE 80 MILLIGRAM(S): 80 TABLET, CHEWABLE ORAL at 16:40

## 2020-08-27 RX ADMIN — Medication 81 MILLIGRAM(S): at 12:13

## 2020-08-27 RX ADMIN — TICAGRELOR 90 MILLIGRAM(S): 90 TABLET ORAL at 05:13

## 2020-08-27 RX ADMIN — Medication 3 MILLILITER(S): at 22:55

## 2020-08-27 RX ADMIN — Medication 3 MILLILITER(S): at 15:55

## 2020-08-27 RX ADMIN — CLOPIDOGREL BISULFATE 300 MILLIGRAM(S): 75 TABLET, FILM COATED ORAL at 18:08

## 2020-08-27 RX ADMIN — ATORVASTATIN CALCIUM 80 MILLIGRAM(S): 80 TABLET, FILM COATED ORAL at 22:12

## 2020-08-27 RX ADMIN — Medication 25 MILLIGRAM(S): at 12:13

## 2020-08-27 RX ADMIN — CHLORHEXIDINE GLUCONATE 1 APPLICATION(S): 213 SOLUTION TOPICAL at 05:13

## 2020-08-27 RX ADMIN — ENOXAPARIN SODIUM 40 MILLIGRAM(S): 100 INJECTION SUBCUTANEOUS at 22:12

## 2020-08-27 RX ADMIN — Medication 3 MILLILITER(S): at 05:12

## 2020-08-27 NOTE — PROGRESS NOTE ADULT - ASSESSMENT
Mr. Alexandrea Winter is a 55 year old male with past medical history significant for CAD (s/p proximal LAD stent in 2018), HLD, 40 pack year smoking history, history of aortic valve stenosis pending aortic valve replacement, presented 8/22 AM with ST elevations in inferior leads and an elevated trop of 0.88, now s/p cath and 1 STEPHENIE placed in OM, admitted to CCU for further monitoring and management.     Cardio  #Inferolateral STEMI   He was found to have ST elevations in inferior leads and an elevated trop of 0.88. In the cath lab, he was found to have 85% occlusion to his proximal RCA, 75% occlusion to his mid RCA, 50% occlusion in the left main artery, and 75% occlusion to LAD proximal. His OM1 was found to be 100% thrombosed, was determined to be the culprit lesion, and STENT was placed.   -C/w brillanta 90 mg BID  -C/w aspirin 81 mg daily  -c/w lipitor 80 mg at bedtime  -f/u daily EKGs    #Severe aortic stenosis- pending aortic valve replacement   -f/u with CT surgery     #HLD  -Lipid panel showed cholesterol 276, Direct , HDL: 34  -Continue with lipitor 80 mg daily    #HFrEF:   -Left ventricular systolic function is hjfrfl-sy-mislkcudui reduced with a calculated ejection fraction of 40-45% with regional wall motion abnormalities.   CXRs showing pulmonary edema, fluid congestion worse than yesterday likely secondary to 500 mL fluid bolus  -c/w Metoprolol 25g     Pulm  #Pulmonary Edema  -Discontinued diuresis given tenuous blood pressures  -Monitor respiratory status    #COPD-40 pack year smoking history with chronic cough  -On Duonebs Q6h and benzonatate 100 mg PO TID PRN for coughing  -f/u daily CXR  -nicotine patch transdermal daily   -will have patient follow up with pulm once discharged    Endocrine  -No active issues    Renal  -No active issues    GI  -No active issues    ID  #Community acquired pneumonia: no fevers since 8/23 AM  -last day of CAP treatment today 8/26          Preventative Measures  E: replete as needed  N: DASH/TLC diet   DVT: SCDs, lovenox 40 mg subQ q24h  GI PPX: none Mr. Alexandrea Winter is a 55 year old male with past medical history significant for CAD (s/p proximal LAD stent in 2018), HLD, 40 pack year smoking history, history of aortic valve stenosis pending aortic valve replacement, presented 8/22 AM with ST elevations in inferior leads and an elevated trop of 0.88, now s/p cath and 1 STEPHENIE placed in OM, admitted to CCU for further monitoring and management.     Cardio  #Inferolateral STEMI   He was found to have ST elevations in inferior leads and an elevated trop of 0.88. In the cath lab, he was found to have 85% occlusion to his proximal RCA, 75% occlusion to his mid RCA, 50% occlusion in the left main artery, and 75% occlusion to LAD proximal. His OM1 was found to be 100% thrombosed, was determined to be the culprit lesion, and STENT was placed.   -C/w brillanta 90 mg BID  -C/w aspirin 81 mg daily  -c/w lipitor 80 mg at bedtime  -f/u daily EKGs    #Severe aortic stenosis- pending aortic valve replacement   -f/u with CT surgery     #HLD  -Lipid panel showed cholesterol 276, Direct , HDL: 34  -Continue with lipitor 80 mg daily    #HFrEF:   -Left ventricular systolic function is gkgjlw-uj-gwuidlznuc reduced with a calculated ejection fraction of 40-45% with regional wall motion abnormalities.   CXRs showing pulmonary edema, fluid congestion worse than yesterday likely secondary to 500 mL fluid bolus  -c/w Metoprolol 25g   -s/p Lasix 20mg PO-BP tolerated well    Pulm  #Pulmonary Edema  -Discontinued diuresis given tenuous blood pressures  -Monitor respiratory status    #COPD-40 pack year smoking history with chronic cough  -On Duonebs Q6h and benzonatate 100 mg PO TID PRN for coughing  -f/u daily CXR  -nicotine patch transdermal daily   -will have patient follow up with pulm once discharged    Endocrine  -No active issues    Renal  -No active issues    GI  -No active issues    ID  #Community acquired pneumonia: no fevers since 8/23 AM  -last day of CAP treatment today 8/26          Preventative Measures  E: replete as needed  N: DASH/TLC diet   DVT: SCDs, lovenox 40 mg subQ q24h  GI PPX: none

## 2020-08-27 NOTE — PROGRESS NOTE ADULT - SUBJECTIVE AND OBJECTIVE BOX
OVERNIGHT EVENTS: BENJAMIN. VSS.     SUBJECTIVE / INTERVAL HPI:     VITAL SIGNS:  Vital Signs Last 24 Hrs  T(C): 37.1 (27 Aug 2020 05:03), Max: 37.3 (26 Aug 2020 21:35)  T(F): 98.8 (27 Aug 2020 05:03), Max: 99.1 (26 Aug 2020 21:35)  HR: 87 (27 Aug 2020 07:00) (76 - 103)  BP: 99/61 (27 Aug 2020 07:00) (85/50 - 112/65)  BP(mean): 75 (27 Aug 2020 07:00) (62 - 83)  RR: 29 (27 Aug 2020 07:00) (26 - 38)  SpO2: 95% (27 Aug 2020 07:00) (92% - 100%)    PHYSICAL EXAM:    General: NAD  Neck: No JVD  Cardiovascular: +S1/S2; RRR  Respiratory: Wheezes and mild crackles auscultated b/l lung bases  Gastrointestinal: soft, NT/ND; +BSx4  Extremities: WWP; no edema, clubbing or cyanosis  Vascular: 2+ radial, DP/PT pulses B/L  Neurological: AAOx3; no focal deficits    MEDICATIONS:  MEDICATIONS  (STANDING):  albuterol/ipratropium for Nebulization 3 milliLiter(s) Nebulizer every 6 hours  aspirin  chewable 81 milliGRAM(s) Oral daily  atorvastatin 80 milliGRAM(s) Oral at bedtime  cefTRIAXone   IVPB 1000 milliGRAM(s) IV Intermittent every 24 hours  chlorhexidine 4% Liquid 1 Application(s) Topical <User Schedule>  enoxaparin Injectable 40 milliGRAM(s) SubCutaneous every 24 hours  melatonin 5 milliGRAM(s) Oral at bedtime  metoprolol succinate ER 25 milliGRAM(s) Oral every 24 hours  nicotine - 21 mG/24Hr(s) Patch 1 patch Transdermal daily  ticagrelor 90 milliGRAM(s) Oral every 12 hours    MEDICATIONS  (PRN):  acetaminophen   Tablet .. 650 milliGRAM(s) Oral every 6 hours PRN Temp greater or equal to 38C (100.4F), Moderate Pain (4 - 6)  benzonatate 100 milliGRAM(s) Oral three times a day PRN Coughing  polyethylene glycol 3350 17 Gram(s) Oral daily PRN Constipation      ALLERGIES:  Allergies    No Known Allergies    Intolerances        LABS:                        13.5   7.59  )-----------( 241      ( 27 Aug 2020 05:24 )             41.4     08-27    134<L>  |  98  |  16  ----------------------------<  108<H>  4.1   |  24  |  0.85    Ca    9.4      27 Aug 2020 05:24  Phos  3.8     08-27  Mg     2.1     08-27    TPro  7.5  /  Alb  3.6  /  TBili  0.6  /  DBili  x   /  AST  59<H>  /  ALT  87<H>  /  AlkPhos  82  08-27    PT/INR - ( 26 Aug 2020 05:53 )   PT: 13.1 sec;   INR: 1.10          PTT - ( 26 Aug 2020 05:53 )  PTT:32.0 sec    CAPILLARY BLOOD GLUCOSE      POCT Blood Glucose.: 194 mg/dL (25 Aug 2020 12:28)      RADIOLOGY & ADDITIONAL TESTS: Reviewed.    ASSESSMENT:    PLAN: OVERNIGHT EVENTS: BENJAMIN. VSS.     SUBJECTIVE / INTERVAL HPI: had no physical complaints overnight except for shortness of breath with minimal movement; however, he said he could not sleep all night due to anxiety and stress about his situation. he did not have any interest in speaking to a counselor at this time.     VITAL SIGNS:  Vital Signs Last 24 Hrs  T(C): 37.1 (27 Aug 2020 05:03), Max: 37.3 (26 Aug 2020 21:35)  T(F): 98.8 (27 Aug 2020 05:03), Max: 99.1 (26 Aug 2020 21:35)  HR: 87 (27 Aug 2020 07:00) (76 - 103)  BP: 99/61 (27 Aug 2020 07:00) (85/50 - 112/65)  BP(mean): 75 (27 Aug 2020 07:00) (62 - 83)  RR: 29 (27 Aug 2020 07:00) (26 - 38)  SpO2: 95% (27 Aug 2020 07:00) (92% - 100%)    PHYSICAL EXAM:    General: NAD  Neck: No JVD  Cardiovascular: +S1/S2; RRR  Respiratory: Wheezes and mild crackles auscultated b/l lung bases  Gastrointestinal: soft, NT/ND; +BSx4  Extremities: WWP; no edema, clubbing or cyanosis  Vascular: 2+ radial, DP/PT pulses B/L  Neurological: AAOx3; no focal deficits    MEDICATIONS:  MEDICATIONS  (STANDING):  albuterol/ipratropium for Nebulization 3 milliLiter(s) Nebulizer every 6 hours  aspirin  chewable 81 milliGRAM(s) Oral daily  atorvastatin 80 milliGRAM(s) Oral at bedtime  cefTRIAXone   IVPB 1000 milliGRAM(s) IV Intermittent every 24 hours  chlorhexidine 4% Liquid 1 Application(s) Topical <User Schedule>  enoxaparin Injectable 40 milliGRAM(s) SubCutaneous every 24 hours  melatonin 5 milliGRAM(s) Oral at bedtime  metoprolol succinate ER 25 milliGRAM(s) Oral every 24 hours  nicotine - 21 mG/24Hr(s) Patch 1 patch Transdermal daily  ticagrelor 90 milliGRAM(s) Oral every 12 hours    MEDICATIONS  (PRN):  acetaminophen   Tablet .. 650 milliGRAM(s) Oral every 6 hours PRN Temp greater or equal to 38C (100.4F), Moderate Pain (4 - 6)  benzonatate 100 milliGRAM(s) Oral three times a day PRN Coughing  polyethylene glycol 3350 17 Gram(s) Oral daily PRN Constipation      ALLERGIES:  Allergies    No Known Allergies    Intolerances        LABS:                        13.5   7.59  )-----------( 241      ( 27 Aug 2020 05:24 )             41.4     08-27    134<L>  |  98  |  16  ----------------------------<  108<H>  4.1   |  24  |  0.85    Ca    9.4      27 Aug 2020 05:24  Phos  3.8     08-27  Mg     2.1     08-27    TPro  7.5  /  Alb  3.6  /  TBili  0.6  /  DBili  x   /  AST  59<H>  /  ALT  87<H>  /  AlkPhos  82  08-27    PT/INR - ( 26 Aug 2020 05:53 )   PT: 13.1 sec;   INR: 1.10          PTT - ( 26 Aug 2020 05:53 )  PTT:32.0 sec    CAPILLARY BLOOD GLUCOSE      POCT Blood Glucose.: 194 mg/dL (25 Aug 2020 12:28)      RADIOLOGY & ADDITIONAL TESTS: Reviewed.    ASSESSMENT:    PLAN:

## 2020-08-28 DIAGNOSIS — I21.3 ST ELEVATION (STEMI) MYOCARDIAL INFARCTION OF UNSPECIFIED SITE: ICD-10-CM

## 2020-08-28 DIAGNOSIS — I35.0 NONRHEUMATIC AORTIC (VALVE) STENOSIS: ICD-10-CM

## 2020-08-28 DIAGNOSIS — E78.00 PURE HYPERCHOLESTEROLEMIA, UNSPECIFIED: ICD-10-CM

## 2020-08-28 LAB
ALBUMIN SERPL ELPH-MCNC: 3.5 G/DL — SIGNIFICANT CHANGE UP (ref 3.3–5)
ALP SERPL-CCNC: 79 U/L — SIGNIFICANT CHANGE UP (ref 40–120)
ALT FLD-CCNC: 101 U/L — HIGH (ref 10–45)
ANION GAP SERPL CALC-SCNC: 11 MMOL/L — SIGNIFICANT CHANGE UP (ref 5–17)
AST SERPL-CCNC: 63 U/L — HIGH (ref 10–40)
BASOPHILS # BLD AUTO: 0.06 K/UL — SIGNIFICANT CHANGE UP (ref 0–0.2)
BASOPHILS NFR BLD AUTO: 0.8 % — SIGNIFICANT CHANGE UP (ref 0–2)
BILIRUB SERPL-MCNC: 0.5 MG/DL — SIGNIFICANT CHANGE UP (ref 0.2–1.2)
BUN SERPL-MCNC: 26 MG/DL — HIGH (ref 7–23)
CALCIUM SERPL-MCNC: 9.3 MG/DL — SIGNIFICANT CHANGE UP (ref 8.4–10.5)
CHLORIDE SERPL-SCNC: 102 MMOL/L — SIGNIFICANT CHANGE UP (ref 96–108)
CO2 SERPL-SCNC: 24 MMOL/L — SIGNIFICANT CHANGE UP (ref 22–31)
CREAT SERPL-MCNC: 0.89 MG/DL — SIGNIFICANT CHANGE UP (ref 0.5–1.3)
EOSINOPHIL # BLD AUTO: 0.52 K/UL — HIGH (ref 0–0.5)
EOSINOPHIL NFR BLD AUTO: 7.3 % — HIGH (ref 0–6)
GLUCOSE SERPL-MCNC: 111 MG/DL — HIGH (ref 70–99)
HCT VFR BLD CALC: 38.5 % — LOW (ref 39–50)
HGB BLD-MCNC: 12.9 G/DL — LOW (ref 13–17)
IMM GRANULOCYTES NFR BLD AUTO: 0.8 % — SIGNIFICANT CHANGE UP (ref 0–1.5)
LYMPHOCYTES # BLD AUTO: 1.62 K/UL — SIGNIFICANT CHANGE UP (ref 1–3.3)
LYMPHOCYTES # BLD AUTO: 22.7 % — SIGNIFICANT CHANGE UP (ref 13–44)
MAGNESIUM SERPL-MCNC: 2.2 MG/DL — SIGNIFICANT CHANGE UP (ref 1.6–2.6)
MCHC RBC-ENTMCNC: 29.8 PG — SIGNIFICANT CHANGE UP (ref 27–34)
MCHC RBC-ENTMCNC: 33.5 GM/DL — SIGNIFICANT CHANGE UP (ref 32–36)
MCV RBC AUTO: 88.9 FL — SIGNIFICANT CHANGE UP (ref 80–100)
MONOCYTES # BLD AUTO: 0.69 K/UL — SIGNIFICANT CHANGE UP (ref 0–0.9)
MONOCYTES NFR BLD AUTO: 9.7 % — SIGNIFICANT CHANGE UP (ref 2–14)
NEUTROPHILS # BLD AUTO: 4.2 K/UL — SIGNIFICANT CHANGE UP (ref 1.8–7.4)
NEUTROPHILS NFR BLD AUTO: 58.7 % — SIGNIFICANT CHANGE UP (ref 43–77)
NRBC # BLD: 0 /100 WBCS — SIGNIFICANT CHANGE UP (ref 0–0)
PHOSPHATE SERPL-MCNC: 3.7 MG/DL — SIGNIFICANT CHANGE UP (ref 2.5–4.5)
PLATELET # BLD AUTO: 248 K/UL — SIGNIFICANT CHANGE UP (ref 150–400)
POTASSIUM SERPL-MCNC: 4 MMOL/L — SIGNIFICANT CHANGE UP (ref 3.5–5.3)
POTASSIUM SERPL-SCNC: 4 MMOL/L — SIGNIFICANT CHANGE UP (ref 3.5–5.3)
PROT SERPL-MCNC: 6.9 G/DL — SIGNIFICANT CHANGE UP (ref 6–8.3)
RBC # BLD: 4.33 M/UL — SIGNIFICANT CHANGE UP (ref 4.2–5.8)
RBC # FLD: 12.4 % — SIGNIFICANT CHANGE UP (ref 10.3–14.5)
SODIUM SERPL-SCNC: 137 MMOL/L — SIGNIFICANT CHANGE UP (ref 135–145)
WBC # BLD: 7.15 K/UL — SIGNIFICANT CHANGE UP (ref 3.8–10.5)
WBC # FLD AUTO: 7.15 K/UL — SIGNIFICANT CHANGE UP (ref 3.8–10.5)

## 2020-08-28 PROCEDURE — 71045 X-RAY EXAM CHEST 1 VIEW: CPT | Mod: 26

## 2020-08-28 PROCEDURE — 99233 SBSQ HOSP IP/OBS HIGH 50: CPT

## 2020-08-28 RX ORDER — FUROSEMIDE 40 MG
20 TABLET ORAL EVERY 24 HOURS
Refills: 0 | Status: DISCONTINUED | OUTPATIENT
Start: 2020-08-28 | End: 2020-08-31

## 2020-08-28 RX ADMIN — Medication 3 MILLILITER(S): at 04:37

## 2020-08-28 RX ADMIN — Medication 25 MILLIGRAM(S): at 11:24

## 2020-08-28 RX ADMIN — CLOPIDOGREL BISULFATE 75 MILLIGRAM(S): 75 TABLET, FILM COATED ORAL at 11:25

## 2020-08-28 RX ADMIN — Medication 3 MILLILITER(S): at 23:14

## 2020-08-28 RX ADMIN — Medication 81 MILLIGRAM(S): at 11:24

## 2020-08-28 RX ADMIN — ENOXAPARIN SODIUM 40 MILLIGRAM(S): 100 INJECTION SUBCUTANEOUS at 23:14

## 2020-08-28 RX ADMIN — CHLORHEXIDINE GLUCONATE 1 APPLICATION(S): 213 SOLUTION TOPICAL at 05:59

## 2020-08-28 RX ADMIN — ATORVASTATIN CALCIUM 80 MILLIGRAM(S): 80 TABLET, FILM COATED ORAL at 23:14

## 2020-08-28 RX ADMIN — Medication 5 MILLIGRAM(S): at 23:14

## 2020-08-28 RX ADMIN — Medication 20 MILLIGRAM(S): at 11:24

## 2020-08-28 RX ADMIN — Medication 3 MILLILITER(S): at 11:25

## 2020-08-28 NOTE — PHYSICAL THERAPY INITIAL EVALUATION ADULT - PERTINENT HX OF CURRENT PROBLEM, REHAB EVAL
Pt is 54 yo male who present with ST elevations in inferior leads and an elevated trop of 0.88, now s/p cath and 1 STEPHENIE placed in OM, admitted to CCU for further monitoring and management.  now stable enough for step down from CCU.

## 2020-08-28 NOTE — PHYSICAL THERAPY INITIAL EVALUATION ADULT - CRITERIA FOR SKILLED THERAPEUTIC INTERVENTIONS
therapy frequency/functional limitations in following categories/anticipated discharge recommendation/rehab potential/impairments found/predicted duration of therapy intervention

## 2020-08-28 NOTE — PROGRESS NOTE ADULT - ASSESSMENT
Assesment:  55y M w/     Plan:  Problem 1: Multi vessel CAD  - Pt is s/p STEMI with STEPHENIE to OM1, continue ASA, brillinta, atorvastatin per primary team  - AVR/CABG, but require cessation of brilinta prior to CPB  - monitor symptoms now that culprit lesion fixed    Problem 2: Severe AS  - bicuspid etiology  - will require AVR  - best to 'cool off' and mobilize/discharge patietn prior to bringing him back for AVR/CABG.    Problem 3: Pulmonary edema  - Per primary team note was requiring NC, now on RA  - Continue diuresis per primary team  - euvolemic on exam  - on lasix 20mg    Problem 4: Smoking cessation  - Pt has significant smoking hx  - Counseling on cessation per primary team     I have reviewed clinical labs tests and reports, radiology tests and reports, as well as old patient medical records, and discussed with the refering physician.

## 2020-08-28 NOTE — PROGRESS NOTE ADULT - PROBLEM SELECTOR PLAN 3
- Continue Lipitor 80mg daily at bedtime   - Pt only taking Crestor 40mg daily at bedtime as outpt.    VTE PPX: Lovenox  Dispo: pending clinical progression

## 2020-08-28 NOTE — PROGRESS NOTE ADULT - ATTENDING COMMENTS
Pt is a 56 y/o man c CAD s/p PCI, AS, HLP who p/w ant lateral stemi with severe/critical AS and pulm edema.    afeb, 109/62, HR 97, 100%    ECGL sinus tachy @ 98 bpm Qs II, III, AVF    TTE: EF 45% AIDA 0.6cm2, Mean grad 35mmHg    CXR: pulm edema b/l    Hgb 13  Cr 0.84  Christina 0.88    - pt with SBP 80s  - cont to graciela flores pt c pulm edema  - pt with acute respiratory failure with COPD/pulm edema  - for COPD cont duo nebs  - pt prob for valve rx as outpt  - would consider intervention if not possible to diurese and pt hypotensive  - cont asa/brillanta/lipitor  - b-blocker/ace/arb when more hemodynamically stable
55M, current heavy smoker, fam hx of premature CAD, HLD, known Severe AS and h/o CAD w/ PCI of LAD in 2018, poor historian p/w inferolateral STEMI s/p primary PCI -> admitted to CCU for further mgmt    -CAD - Inferolateral STEMI s/p [facility] w/ STEPHENIE x 1 to OM1(culprit)- Also has RCA - prox 85% occluded, mid 75%; Left main - 50%; LAD prox - 75% ISR; c/w ASA/Brilinta and Lipitor; Will d/w Interventional and CTS plan for revascularization   -sCHF - Acute systolic CHF 2/2 STEMI; currently volume overloaded - Will give cautious diuresis w/ Lasix 20 IV PRN, currently Saturating well on NC 2L; Will start BB w/ euvolemic; Given severe AS and soft BPs, no room for Afterload reduction at this time  -AS - Severe AS, possibly bicuspid AV, heavily calcified. Pt. states he had been working his outpatient Cardiologist and a Surgeon to get set up for what seems like CABG/AVR prior to p/w STEMI. Will consult CTSurg to explore options for AS +/- CABG vs Multivessel PCI; Will avoid afterload reduction and diurese cautiously w/ 20 IV PRN  -Pulm - Heavy Smoking history; Currently w/ likely pulm edema but also w/ new fevers overnight and possible FLORY infiltrate; Will start coverage for CAP - Ceftriaxone and Azithro. Will also start Nebs PRN  -DASH diet  -OOB to chair  -DVT PPx  -Full Code  -Dispo: CCU    Jillian Skaggs MD  CCU Attending
Pt is a 54 y/o man c CAD s/p PCI, AS, HLP who p/w ant lateral stemi with severe/critical AS and pulm edema.    afeb, 103/56, HR 85, 100%    ECGL sinus tachy @ 98 bpm Qs II, III, AVF    TTE: EF 45% AIDA 0.6cm2, Mean grad 35mmHg    CXR: pulm edema b/l worsened    Pt had an episode of hypotension and bradycardia when trying to use the bathroom. Pt had chest pain and diaphoresis. Pt received 500cc fluid bolus.    Hgb 12.5  Cr 0.84  Christina 0.88    Lactate 5.9 --> 1.5  - pt with SBP 80s  - have to prob hold on diruese c BP  - pt with acute respiratory failure with COPD/pulm edema  - for COPD cont duo nebs  - pt prob for valve rx as outpt  - concern with severe as/pulm edema  - lactate is clearing  - will discuss PABV. TAVR, SAVR with CT/structural  - cont asa/brillanta/lipitor  - b-blocker/ace/arb when more hemodynamically stable .
Pt is a 54 y/o man c CAD s/p PCI, AS, HLP who p/w ant lateral stemi with severe/critical AS and pulm edema.    BPs 80s with pulm edema and was considering to give Dobutamine. Unable to diurese until late afternoon bc of hypotension    afeb, 113/72, HR 82, 100%    ECGL sinus tachy @ 98 bpm Qs II, III, AVF    TTE: EF 45% AIDA 0.6cm2, Mean grad 35mmHg    CXR: pulm edema b/l improved    Hgb 13.5  Cr 0.85  Christina 0.88    Lactate 5.9 --> 1.5  - pt with SBP 80s improving  - cont mild diuresis  - pt with acute respiratory failure with COPD/pulm edema  - for COPD cont duo nebs  - concern with severe as/pulm edema  - lactate is cleared  - will discuss PABV. TAVR, SAVR with CT/structural  - Bicuspid so PABV not ideal, [facility] doesn't accommodate TAVR, will discuss SAVR with CT team  - cont b-blocker for now  - switch brillanta --> plavix for possible surgery  - cont asa/lipitor  - ace/arb when more hemodynamically stable
Pt is a 56 y/o man c CAD s/p PCI, AS, HLP who p/w ant lateral stemi with severe/critical AS and pulm edema.    BPs 80s with pulm edema and was considering to give Dobutamine. Unable to diurese until late afternoon bc of hypotension yesterday. BP more stable now    afeb, 136/62, HR 88, 99%    ECGL sinus tachy @ 98 bpm Qs II, III, AVF    TTE: EF 45% AIDA 0.6cm2, Mean grad 35mmHg    CXR: pulm edema b/l improved    Hgb 12.9  Cr 0.89  Christina 0.88    Lactate 5.9 --> 1.5  - pt with SBP 80s, SBP improved  - cont mild diuresis  - pt with acute respiratory failure with COPD/pulm edema  - for COPD cont duo nebs  - concern with severe as/pulm edema  - lactate cleared  - will discuss PABV. TAVR, SAVR with CT/structural  - Bicuspid so PABV not ideal, [facility] doesn't accommodate TAVR, will discuss SAVR with CT team  - pt for SAVR, but if he improves will d/c and return for SAVR as he is post stemi  - if pt continues to be dyspneic/pulm edema, will consider urgent SAVR  - cont b-blocker for now  - switched brillanta --> plavix for possible surgery  - cont asa/lipitor  - ace/arb when more hemodynamically stable .

## 2020-08-28 NOTE — PROGRESS NOTE ADULT - PROBLEM SELECTOR PLAN 2
- bicuspid etiology, AIDA 0.6.   - will require surgical AVR in the near future  - As per Dr Landers best to 'cool off' and mobilize/discharge patient prior to bringing him back for AVR/CABG.

## 2020-08-28 NOTE — PROGRESS NOTE ADULT - ASSESSMENT
Mr. Alexandrea Winter is a 55 year old male, current 40 pack year smoker, with PMHx HLD, CAD (s/p proximal LAD stent in 2018), severe AS (bicuspid valve) undergoing outpt workup for aortic valve replacement, presented Saturday 8/22/2020 morning from his home with left sided chest pain, tightness, diaphoresis, and shortness of breath. He was found to have ST elevations in inferior leads and an elevated trop of 0.88, and was admitted ultimately for STEMI and sent emergently to cath lab. In the cath lab, he was found to have 85% occlusion to his proximal RCA, 75% occlusion to his mid RCA, 50% occlusion in the left main artery, and 75% occlusion to LAD proximal. His OM1 was found to be 100% thrombosed, was determined to be the culprit lesion, and STENT was placed. He was admitted after the procedure to the CCU for further monitoring. He was placed on Aspirin 81, Brillanta 90mg BID, Lipitor 80 mg, and two doses of Lasix 10 mg IV. He also was given Duo Nebs for his presumed COPD. He also was febrile during his first day of admission, with source presumed to be left lower lobe pneumonia, for which he was given ceftriaxone and azithromycin, both of which will end on 8/26. On day 3 of admission, patient had a vasovagal-like event: he went to use the bathroom where he strained and once he got back to his bedside, he immediately became short of breath, diaphoretic, and was experiencing a pressure-like chest pain that left him unable to speak in complete sentences. He was assessed at bedside, his blood pressure dropped to 60s/40s and he became bradycardic to the 40s. Hewas given two fluid boluses of 250mL which resulted in resolution of his chest pain and shortness of breath, and a return to baseline hemodynamic status. Thoughout the course of his hospital stay he was evaluated by the Cardiothoracic team for aortic valve replacement surgery and CABG to address the remainder of his occluded vessels; however, ultimately it was decided to pursue this outpatient, as per CT team recommendations. Mr. Alexandrea Winter is a 55 year old male, current 40 pack year smoker, with PMHx HLD, CAD (s/p proximal LAD stent in 2018), severe AS (bicuspid valve) undergoing outpt workup for aortic valve replacement, presented Saturday 8/22/2020 morning from his home with left sided chest pain, tightness, diaphoresis, and shortness of breath. Pt ruled in STEMI and went to cardiac cath on 8/22/2020 with STEPHENIE OM1 and residual LM 50%, pRCA 85%, mRCA 75%. D1/LCX mild luminal irregularities. Right radial site stable. Pt confirmed to have severe AS, AIDA 0.6, with a bicuspid aortic valve and CTS consulted for surgical AVR and possible CABG for remaining disease, timing to be determined by CTS Dr Landers. Dr Landers hopes pt will be stable enough for d/c and come for outpt surgery in a few weeks. Pt had LLL lobe pneumonia treated with Ceftriaxone and Azithromycin. On 08/25/2020 pt had vasovagal episode with SBPs 60s/40s and HR 40s requiring multiple fluid boluses. Pt then required Lasix for acute systolic CHF exacerbation (EF 40% from Echo on 8/26/2020. Pt was stepped down from CCU on 08/28/2020 and will be maintained over the weekend to determine if pt will remain inpatient or be discharged and return for outpt surgery.

## 2020-08-28 NOTE — PHYSICAL THERAPY INITIAL EVALUATION ADULT - ADDITIONAL COMMENTS
Pt states he lives alone with 3 flights of stairs. was independent PTA. Pt states after being d/c he has another apartment with no stairs that he will go to.

## 2020-08-28 NOTE — PHYSICAL THERAPY INITIAL EVALUATION ADULT - GENERAL OBSERVATIONS, REHAB EVAL
Pt received supine +EKG +hep lock. PT received consent to treat from CHEY Miller. Pt was left in room 5603 as per nurse Georgia HOOD in chair. RN aware.. Pt received supine +EKG +hep lock. PT received consent to treat from CHEY Miller. Pt was left in room 5603 as per nurse jesus HOOD in chair. RN aware..

## 2020-08-28 NOTE — PROGRESS NOTE ADULT - PROBLEM SELECTOR PLAN 1
- Presented with STEMI and underwent cardiac cath 8/22/2020 STEPHENIE OM1 (100% thrombosed). Residual LM 50%. pLAD 75%, patent mLAD stent. D1/LCx mild luminal irregularities. prox RCA 85%. mRCA 75%.   - CTS Dr Landers consulted and Brilinta 90mg BID was switched to Plavix 75mg daily  on 8/27/2020.   - Continue Lipitor 80mg daily at bedtime.   -f/u daily EKGs

## 2020-08-28 NOTE — PROGRESS NOTE ADULT - SUBJECTIVE AND OBJECTIVE BOX
Interventional Cardiology PA Adult Progress Note    Subjective Assessment:  	  MEDICATIONS:  furosemide    Tablet 20 milliGRAM(s) Oral every 24 hours  metoprolol succinate ER 25 milliGRAM(s) Oral every 24 hours      albuterol/ipratropium for Nebulization 3 milliLiter(s) Nebulizer every 6 hours  benzonatate 100 milliGRAM(s) Oral three times a day PRN    acetaminophen   Tablet .. 650 milliGRAM(s) Oral every 6 hours PRN  melatonin 5 milliGRAM(s) Oral at bedtime    polyethylene glycol 3350 17 Gram(s) Oral daily PRN  simethicone 80 milliGRAM(s) Chew daily PRN    atorvastatin 80 milliGRAM(s) Oral at bedtime    aspirin  chewable 81 milliGRAM(s) Oral daily  chlorhexidine 4% Liquid 1 Application(s) Topical <User Schedule>  clopidogrel Tablet 75 milliGRAM(s) Oral daily  enoxaparin Injectable 40 milliGRAM(s) SubCutaneous every 24 hours      	    [PHYSICAL EXAM:  TELEMETRY:  T(C): 36.6 (08-28-20 @ 13:54), Max: 37.2 (08-28-20 @ 06:38)  HR: 70 (08-28-20 @ 15:48) (68 - 91)  BP: 94/59 (08-28-20 @ 15:48) (85/46 - 139/73)  RR: 22 (08-28-20 @ 15:48) (22 - 32)  SpO2: 97% (08-28-20 @ 15:48) (93% - 99%)  Wt(kg): --  I&O's Summary    27 Aug 2020 07:01  -  28 Aug 2020 07:00  --------------------------------------------------------  IN: 500 mL / OUT: 1130 mL / NET: -630 mL    28 Aug 2020 07:01  -  28 Aug 2020 16:33  --------------------------------------------------------  IN: 417 mL / OUT: 220 mL / NET: 197 mL        Benitez:  Central/PICC/Mid Line:                                         Appearance: Normal	  HEENT:   Normal oral mucosa, PERRL, EOMI	  Neck: Supple, + JVD/ - JVD; Carotid Bruit   Cardiovascular: Normal S1 S2, No JVD, No murmurs,   Respiratory: Lungs clear to auscultation/Decreased Breath Sounds/No Rales, Rhonchi, Wheezing	  Gastrointestinal:  Soft, Non-tender, + BS	  Skin: No rashes, No ecchymoses, No cyanosis  Extremities: Normal range of motion, No clubbing, cyanosis or edema  Vascular: Peripheral pulses palpable 2+ bilaterally  Neurologic: Non-focal  Psychiatry: A & O x 3, Mood & affect appropriate      	    ECG:  	  RADIOLOGY:   DIAGNOSTIC TESTING:  [ ] Echocardiogram:  [ ]  Catheterization:  [ ] Stress Test:    [ ] BERKLEY  OTHER: 	    LABS:	 	  CARDIAC MARKERS:                                  12.9   7.15  )-----------( 248      ( 28 Aug 2020 03:48 )             38.5     08-28    137  |  102  |  26<H>  ----------------------------<  111<H>  4.0   |  24  |  0.89    Ca    9.3      28 Aug 2020 03:48  Phos  3.7     08-28  Mg     2.2     08-28    TPro  6.9  /  Alb  3.5  /  TBili  0.5  /  DBili  x   /  AST  63<H>  /  ALT  101<H>  /  AlkPhos  79  08-28    ASSESSMENT/PLAN: Interventional Cardiology PA Adult Progress Note    CC: chest pain  Subjective Assessment:    Currently asymptomatic    ROS otherwise negative except as stated in HPI and subjective  MEDICATIONS:  furosemide    Tablet 20 milliGRAM(s) Oral every 24 hours  metoprolol succinate ER 25 milliGRAM(s) Oral every 24 hours  albuterol/ipratropium for Nebulization 3 milliLiter(s) Nebulizer every 6 hours  benzonatate 100 milliGRAM(s) Oral three times a day PRN  acetaminophen   Tablet .. 650 milliGRAM(s) Oral every 6 hours PRN  melatonin 5 milliGRAM(s) Oral at bedtime  polyethylene glycol 3350 17 Gram(s) Oral daily PRN  simethicone 80 milliGRAM(s) Chew daily PRN  atorvastatin 80 milliGRAM(s) Oral at bedtime  aspirin  chewable 81 milliGRAM(s) Oral daily  chlorhexidine 4% Liquid 1 Application(s) Topical <User Schedule>  clopidogrel Tablet 75 milliGRAM(s) Oral daily  enoxaparin Injectable 40 milliGRAM(s) SubCutaneous every 24 hours    [PHYSICAL EXAM:  TELEMETRY:   T(C): 36.6 (08-28-20 @ 13:54), Max: 37.2 (08-28-20 @ 06:38)  HR: 70 (08-28-20 @ 15:48) (68 - 91)  BP: 94/59 (08-28-20 @ 15:48) (85/46 - 139/73)  RR: 22 (08-28-20 @ 15:48) (22 - 32)  SpO2: 97% (08-28-20 @ 15:48) (93% - 99%)  Wt(kg): --  I&O's Summary    27 Aug 2020 07:01  -  28 Aug 2020 07:00  --------------------------------------------------------  IN: 500 mL / OUT: 1130 mL / NET: -630 mL    28 Aug 2020 07:01  -  28 Aug 2020 16:33  --------------------------------------------------------  IN: 417 mL / OUT: 220 mL / NET: 197 mL    Benitez: none  Central/PICC/Mid Line: none                                        Appearance: Normal	  HEENT:   Normal oral mucosa, PERRL, EOMI	  Neck: Supple  Cardiovascular: Normal S1 S2, No JVD, No murmurs,   Respiratory: Lungs clear to auscultation b/l, No Rales, Rhonchi, Wheezing	  Gastrointestinal:  Soft, Non-tender, + BS	  Skin: No rashes, No ecchymoses, No cyanosis  Extremities: Normal range of motion, No clubbing, cyanosis or edema  Vascular: Peripheral pulses palpable 2+ bilaterally  Neurologic: Non-focal  Psychiatry: A & O x 3, Mood & affect appropriate    LABS:	 	                       12.9   7.15  )-----------( 248      ( 28 Aug 2020 03:48 )             38.5     08-28    137  |  102  |  26<H>  ----------------------------<  111<H>  4.0   |  24  |  0.89    Ca    9.3      28 Aug 2020 03:48  Phos  3.7     08-28  Mg     2.2     08-28    TPro  6.9  /  Alb  3.5  /  TBili  0.5  /  DBili  x   /  AST  63<H>  /  ALT  101<H>  /  AlkPhos  79  08-28    ASSESSMENT/PLAN:

## 2020-08-28 NOTE — PROGRESS NOTE ADULT - SUBJECTIVE AND OBJECTIVE BOX
OVERNIGHT EVENTS: BENJAMIN.     SUBJECTIVE / INTERVAL HPI: Patient seen and examined at bedside. He had no complaints this morning, denied shortness of breath, chest pain, leg swelling. Feels like he wants to get out of bed and walk, but is slightly anxious to do so given hypotensive episode earlier this week.     VITAL SIGNS:  Vital Signs Last 24 Hrs  T(C): 37.2 (28 Aug 2020 06:38), Max: 37.2 (28 Aug 2020 06:38)  T(F): 98.9 (28 Aug 2020 06:38), Max: 98.9 (28 Aug 2020 06:38)  HR: 75 (28 Aug 2020 06:00) (74 - 94)  BP: 107/56 (28 Aug 2020 06:00) (85/46 - 113/65)  BP(mean): 75 (28 Aug 2020 06:00) (60 - 85)  RR: 23 (28 Aug 2020 06:00) (20 - 35)  SpO2: 96% (28 Aug 2020 06:00) (93% - 98%)    PHYSICAL EXAM:    General: NAD  Neck: supple, no JVD  Cardiovascular: +S1/S2; RRR  Respiratory: bibasilar crackles and wheezes noted at lung base.   Gastrointestinal: soft, NT/ND; +BSx4  Extremities: WWP; no edema, clubbing or cyanosis  Vascular: 2+ radial, DP/PT pulses B/L      MEDICATIONS:  MEDICATIONS  (STANDING):  albuterol/ipratropium for Nebulization 3 milliLiter(s) Nebulizer every 6 hours  aspirin  chewable 81 milliGRAM(s) Oral daily  atorvastatin 80 milliGRAM(s) Oral at bedtime  chlorhexidine 4% Liquid 1 Application(s) Topical <User Schedule>  clopidogrel Tablet 75 milliGRAM(s) Oral daily  enoxaparin Injectable 40 milliGRAM(s) SubCutaneous every 24 hours  furosemide    Tablet 20 milliGRAM(s) Oral every 24 hours  melatonin 5 milliGRAM(s) Oral at bedtime  metoprolol succinate ER 25 milliGRAM(s) Oral every 24 hours  nicotine - 21 mG/24Hr(s) Patch 1 patch Transdermal daily    MEDICATIONS  (PRN):  acetaminophen   Tablet .. 650 milliGRAM(s) Oral every 6 hours PRN Temp greater or equal to 38C (100.4F), Moderate Pain (4 - 6)  benzonatate 100 milliGRAM(s) Oral three times a day PRN Coughing  polyethylene glycol 3350 17 Gram(s) Oral daily PRN Constipation  simethicone 80 milliGRAM(s) Chew daily PRN Gas      ALLERGIES:  Allergies    No Known Allergies    Intolerances        LABS:                        12.9   7.15  )-----------( 248      ( 28 Aug 2020 03:48 )             38.5     08-28    137  |  102  |  26<H>  ----------------------------<  111<H>  4.0   |  24  |  0.89    Ca    9.3      28 Aug 2020 03:48  Phos  3.7     08-28  Mg     2.2     08-28    TPro  6.9  /  Alb  3.5  /  TBili  0.5  /  DBili  x   /  AST  63<H>  /  ALT  101<H>  /  AlkPhos  79  08-28        CAPILLARY BLOOD GLUCOSE          RADIOLOGY & ADDITIONAL TESTS: Reviewed.    ASSESSMENT:    PLAN: Transfer Note: CCU > 5URIS  Mr. Alexandrea Winter is a 55 year old male with past medical history significant for CAD (s/p proximal LAD stent in 2018), Aortic Stenosis, HLD, 40 pack year smoking history, history of aortic valve stenosis pending aortic valve replacement, presented Saturday 8/22 morning from his home with left sided chest pain, tightness, diaphoresis, and shortness of breath. He was found to have ST elevations in inferior leads and an elevated trop of 0.88, and was admitted ultimately for STEMI and sent emergently to cath lab. In the cath lab, he was found to have 85% occlusion to his proximal RCA, 75% occlusion to his mid RCA, 50% occlusion in the left main artery, and 75% occlusion to LAD proximal. His OM1 was found to be 100% thrombosed, was determined to be the culprit lesion, and STENT was placed. He was admitted after the procedure to the CCU for further monitoring. He was placed on Aspirin 81, Brillanta 90mg BID, Lipitor 80 mg, and two doses of Lasix 10 mg IV. He also was given Duo Nebs for his presumed COPD. He also was febrile during his first day of admission, with source presumed to be left lower lobe pneumonia, for which he was given ceftriaxone and azithromycin, both of which will end on 8/26. On day 3 of admission, patient had a vasovagal-like event: he went to use the bathroom where he strained and once he got back to his bedside, he immediately became short of breath, diaphoretic, and was experiencing a pressure-like chest pain that left him unable to speak in complete sentences. He was assessed at bedside, his blood pressure dropped to 60s/40s and he became bradycardic to the 40s. Hewas given two fluid boluses of 250mL which resulted in resolution of his chest pain and shortness of breath, and a return to baseline hemodynamic status. Thoughout the course of his hospital stay he was evaluated by the Cardiothoracic team for aortic valve replacement surgery and CABG to address the remainder of his occluded vessels; however, ultimately it was decided to pursue this outpatient, as per CT team recommendations.    OVERNIGHT EVENTS: BENJAMIN.     SUBJECTIVE / INTERVAL HPI: Patient seen and examined at bedside. He had no complaints this morning, denied shortness of breath, chest pain, leg swelling.     VITAL SIGNS:  Vital Signs Last 24 Hrs  T(C): 37.2 (28 Aug 2020 06:38), Max: 37.2 (28 Aug 2020 06:38)  T(F): 98.9 (28 Aug 2020 06:38), Max: 98.9 (28 Aug 2020 06:38)  HR: 75 (28 Aug 2020 06:00) (74 - 94)  BP: 107/56 (28 Aug 2020 06:00) (85/46 - 113/65)  BP(mean): 75 (28 Aug 2020 06:00) (60 - 85)  RR: 23 (28 Aug 2020 06:00) (20 - 35)  SpO2: 96% (28 Aug 2020 06:00) (93% - 98%)    PHYSICAL EXAM:    General: NAD  Neck: supple, no JVD  Cardiovascular: +S1/S2; RRR  Respiratory: bibasilar crackles and wheezes noted at lung base.   Gastrointestinal: soft, NT/ND; +BSx4  Extremities: WWP; no edema, clubbing or cyanosis  Vascular: 2+ radial, DP/PT pulses B/L      MEDICATIONS:  MEDICATIONS  (STANDING):  albuterol/ipratropium for Nebulization 3 milliLiter(s) Nebulizer every 6 hours  aspirin  chewable 81 milliGRAM(s) Oral daily  atorvastatin 80 milliGRAM(s) Oral at bedtime  chlorhexidine 4% Liquid 1 Application(s) Topical <User Schedule>  clopidogrel Tablet 75 milliGRAM(s) Oral daily  enoxaparin Injectable 40 milliGRAM(s) SubCutaneous every 24 hours  furosemide    Tablet 20 milliGRAM(s) Oral every 24 hours  melatonin 5 milliGRAM(s) Oral at bedtime  metoprolol succinate ER 25 milliGRAM(s) Oral every 24 hours  nicotine - 21 mG/24Hr(s) Patch 1 patch Transdermal daily    MEDICATIONS  (PRN):  acetaminophen   Tablet .. 650 milliGRAM(s) Oral every 6 hours PRN Temp greater or equal to 38C (100.4F), Moderate Pain (4 - 6)  benzonatate 100 milliGRAM(s) Oral three times a day PRN Coughing  polyethylene glycol 3350 17 Gram(s) Oral daily PRN Constipation  simethicone 80 milliGRAM(s) Chew daily PRN Gas      ALLERGIES:  Allergies    No Known Allergies    Intolerances        LABS:                        12.9   7.15  )-----------( 248      ( 28 Aug 2020 03:48 )             38.5     08-28    137  |  102  |  26<H>  ----------------------------<  111<H>  4.0   |  24  |  0.89    Ca    9.3      28 Aug 2020 03:48  Phos  3.7     08-28  Mg     2.2     08-28    TPro  6.9  /  Alb  3.5  /  TBili  0.5  /  DBili  x   /  AST  63<H>  /  ALT  101<H>  /  AlkPhos  79  08-28        CAPILLARY BLOOD GLUCOSE          RADIOLOGY & ADDITIONAL TESTS: Reviewed.    ASSESSMENT:    PLAN:

## 2020-08-28 NOTE — PROGRESS NOTE ADULT - SUBJECTIVE AND OBJECTIVE BOX
Surgeon: Dr. Landers     Requesting Physician: Dr. Skaggs    HISTORY OF PRESENT ILLNESS:  55y Male with a PMHx of CAD (s/p PCI to prox LA in 2018), HLD, current smoker (40 PY smoking hx), and AS who presented to [facility] ED on 8/22 with left sided chest pain, tightness, diaphoresis. He was found to have ST elevation in inferior leads and a troponin of 0.88. He was admitted under cardiology for a STEMI and was emergently brought to cath lab where he was found to have 85% occlusion to his proximal RCA, 75% occlusion to his mid RCA, 50% occlusion in the left main artery, 75% occlusion to LAD proximal, and OM1 100% thrombosed. A STEPHENIE was placed to OM1. An echo was obtained showing severe AS. Given patient's multi vessel CAD and AS CT surgery (Dr. Mitchell) was consulted for surgical evaluation.  Patient denies any CP/SOB. No events as described from several days prior.    PAST MEDICAL & SURGICAL HISTORY:  Hypertension  Hypercholesteremia  Stented coronary artery  S/P appendectomy      MEDICATIONS  (STANDING):  albuterol/ipratropium for Nebulization 3 milliLiter(s) Nebulizer every 6 hours  aspirin  chewable 81 milliGRAM(s) Oral daily  atorvastatin 80 milliGRAM(s) Oral at bedtime  azithromycin  IVPB 500 milliGRAM(s) IV Intermittent every 24 hours  cefTRIAXone   IVPB 1000 milliGRAM(s) IV Intermittent every 24 hours  chlorhexidine 4% Liquid 1 Application(s) Topical <User Schedule>  enoxaparin Injectable 40 milliGRAM(s) SubCutaneous every 24 hours  melatonin 5 milliGRAM(s) Oral at bedtime  nicotine - 21 mG/24Hr(s) Patch 1 patch Transdermal daily  ticagrelor 90 milliGRAM(s) Oral every 12 hours    MEDICATIONS  (PRN):  acetaminophen   Tablet .. 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  Assisted device use (Cane / Walker): No  Live with: Self    FAMILY HISTORY:  FH: hyperlipidemia  FH: coronary artery disease      Review of Systems:  CONSTITUTIONAL: Denies fevers / chills, sweats, fatigue, weight loss, weight gain                                       NEURO:  Denies parathesias, seizures, syncope, confusion                                                                                  EYES:  Denies blurry vision, discharge, pain, loss of vision                                                                                    ENMT:  Denies difficulty hearing, vertigo, dysphagia, epistaxis, recent dental work                                       CV:  Endorses some MANCIA. Denies chest pain, palpitations, orthopnea                                                                                           RESPIRATORY:  Endorses SOB. Denies wheezing, cough / sputum, hemoptysis                                                               GI:  Denies nausea, vomiting, diarrhea, constipation, melena                                                                          : Denies hematuria, dysuria, urgency, incontinence                                                                                          MUSKULOSKELETAL:  Denies arthritis, joint swelling, muscle weakness                                                             SKIN/BREAST:  Denies rash, itching, hair loss, masses                                                                                              PSYCH:  Denies depression, anxiety, suicidal ideation                                                                                                HEME/LYMPH:  Denies bruises easily, enlarged lymph nodes, tender lymph nodes                                          ENDOCRINE:  Denies cold intolerance, heat intolerance, polydipsia                                                                        Physical Exam  CONSTITUTIONAL: Well appearing in NAD assessed laying comfortably in bed   NEURO: A&OX3. No focal deficits noted, moving bilateral upper and lower extremities                   EYES: PERRLA  ENMT: Neck supple  CV: +ROYCE, RRR  RESPIRATORY: Clear to auscultation bilateral anterior lung fields, no wheezes, rales, rhonchi   GI:  +BS, NT/ND  : No ritchie  MUSKULOSKELETAL: No peripheral edema or calf tenderness. Full strength and ROM bilateral upper and lower extremities   SKIN / BREAST: no incisions or rashes       Vital Signs Last 24 Hrs  T(C): 36.9 (28 Aug 2020 09:04), Max: 37.2 (28 Aug 2020 06:38)  T(F): 98.4 (28 Aug 2020 09:04), Max: 98.9 (28 Aug 2020 06:38)  HR: 82 (28 Aug 2020 11:18) (70 - 91)  BP: 117/69 (28 Aug 2020 11:18) (85/46 - 139/73)  BP(mean): 88 (28 Aug 2020 11:18) (60 - 91)  RR: 28 (28 Aug 2020 11:18) (20 - 35)  SpO2: 97% (28 Aug 2020 11:18) (93% - 98%)  Vital Signs Last 24 Hrs  T(C): 36.9 (28 Aug 2020 09:04), Max: 37.2 (28 Aug 2020 06:38)  T(F): 98.4 (28 Aug 2020 09:04), Max: 98.9 (28 Aug 2020 06:38)  HR: 82 (28 Aug 2020 11:18) (70 - 91)  BP: 117/69 (28 Aug 2020 11:18) (85/46 - 139/73)  BP(mean): 88 (28 Aug 2020 11:18) (60 - 91)  RR: 28 (28 Aug 2020 11:18) (20 - 35)  SpO2: 97% (28 Aug 2020 11:18) (93% - 98%)   I&O's Detail    27 Aug 2020 07:01  -  28 Aug 2020 07:00  --------------------------------------------------------  IN:    Oral Fluid: 500 mL  Total IN: 500 mL    OUT:    Voided: 1130 mL  Total OUT: 1130 mL    Total NET: -630 mL      28 Aug 2020 07:01  -  28 Aug 2020 11:50  --------------------------------------------------------  IN:    Oral Fluid: 180 mL  Total IN: 180 mL    OUT:    Voided: 220 mL  Total OUT: 220 mL    Total NET: -40 mL        Daily     Daily       MEDICATIONS  (STANDING):  albuterol/ipratropium for Nebulization 3 milliLiter(s) Nebulizer every 6 hours  aspirin  chewable 81 milliGRAM(s) Oral daily  atorvastatin 80 milliGRAM(s) Oral at bedtime  chlorhexidine 4% Liquid 1 Application(s) Topical <User Schedule>  clopidogrel Tablet 75 milliGRAM(s) Oral daily  enoxaparin Injectable 40 milliGRAM(s) SubCutaneous every 24 hours  furosemide    Tablet 20 milliGRAM(s) Oral every 24 hours  melatonin 5 milliGRAM(s) Oral at bedtime  metoprolol succinate ER 25 milliGRAM(s) Oral every 24 hours  nicotine - 21 mG/24Hr(s) Patch 1 patch Transdermal daily      LABS:                        12.9   7.15  )-----------( 248      ( 28 Aug 2020 03:48 )             38.5     08-28    137  |  102  |  26<H>  ----------------------------<  111<H>  4.0   |  24  |  0.89    Ca    9.3      28 Aug 2020 03:48  Phos  3.7     08-28  Mg     2.2     08-28    TPro  6.9  /  Alb  3.5  /  TBili  0.5  /  DBili  x   /  AST  63<H>  /  ALT  101<H>  /  AlkPhos  79  08-28                    RADIOLOGY & ADDITIONAL STUDIES:  CAROTID U/S: PENDING    CXR:  < from: Xray Chest 1 View- PORTABLE-Routine (08.23.20 @ 05:35) >  INTERPRETATION:  Clinical History: Chest pain    Frontal examination of the chest demonstrates cardiomegaly. Congestion and/or infiltrates. No significant change lung pathology noted in comparison to prior examination of the chest 8/22/2020 Visualized osseous structures are within normal limits.    IMPRESSION: Congestion and/or infiltrates    EKG:  IN CHART    TTE / BERKLEY:  < from: TTE Echo Complete w/o Contrast w/ Doppler (08.22.20 @ 09:48) >  CONCLUSIONS:     1. The aortic valve is calcified (morphology not well seen). There is severe aortic stenosis. The peak transvalvular velocity is 3.52 m/s, the mean transvalvular gradient is 35 mmHg, and the LVOT/AV velocity ratio is 0.19. The peak transaortic gradient is 51 mmHg. The aortic valve area (estimated via the continuity method) is 0.6 cm². There is no evidence of aortic regurgitation.   2. Mild mitral regurgitation.   3. The right ventricle is normal in size. Right ventricular systolic function is normal.   4. There is mild concentric left ventricular hypertrophy. Left ventricular systolic function is [id number]-[id number] reduced with a calculated ejection fraction of 40-45% with regional wall motion abnormalities. There is severe hypokinesis of mid inferolateral, mid anterolateral and basal inferior wall.   5. No pericardial effusion.    < end of copied text >      Cardiac Cath:

## 2020-08-28 NOTE — PROGRESS NOTE ADULT - ASSESSMENT
Mr. Alexandrea Winetr is a 55 year old male with past medical history significant for CAD (s/p proximal LAD stent in 2018), HLD, 40 pack year smoking history, history of aortic valve stenosis pending aortic valve replacement, presented 8/22 AM with ST elevations in inferior leads and an elevated trop of 0.88, now s/p cath and 1 STEPHENIE placed in OM, admitted to CCU for further monitoring and management.     Cardio  #Inferolateral STEMI   He was found to have ST elevations in inferior leads and an elevated trop of 0.88. In the cath lab, he was found to have 85% occlusion to his proximal RCA, 75% occlusion to his mid RCA, 50% occlusion in the left main artery, and 75% occlusion to LAD proximal. His OM1 was found to be 100% thrombosed, was determined to be the culprit lesion, and STENT was placed. As per CTS, switched patient from Brillanta 90 mg to Plavix 75mg in preparation for upcoming procedure.   -C/w Plavix 75 mg  -C/w aspirin 81 mg daily  -c/w lipitor 80 mg at bedtime  -f/u daily EKGs    #Severe aortic stenosis- pending aortic valve replacement   -f/u with CT surgery     #HLD  -Lipid panel showed cholesterol 276, Direct , HDL: 34  -Continue with lipitor 80 mg daily    #HFrEF:   -Left ventricular systolic function is fzcheu-hm-onkwuvdpje reduced with a calculated ejection fraction of 40-45% with regional wall motion abnormalities.   CXRs showing pulmonary edema, fluid congestion worse than yesterday likely secondary to 500 mL fluid bolus  -c/w Metoprolol 25g   -s/p Lasix 20mg PO-BP tolerated well, will continue 20mg PO.     Pulm  #Pulmonary Edema  -Discontinued diuresis given tenuous blood pressures  -Monitor respiratory status    #COPD-40 pack year smoking history with chronic cough  -On Duonebs Q6h and benzonatate 100 mg PO TID PRN for coughing  -f/u daily CXR  -nicotine patch transdermal daily   -will have patient follow up with pulm once discharged    Endocrine  -No active issues    Renal  -No active issues    GI  -No active issues    ID  #Community acquired pneumonia: no fevers since 8/23 AM  -last day of CAP treatment today 8/26          Preventative Measures  E: replete as needed  N: DASH/TLC diet   DVT: SCDs, lovenox 40 mg subQ q24h  GI PPX: none Mr. Alexandrea Winter is a 55 year old male with past medical history significant for CAD (s/p proximal LAD stent in 2018), HLD, 40 pack year smoking history, history of aortic valve stenosis pending aortic valve replacement, presented 8/22 AM with ST elevations in inferior leads and an elevated trop of 0.88, now s/p cath and 1 STEPHENIE placed in OM, admitted to CCU for further monitoring and management, now stable enough for step down from CCU.     Cardio  #Inferolateral STEMI   He was found to have ST elevations in inferior leads and an elevated trop of 0.88. In the cath lab, he was found to have 85% occlusion to his proximal RCA, 75% occlusion to his mid RCA, 50% occlusion in the left main artery, and 75% occlusion to LAD proximal. His OM1 was found to be 100% thrombosed, was determined to be the culprit lesion, and STENT was placed. As per CTS, switched patient from Brillanta 90 mg to Plavix 75mg in preparation for upcoming procedure.   -C/w Plavix 75 mg  -C/w aspirin 81 mg daily  -c/w lipitor 80 mg at bedtime  -f/u daily EKGs    #Severe aortic stenosis- pending aortic valve replacement   -f/u with CT surgery     #HLD  -Lipid panel showed cholesterol 276, Direct , HDL: 34  -Continue with lipitor 80 mg daily    #HFrEF:   -Left ventricular systolic function is jriron-rl-finmaoanut reduced with a calculated ejection fraction of 40-45% with regional wall motion abnormalities.   CXRs showing pulmonary edema, fluid congestion worse than yesterday likely secondary to 500 mL fluid bolus  -c/w Metoprolol 25g   -c/w Lasix 20mg PO, continue to monitor BP    Pulm  #Pulmonary Edema  -Monitor respiratory status  -c/w Lasix PO 20 mg    #COPD-40 pack year smoking history with chronic cough  -On Duonebs Q6h and benzonatate 100 mg PO TID PRN for coughing  -f/u daily CXR  -nicotine patch transdermal daily   -will have patient follow up with pulm once discharged    Endocrine  -No active issues    Renal  -No active issues    GI  -No active issues    ID  #Community acquired pneumonia: resolved.        Preventative Measures  E: replete as needed  N: DASH/TLC diet   DVT: SCDs, lovenox 40 mg subQ q24h  GI PPX: none

## 2020-08-28 NOTE — CHART NOTE - NSCHARTNOTEFT_GEN_A_CORE
55y Male with a PMHx of CAD (s/p PCI to prox LA in 2018), HLD, current smoker (40 PY smoking hx), and AS who presented to [facility] ED on 8/22 with left sided chest pain, tightness, diaphoresis. He was found to have ST elevation in inferior leads and a troponin of 0.88. He was admitted under cardiology for a STEMI and was emergently brought to cath lab where he was found to have 85% occlusion to his proximal RCA, 75% occlusion to his mid RCA, 50% occlusion in the left main artery, 75% occlusion to LAD proximal, and OM1 100% thrombosed. A STEPHENIE was placed to OM1. An echo was obtained showing severe AS. Given patient's multi vessel CAD and AS CT surgery was consulted for surgical evaluation.    Plan: Multi vessel CAD and severe AS  - Discussed with primary team, plan to keep patient through the weekend given even this week with flash pulmonary edema. In light of recent STEMI, pt is at higher risk for AVR/CABG. Given patient will be here likely through the weekend, will reassess candidacy for surgery this admission in coming days. Discussed with Dr. Mitchell, who agrees with plan.  - Recommend PT over weekend, OOB, multiple daily walks.   - Please call [phone] with any questions.

## 2020-08-29 LAB
ALBUMIN SERPL ELPH-MCNC: 3.6 G/DL — SIGNIFICANT CHANGE UP (ref 3.3–5)
ALP SERPL-CCNC: 85 U/L — SIGNIFICANT CHANGE UP (ref 40–120)
ALT FLD-CCNC: 115 U/L — HIGH (ref 10–45)
ANION GAP SERPL CALC-SCNC: 10 MMOL/L — SIGNIFICANT CHANGE UP (ref 5–17)
AST SERPL-CCNC: 75 U/L — HIGH (ref 10–40)
BASOPHILS # BLD AUTO: 0.06 K/UL — SIGNIFICANT CHANGE UP (ref 0–0.2)
BASOPHILS NFR BLD AUTO: 0.8 % — SIGNIFICANT CHANGE UP (ref 0–2)
BILIRUB SERPL-MCNC: 0.5 MG/DL — SIGNIFICANT CHANGE UP (ref 0.2–1.2)
BUN SERPL-MCNC: 25 MG/DL — HIGH (ref 7–23)
CALCIUM SERPL-MCNC: 9.4 MG/DL — SIGNIFICANT CHANGE UP (ref 8.4–10.5)
CHLORIDE SERPL-SCNC: 102 MMOL/L — SIGNIFICANT CHANGE UP (ref 96–108)
CO2 SERPL-SCNC: 25 MMOL/L — SIGNIFICANT CHANGE UP (ref 22–31)
CREAT SERPL-MCNC: 0.87 MG/DL — SIGNIFICANT CHANGE UP (ref 0.5–1.3)
EOSINOPHIL # BLD AUTO: 0.46 K/UL — SIGNIFICANT CHANGE UP (ref 0–0.5)
EOSINOPHIL NFR BLD AUTO: 5.9 % — SIGNIFICANT CHANGE UP (ref 0–6)
GLUCOSE SERPL-MCNC: 105 MG/DL — HIGH (ref 70–99)
HCT VFR BLD CALC: 39.4 % — SIGNIFICANT CHANGE UP (ref 39–50)
HGB BLD-MCNC: 13.3 G/DL — SIGNIFICANT CHANGE UP (ref 13–17)
IMM GRANULOCYTES NFR BLD AUTO: 1 % — SIGNIFICANT CHANGE UP (ref 0–1.5)
LYMPHOCYTES # BLD AUTO: 1.58 K/UL — SIGNIFICANT CHANGE UP (ref 1–3.3)
LYMPHOCYTES # BLD AUTO: 20.3 % — SIGNIFICANT CHANGE UP (ref 13–44)
MAGNESIUM SERPL-MCNC: 2.2 MG/DL — SIGNIFICANT CHANGE UP (ref 1.6–2.6)
MCHC RBC-ENTMCNC: 30.1 PG — SIGNIFICANT CHANGE UP (ref 27–34)
MCHC RBC-ENTMCNC: 33.8 GM/DL — SIGNIFICANT CHANGE UP (ref 32–36)
MCV RBC AUTO: 89.1 FL — SIGNIFICANT CHANGE UP (ref 80–100)
MONOCYTES # BLD AUTO: 0.72 K/UL — SIGNIFICANT CHANGE UP (ref 0–0.9)
MONOCYTES NFR BLD AUTO: 9.2 % — SIGNIFICANT CHANGE UP (ref 2–14)
NEUTROPHILS # BLD AUTO: 4.89 K/UL — SIGNIFICANT CHANGE UP (ref 1.8–7.4)
NEUTROPHILS NFR BLD AUTO: 62.8 % — SIGNIFICANT CHANGE UP (ref 43–77)
NRBC # BLD: 0 /100 WBCS — SIGNIFICANT CHANGE UP (ref 0–0)
PHOSPHATE SERPL-MCNC: 3.4 MG/DL — SIGNIFICANT CHANGE UP (ref 2.5–4.5)
PLATELET # BLD AUTO: 254 K/UL — SIGNIFICANT CHANGE UP (ref 150–400)
POTASSIUM SERPL-MCNC: 4 MMOL/L — SIGNIFICANT CHANGE UP (ref 3.5–5.3)
POTASSIUM SERPL-SCNC: 4 MMOL/L — SIGNIFICANT CHANGE UP (ref 3.5–5.3)
PROT SERPL-MCNC: 7.1 G/DL — SIGNIFICANT CHANGE UP (ref 6–8.3)
RBC # BLD: 4.42 M/UL — SIGNIFICANT CHANGE UP (ref 4.2–5.8)
RBC # FLD: 12.4 % — SIGNIFICANT CHANGE UP (ref 10.3–14.5)
SODIUM SERPL-SCNC: 137 MMOL/L — SIGNIFICANT CHANGE UP (ref 135–145)
WBC # BLD: 7.79 K/UL — SIGNIFICANT CHANGE UP (ref 3.8–10.5)
WBC # FLD AUTO: 7.79 K/UL — SIGNIFICANT CHANGE UP (ref 3.8–10.5)

## 2020-08-29 PROCEDURE — 71045 X-RAY EXAM CHEST 1 VIEW: CPT | Mod: 26

## 2020-08-29 PROCEDURE — 99232 SBSQ HOSP IP/OBS MODERATE 35: CPT

## 2020-08-29 RX ORDER — IPRATROPIUM/ALBUTEROL SULFATE 18-103MCG
3 AEROSOL WITH ADAPTER (GRAM) INHALATION EVERY 6 HOURS
Refills: 0 | Status: DISCONTINUED | OUTPATIENT
Start: 2020-08-29 | End: 2020-08-31

## 2020-08-29 RX ADMIN — Medication 20 MILLIGRAM(S): at 18:11

## 2020-08-29 RX ADMIN — Medication 25 MILLIGRAM(S): at 13:41

## 2020-08-29 RX ADMIN — ATORVASTATIN CALCIUM 80 MILLIGRAM(S): 80 TABLET, FILM COATED ORAL at 21:36

## 2020-08-29 RX ADMIN — ENOXAPARIN SODIUM 40 MILLIGRAM(S): 100 INJECTION SUBCUTANEOUS at 21:36

## 2020-08-29 RX ADMIN — Medication 81 MILLIGRAM(S): at 12:31

## 2020-08-29 RX ADMIN — Medication 3 MILLILITER(S): at 05:59

## 2020-08-29 RX ADMIN — CLOPIDOGREL BISULFATE 75 MILLIGRAM(S): 75 TABLET, FILM COATED ORAL at 12:31

## 2020-08-29 RX ADMIN — Medication 5 MILLIGRAM(S): at 21:36

## 2020-08-29 NOTE — PROGRESS NOTE ADULT - PROBLEM SELECTOR PLAN 3
- Continue Lipitor 80mg daily at bedtime   - Pt only taking Crestor 40mg daily at bedtime as outpt.    VTE PPX: Lovenox  Dispo: pending clinical progression and Dr Landers's evaluation on Monday 8/31/2020

## 2020-08-29 NOTE — PROGRESS NOTE ADULT - SUBJECTIVE AND OBJECTIVE BOX
Interventional Cardiology PA Adult Progress Note    Subjective Assessment:  	  MEDICATIONS:  furosemide    Tablet 20 milliGRAM(s) Oral every 24 hours  metoprolol succinate ER 25 milliGRAM(s) Oral every 24 hours      albuterol/ipratropium for Nebulization 3 milliLiter(s) Nebulizer every 6 hours PRN  benzonatate 100 milliGRAM(s) Oral three times a day PRN    acetaminophen   Tablet .. 650 milliGRAM(s) Oral every 6 hours PRN  melatonin 5 milliGRAM(s) Oral at bedtime    polyethylene glycol 3350 17 Gram(s) Oral daily PRN  simethicone 80 milliGRAM(s) Chew daily PRN    atorvastatin 80 milliGRAM(s) Oral at bedtime    aspirin  chewable 81 milliGRAM(s) Oral daily  chlorhexidine 4% Liquid 1 Application(s) Topical <User Schedule>  clopidogrel Tablet 75 milliGRAM(s) Oral daily  enoxaparin Injectable 40 milliGRAM(s) SubCutaneous every 24 hours      	    [PHYSICAL EXAM:  TELEMETRY:  T(C): 36.8 (08-29-20 @ 14:06), Max: 37.3 (08-28-20 @ 18:08)  HR: 78 (08-29-20 @ 13:40) (68 - 87)  BP: 102/66 (08-29-20 @ 13:40) (94/59 - 111/64)  RR: 18 (08-29-20 @ 13:40) (18 - 22)  SpO2: 99% (08-29-20 @ 13:40) (95% - 99%)  Wt(kg): --  I&O's Summary    28 Aug 2020 07:01  -  29 Aug 2020 07:00  --------------------------------------------------------  IN: 417 mL / OUT: 220 mL / NET: 197 mL    29 Aug 2020 07:01  -  29 Aug 2020 15:24  --------------------------------------------------------  IN: 540 mL / OUT: 0 mL / NET: 540 mL        Benitez:  Central/PICC/Mid Line:                                         Appearance: Normal	  HEENT:   Normal oral mucosa, PERRL, EOMI	  Neck: Supple, + JVD/ - JVD; Carotid Bruit   Cardiovascular: Normal S1 S2, No JVD, No murmurs,   Respiratory: Lungs clear to auscultation/Decreased Breath Sounds/No Rales, Rhonchi, Wheezing	  Gastrointestinal:  Soft, Non-tender, + BS	  Skin: No rashes, No ecchymoses, No cyanosis  Extremities: Normal range of motion, No clubbing, cyanosis or edema  Vascular: Peripheral pulses palpable 2+ bilaterally  Neurologic: Non-focal  Psychiatry: A & O x 3, Mood & affect appropriate                          13.3   7.79  )-----------( 254      ( 29 Aug 2020 05:59 )             39.4     08-29    137  |  102  |  25<H>  ----------------------------<  105<H>  4.0   |  25  |  0.87    Ca    9.4      29 Aug 2020 05:59  Phos  3.4     08-29  Mg     2.2     08-29    TPro  7.1  /  Alb  3.6  /  TBili  0.5  /  DBili  x   /  AST  75<H>  /  ALT  115<H>  /  AlkPhos  85  08-29    proBNP:   Lipid Profile:   HgA1c:   TSH:       ASSESSMENT/PLAN: 	        DVT ppx:  Dispo: Interventional Cardiology PA Adult Progress Note    CC: chest pain  Subjective Assessment:    Currently asymptomatic. Intermittently tearful and anxious to go home.     ROS otherwise negative except for   MEDICATIONS:  furosemide    Tablet 20 milliGRAM(s) Oral every 24 hours  metoprolol succinate ER 25 milliGRAM(s) Oral every 24 hours  albuterol/ipratropium for Nebulization 3 milliLiter(s) Nebulizer every 6 hours PRN  benzonatate 100 milliGRAM(s) Oral three times a day PRN  acetaminophen   Tablet .. 650 milliGRAM(s) Oral every 6 hours PRN  melatonin 5 milliGRAM(s) Oral at bedtime  polyethylene glycol 3350 17 Gram(s) Oral daily PRN  simethicone 80 milliGRAM(s) Chew daily PRN  atorvastatin 80 milliGRAM(s) Oral at bedtime  aspirin  chewable 81 milliGRAM(s) Oral daily  chlorhexidine 4% Liquid 1 Application(s) Topical <User Schedule>  clopidogrel Tablet 75 milliGRAM(s) Oral daily  enoxaparin Injectable 40 milliGRAM(s) SubCutaneous every 24 hours    PHYSICAL EXAM:  TELEMETRY: intermittent sinus bradycardia  T(C): 36.8 (08-29-20 @ 14:06), Max: 37.3 (08-28-20 @ 18:08)  HR: 78 (08-29-20 @ 13:40) (68 - 87)  BP: 102/66 (08-29-20 @ 13:40) (94/59 - 111/64)  RR: 18 (08-29-20 @ 13:40) (18 - 22)  SpO2: 99% (08-29-20 @ 13:40) (95% - 99%)  Wt(kg): --  I&O's Summary    28 Aug 2020 07:01  -  29 Aug 2020 07:00  --------------------------------------------------------  IN: 417 mL / OUT: 220 mL / NET: 197 mL    29 Aug 2020 07:01  -  29 Aug 2020 15:24  --------------------------------------------------------  IN: 540 mL / OUT: 0 mL / NET: 540 mL    Benitez: none  Central/PICC/Mid Line: none                                       Appearance: Normal	  HEENT:   Normal oral mucosa, PERRL, EOMI	  Neck: Supple  Cardiovascular: Normal S1 S2, No JVD, No murmurs,   Respiratory: Lungs clear to auscultation b/l, No Rales, Rhonchi, Wheezing	  Gastrointestinal:  Soft, Non-tender, + BS	  Skin: No rashes, No ecchymoses, No cyanosis  Extremities: Normal range of motion, No clubbing, cyanosis or edema, right radial site stable.  Vascular: Peripheral pulses palpable 2+ bilaterally  Neurologic: Non-focal  Psychiatry: A & O x 3, Mood & affect appropriate                          13.3   7.79  )-----------( 254      ( 29 Aug 2020 05:59 )             39.4     08-29    137  |  102  |  25<H>  ----------------------------<  105<H>  4.0   |  25  |  0.87    Ca    9.4      29 Aug 2020 05:59  Phos  3.4     08-29  Mg     2.2     08-29    TPro  7.1  /  Alb  3.6  /  TBili  0.5  /  DBili  x   /  AST  75<H>  /  ALT  115<H>  /  AlkPhos  85  08-29      ASSESSMENT/PLAN:

## 2020-08-29 NOTE — PROGRESS NOTE ADULT - ASSESSMENT
55 year old male, current 40 pack year smoker, with PMHx HLD, CAD (s/p proximal LAD stent in 2018), severe AS (bicuspid valve) undergoing outpt workup for aortic valve replacement, presented Saturday 8/22/2020 morning from his home with left sided chest pain, tightness, diaphoresis, and shortness of breath. Pt ruled in STEMI and went to cardiac cath on 8/22/2020 with STEPHENIE OM1 and residual LM 50%, pRCA 85%, mRCA 75%. D1/LCX mild luminal irregularities. Right radial site stable. Pt confirmed to have severe AS, AIDA 0.6, with a bicuspid aortic valve and CTS consulted for surgical AVR and possible CABG for remaining disease, timing to be determined by CTS Dr Landers. Dr Landers hopes pt will be stable enough for d/c and come for outpt surgery in a few weeks. Pt had LLL lobe pneumonia treated with Ceftriaxone and Azithromycin. On 08/25/2020 pt had vasovagal episode with SBPs 60s/40s and HR 40s requiring multiple fluid boluses. Pt then required Lasix for acute systolic CHF exacerbation (EF 40% from Echo on 8/26/2020. Pt was stepped down from CCU on 08/28/2020 and will be maintained over the weekend to determine if pt will remain inpatient or be discharged and return for outpt surgery.

## 2020-08-29 NOTE — PROGRESS NOTE ADULT - SUBJECTIVE AND OBJECTIVE BOX
[facility] Cardiology Consultation    CHIEF COMPLAINT: CP    HISTORY OF PRESENT ILLNESS: 56 y/o [facility] as a [facility]U stepdown. Patient initially presented as a STEMI. He was noted to have AS and significant multivessel diease requiring intervention. As he had a complicated hospital course, patient is being monitored on tele during the weekend for further surgical intervention.     PAST MEDICAL & SURGICAL HISTORY:  Hypertension  Hypercholesteremia  Stented coronary artery  S/P appendectomy        Allergies    No Known Allergies    Intolerances      MEDICATIONS:  furosemide    Tablet 20 milliGRAM(s) Oral every 24 hours  metoprolol succinate ER 25 milliGRAM(s) Oral every 24 hours      albuterol/ipratropium for Nebulization 3 milliLiter(s) Nebulizer every 6 hours PRN  benzonatate 100 milliGRAM(s) Oral three times a day PRN    acetaminophen   Tablet .. 650 milliGRAM(s) Oral every 6 hours PRN  melatonin 5 milliGRAM(s) Oral at bedtime    polyethylene glycol 3350 17 Gram(s) Oral daily PRN  simethicone 80 milliGRAM(s) Chew daily PRN    atorvastatin 80 milliGRAM(s) Oral at bedtime    aspirin  chewable 81 milliGRAM(s) Oral daily  chlorhexidine 4% Liquid 1 Application(s) Topical <User Schedule>  clopidogrel Tablet 75 milliGRAM(s) Oral daily  enoxaparin Injectable 40 milliGRAM(s) SubCutaneous every 24 hours    FAMILY HISTORY:  FH: hyperlipidemia  FH: coronary artery disease    SOCIAL HISTORY:  no EtOH, tobacco or drug use    REVIEW OF SYSTEMS:  CONSTITUTIONAL: No fever, weight loss, or fatigue  EYES: No eye pain, visual disturbances, or discharge  ENMT:  No difficulty hearing, tinnitus, vertigo; No sinus or throat pain  NECK: No pain or stiffness  BREASTS: No pain, masses, or nipple discharge  RESPIRATORY: No cough, wheezing, chills or hemoptysis; No Shortness of Breath  CARDIOVASCULAR: No chest pain, palpitations, dizziness, or leg swelling  GASTROINTESTINAL: No abdominal or epigastric pain. No nausea, vomiting, or hematemesis; No diarrhea or constipation. No melena or hematochezia.  GENITOURINARY: No dysuria, frequency, hematuria, or incontinence  NEUROLOGICAL: No headaches, memory loss, loss of strength, numbness, or tremors  SKIN: No itching, burning, rashes, or lesions   LYMPH Nodes: No enlarged glands  ENDOCRINE: No heat or cold intolerance; No hair loss  MUSCULOSKELETAL: No joint pain or swelling; No muscle, back, or extremity pain  PSYCHIATRIC: No depression, anxiety, mood swings, or difficulty sleeping  HEME/LYMPH: No easy bruising, or bleeding gums  ALLERY AND IMMUNOLOGIC: No hives or eczema	      PHYSICAL EXAM:  T(C): 36.8 (08-29-20 @ 14:06), Max: 37.3 (08-28-20 @ 18:08)  HR: 78 (08-29-20 @ 13:40) (68 - 87)  BP: 102/66 (08-29-20 @ 13:40) (94/59 - 111/64)  RR: 18 (08-29-20 @ 13:40) (18 - 22)  SpO2: 99% (08-29-20 @ 13:40) (95% - 99%)  Wt(kg): --  I&O's Summary    28 Aug 2020 07:01  -  29 Aug 2020 07:00  --------------------------------------------------------  IN: 417 mL / OUT: 220 mL / NET: 197 mL    29 Aug 2020 07:01  -  29 Aug 2020 15:01  --------------------------------------------------------  IN: 540 mL / OUT: 0 mL / NET: 540 mL      Appearance: middle aged man NAD 	  HEENT:   Normal oral mucosa, PERRL, EOMI	  Lymphatic: No lymphadenopathy  Cardiovascular: Normal S1 S2, No JVD, ROYCE 2/6  Respiratory: Lungs clear to auscultation	  Psychiatry: A & O x 3, Mood & affect appropriate  Gastrointestinal:  Soft, Non-tender, + BS	  Skin: No rashes, No ecchymoses, No cyanosis	  Neurologic: Non-focal  Extremities: Normal range of motion, No clubbing, cyanosis or edema  Vascular: Peripheral pulses palpable 2+ bilaterally  	    ECG:  	SR no ST-T changes     LABS:	 	  CARDIAC MARKERS:                                  13.3   7.79  )-----------( 254      ( 29 Aug 2020 05:59 )             39.4     08-29    137  |  102  |  25<H>  ----------------------------<  105<H>  4.0   |  25  |  0.87    Ca    9.4      29 Aug 2020 05:59  Phos  3.4     08-29  Mg     2.2     08-29    TPro  7.1  /  Alb  3.6  /  TBili  0.5  /  DBili  x   /  AST  75<H>  /  ALT  115<H>  /  AlkPhos  85  08-29    proBNP:   TSH:     ASSESSMENT/PLAN: 	56 y/o male with AS and multivessel disease presents for further work up and surgical planning  1. patient seen and examined, chart reviewed  2. appears comfortable, no symptoms at the moment, no events over night.   3. encourage to ambulate  4. cont current medications  5. plan as per Structural Heart Disease and CT Surgery Teams next week     I spent 25 min in care of this patient

## 2020-08-30 LAB
ALBUMIN SERPL ELPH-MCNC: 3.7 G/DL — SIGNIFICANT CHANGE UP (ref 3.3–5)
ALP SERPL-CCNC: 88 U/L — SIGNIFICANT CHANGE UP (ref 40–120)
ALT FLD-CCNC: 122 U/L — HIGH (ref 10–45)
ANION GAP SERPL CALC-SCNC: 13 MMOL/L — SIGNIFICANT CHANGE UP (ref 5–17)
AST SERPL-CCNC: 68 U/L — HIGH (ref 10–40)
BILIRUB SERPL-MCNC: 0.6 MG/DL — SIGNIFICANT CHANGE UP (ref 0.2–1.2)
BUN SERPL-MCNC: 24 MG/DL — HIGH (ref 7–23)
CALCIUM SERPL-MCNC: 9.1 MG/DL — SIGNIFICANT CHANGE UP (ref 8.4–10.5)
CHLORIDE SERPL-SCNC: 102 MMOL/L — SIGNIFICANT CHANGE UP (ref 96–108)
CO2 SERPL-SCNC: 24 MMOL/L — SIGNIFICANT CHANGE UP (ref 22–31)
CREAT SERPL-MCNC: 0.83 MG/DL — SIGNIFICANT CHANGE UP (ref 0.5–1.3)
GLUCOSE SERPL-MCNC: 96 MG/DL — SIGNIFICANT CHANGE UP (ref 70–99)
HCT VFR BLD CALC: 39.1 % — SIGNIFICANT CHANGE UP (ref 39–50)
HGB BLD-MCNC: 13.1 G/DL — SIGNIFICANT CHANGE UP (ref 13–17)
MAGNESIUM SERPL-MCNC: 2.2 MG/DL — SIGNIFICANT CHANGE UP (ref 1.6–2.6)
MCHC RBC-ENTMCNC: 29.5 PG — SIGNIFICANT CHANGE UP (ref 27–34)
MCHC RBC-ENTMCNC: 33.5 GM/DL — SIGNIFICANT CHANGE UP (ref 32–36)
MCV RBC AUTO: 88.1 FL — SIGNIFICANT CHANGE UP (ref 80–100)
NRBC # BLD: 0 /100 WBCS — SIGNIFICANT CHANGE UP (ref 0–0)
PLATELET # BLD AUTO: 272 K/UL — SIGNIFICANT CHANGE UP (ref 150–400)
POTASSIUM SERPL-MCNC: 4.1 MMOL/L — SIGNIFICANT CHANGE UP (ref 3.5–5.3)
POTASSIUM SERPL-SCNC: 4.1 MMOL/L — SIGNIFICANT CHANGE UP (ref 3.5–5.3)
PROT SERPL-MCNC: 7.6 G/DL — SIGNIFICANT CHANGE UP (ref 6–8.3)
RBC # BLD: 4.44 M/UL — SIGNIFICANT CHANGE UP (ref 4.2–5.8)
RBC # FLD: 12.3 % — SIGNIFICANT CHANGE UP (ref 10.3–14.5)
SODIUM SERPL-SCNC: 139 MMOL/L — SIGNIFICANT CHANGE UP (ref 135–145)
WBC # BLD: 7.14 K/UL — SIGNIFICANT CHANGE UP (ref 3.8–10.5)
WBC # FLD AUTO: 7.14 K/UL — SIGNIFICANT CHANGE UP (ref 3.8–10.5)

## 2020-08-30 PROCEDURE — 99232 SBSQ HOSP IP/OBS MODERATE 35: CPT

## 2020-08-30 RX ADMIN — Medication 25 MILLIGRAM(S): at 11:41

## 2020-08-30 RX ADMIN — CLOPIDOGREL BISULFATE 75 MILLIGRAM(S): 75 TABLET, FILM COATED ORAL at 11:41

## 2020-08-30 RX ADMIN — Medication 5 MILLIGRAM(S): at 21:36

## 2020-08-30 RX ADMIN — ENOXAPARIN SODIUM 40 MILLIGRAM(S): 100 INJECTION SUBCUTANEOUS at 21:36

## 2020-08-30 RX ADMIN — ATORVASTATIN CALCIUM 80 MILLIGRAM(S): 80 TABLET, FILM COATED ORAL at 21:36

## 2020-08-30 RX ADMIN — Medication 81 MILLIGRAM(S): at 11:41

## 2020-08-30 RX ADMIN — Medication 20 MILLIGRAM(S): at 11:41

## 2020-08-30 NOTE — PROGRESS NOTE ADULT - ASSESSMENT
55 year old male, current 40 pack year smoker, with PMHx HLD, CAD (s/p proximal LAD stent in 2018), severe AS (bicuspid valve) undergoing outpt workup for aortic valve replacement, presented Saturday 8/22/2020 morning from his home with left sided chest pain, tightness, diaphoresis, and shortness of breath. Pt ruled in STEMI and went to cardiac cath on 8/22/2020 with STEPHENIE OM1 and residual LM 50%, pRCA 85%, mRCA 75%. D1/LCX mild luminal irregularities. Right radial site stable. Pt confirmed to have severe AS, AIDA 0.6, with a bicuspid aortic valve and CTS consulted for surgical AVR and possible CABG for remaining disease, timing to be determined by CTS Dr Landers. Dr Landers hopes pt will be stable enough for d/c and come for outpt surgery in a few weeks. Pt had LLL lobe pneumonia treated with Ceftriaxone and Azithromycin. On 08/25/2020 pt had vasovagal episode with SBPs 60s/40s and HR 40s requiring multiple fluid boluses. Pt then required Lasix for acute systolic CHF exacerbation (EF 40% from Echo on 8/26/2020. Pt was stepped down from CCU on 08/28/2020 and will be maintained over the weekend to determine if pt will remain inpatient or be discharged and return for outpt surgery. 55 year old male, current 40 pack year smoker, with PMHx HLD, CAD (s/p proximal LAD stent in 2018), severe AS (bicuspid valve) undergoing outpt workup for aortic valve replacement, presented Saturday 8/22/2020 morning from his home with left sided chest pain, tightness, diaphoresis, and shortness of breath. Pt ruled in STEMI and went to cardiac cath on 8/22/2020 with STEPHENIE OM1 and residual LM 50%, pRCA 85%, mRCA 75%. D1/LCX mild luminal irregularities. Right radial site stable. Pt confirmed to have severe AS, AIDA 0.6, with a bicuspid aortic valve and CTS consulted for surgical AVR and possible CABG for remaining disease, timing to be determined by CTS Dr Landers. Dr Landers hopes pt will be stable enough for d/c and come for outpt surgery in a few weeks. Pt had LLL lobe pneumonia treated with Ceftriaxone and Azithromycin. On 08/25/2020 pt had vasovagal episode with SBPs 60s/40s and HR 40s requiring multiple fluid boluses. Pt then required Lasix for acute systolic CHF exacerbation (EF 40% from Echo on 8/26/2020. Pt was stepped down from CCU on 08/28/2020 patient to be reevaluated by CT surg to decided if CABG/AVR on this admission or be discharged home

## 2020-08-30 NOTE — PROGRESS NOTE ADULT - PROBLEM SELECTOR PLAN 2
- bicuspid etiology, AIDA 0.6.   - CONTINUE lasix 20mg daily  - CONTINUE toprol 25mg   - will require surgical AVR in the near future  - As per Dr Landers best to 'cool off' and mobilize/discharge patient prior to bringing him back for AVR/CABG. - bicuspid etiology, AIDA 0.6.   - CONTINUE Lasix 20mg daily  - CONTINUE Toprol 25mg   - will require surgical AVR in the near future  - As per Dr Landers best to 'cool off' and mobilize/discharge patient prior to bringing him back for AVR/CABG.

## 2020-08-30 NOTE — PROGRESS NOTE ADULT - PROBLEM SELECTOR PLAN 1
- Presented with STEMI and underwent cardiac cath 8/22/2020 STEPHENIE OM1 (100% thrombosed). Residual LM 50%. pLAD 75%, patent mLAD stent. D1/LCx mild luminal irregularities. prox RCA 85%. mRCA 75%.   - CTS Dr Landers consulted and Brilinta 90mg BID was switched to Plavix 75mg daily  on 8/27/2020.   -CONTINUE Plavix 75mg, aspirin 81mg, Toprol 25mg   - Continue Lipitor 80mg daily at bedtime  - structural/CT surg Dr. Mitchell following- f/u recs for CABG/AVR on this admission vs outpatient   -f/u daily EKGs - Presented with STEMI and underwent cardiac cath 8/22/2020 STEPHENIE OM1 (100% thrombosed). Residual LM 50%. pLAD 75%, patent mLAD stent. D1/LCx mild luminal irregularities. prox RCA 85%. mRCA 75%.   - CTS Dr Landers consulted and Brilinta 90mg BID was switched to Plavix 75mg daily  on 8/27/2020.   -CONTINUE Plavix 75mg, aspirin 81mg, Toprol 25mg   -CONTINUE Lipitor 80mg daily at bedtime  - structural/CT surg Dr. Mitchell following- f/u recs for CABG/AVR on this admission vs outpatient   -f/u daily EKGs

## 2020-08-30 NOTE — PROGRESS NOTE ADULT - PROBLEM SELECTOR PLAN 3
- Continue Lipitor 80mg daily at bedtime   - Pt only taking Crestor 40mg daily at bedtime as outpt.    VTE PPX: Lovenox  Dispo: pending clinical progression and CT surgery evaluation on Monday 8/31/2020 - Continue Lipitor 80mg daily at bedtime   - Pt only taking Crestor 40mg daily at bedtime as outpt.    VTE PPX: Lovenox  Dispo:  CT surgery evaluation on Monday 8/31/2020

## 2020-08-30 NOTE — PROGRESS NOTE ADULT - SUBJECTIVE AND OBJECTIVE BOX
Interventional Cardiology PA Adult Progress Note    Subjective Assessment: Patient seen and examined at bedside,   	  MEDICATIONS:  furosemide    Tablet 20 milliGRAM(s) Oral every 24 hours  metoprolol succinate ER 25 milliGRAM(s) Oral every 24 hours  albuterol/ipratropium for Nebulization 3 milliLiter(s) Nebulizer every 6 hours PRN  benzonatate 100 milliGRAM(s) Oral three times a day PRN  acetaminophen   Tablet .. 650 milliGRAM(s) Oral every 6 hours PRN  melatonin 5 milliGRAM(s) Oral at bedtime  polyethylene glycol 3350 17 Gram(s) Oral daily PRN  simethicone 80 milliGRAM(s) Chew daily PRN  atorvastatin 80 milliGRAM(s) Oral at bedtime  aspirin  chewable 81 milliGRAM(s) Oral daily  chlorhexidine 4% Liquid 1 Application(s) Topical <User Schedule>  clopidogrel Tablet 75 milliGRAM(s) Oral daily  enoxaparin Injectable 40 milliGRAM(s) SubCutaneous every 24 hours    [PHYSICAL EXAM:  TELEMETRY:  T(C): 36.3 (08-30-20 @ 06:20), Max: 36.9 (08-29-20 @ 22:00)  HR: 65 (08-30-20 @ 06:09) (65 - 78)  BP: 99/56 (08-30-20 @ 06:09) (80/48 - 104/57)  RR: 16 (08-30-20 @ 06:09) (16 - 19)  SpO2: 95% (08-30-20 @ 06:09) (95% - 99%)  Wt(kg): --  I&O's Summary    29 Aug 2020 07:01  -  30 Aug 2020 07:00  --------------------------------------------------------  IN: 660 mL / OUT: 250 mL / NET: 410 mL                               Appearance: Normal	  HEENT:   Normal oral mucosa, PERRL, EOMI	  Neck: Supple, + JVD/ - JVD; Carotid Bruit   Cardiovascular: Normal S1 S2, No JVD, No murmurs,   Respiratory: Lungs clear to auscultation/Decreased Breath Sounds/No Rales, Rhonchi, Wheezing	  Gastrointestinal:  Soft, Non-tender, + BS	  Skin: No rashes, No ecchymoses, No cyanosis  Extremities: Normal range of motion, No clubbing, cyanosis or edema  Vascular: Peripheral pulses palpable 2+ bilaterally  Neurologic: Non-focal  Psychiatry: A & O x 3, Mood & affect appropriate                          13.3   7.79  )-----------( 254      ( 29 Aug 2020 05:59 )             39.4     08-29    137  |  102  |  25<H>  ----------------------------<  105<H>  4.0   |  25  |  0.87    Ca    9.4      29 Aug 2020 05:59  Phos  3.4     08-29  Mg     2.2     08-29    TPro  7.1  /  Alb  3.6  /  TBili  0.5  /  DBili  x   /  AST  75<H>  /  ALT  115<H>  /  AlkPhos  85  08-29 Interventional Cardiology PA Adult Progress Note    Subjective Assessment: Patient seen and examined at bedside, feeling signifcantly improved from before admission, patient states he has walked in the hallway and upstairs with some shortness of breath. Patient denies all other complaints  	  MEDICATIONS:  furosemide    Tablet 20 milliGRAM(s) Oral every 24 hours  metoprolol succinate ER 25 milliGRAM(s) Oral every 24 hours  albuterol/ipratropium for Nebulization 3 milliLiter(s) Nebulizer every 6 hours PRN  benzonatate 100 milliGRAM(s) Oral three times a day PRN  acetaminophen   Tablet .. 650 milliGRAM(s) Oral every 6 hours PRN  melatonin 5 milliGRAM(s) Oral at bedtime  polyethylene glycol 3350 17 Gram(s) Oral daily PRN  simethicone 80 milliGRAM(s) Chew daily PRN  atorvastatin 80 milliGRAM(s) Oral at bedtime  aspirin  chewable 81 milliGRAM(s) Oral daily  chlorhexidine 4% Liquid 1 Application(s) Topical <User Schedule>  clopidogrel Tablet 75 milliGRAM(s) Oral daily  enoxaparin Injectable 40 milliGRAM(s) SubCutaneous every 24 hours    [PHYSICAL EXAM:  TELEMETRY:  T(C): 36.3 (08-30-20 @ 06:20), Max: 36.9 (08-29-20 @ 22:00)  HR: 65 (08-30-20 @ 06:09) (65 - 78)  BP: 99/56 (08-30-20 @ 06:09) (80/48 - 104/57)  RR: 16 (08-30-20 @ 06:09) (16 - 19)  SpO2: 95% (08-30-20 @ 06:09) (95% - 99%)  Wt(kg): --  I&O's Summary    29 Aug 2020 07:01  -  30 Aug 2020 07:00  --------------------------------------------------------  IN: 660 mL / OUT: 250 mL / NET: 410 mL                               Appearance: Normal	  HEENT:   Normal oral mucosa, PERRL, EOMI	  Cardiovascular: Normal S1 S2, No JVD +murmur grade III, radiates to carrotids  Respiratory: Lungs clear to auscultation, No Rales, Rhonchi, Wheezing		  Skin: No rashes, No ecchymoses, No cyanosis  Extremities: Normal range of motion, No clubbing, cyanosis or edema  Neurologic: Non-focal  Psychiatry: A & O x 3, Mood & affect appropriate                          13.3   7.79  )-----------( 254      ( 29 Aug 2020 05:59 )             39.4     08-29    137  |  102  |  25<H>  ----------------------------<  105<H>  4.0   |  25  |  0.87    Ca    9.4      29 Aug 2020 05:59  Phos  3.4     08-29  Mg     2.2     08-29    TPro  7.1  /  Alb  3.6  /  TBili  0.5  /  DBili  x   /  AST  75<H>  /  ALT  115<H>  /  AlkPhos  85  08-29

## 2020-08-31 ENCOUNTER — TRANSCRIPTION ENCOUNTER (OUTPATIENT)
Age: 55
End: 2020-08-31

## 2020-08-31 VITALS
HEART RATE: 73 BPM | OXYGEN SATURATION: 97 % | DIASTOLIC BLOOD PRESSURE: 61 MMHG | SYSTOLIC BLOOD PRESSURE: 105 MMHG | RESPIRATION RATE: 16 BRPM

## 2020-08-31 LAB
ANION GAP SERPL CALC-SCNC: 11 MMOL/L — SIGNIFICANT CHANGE UP (ref 5–17)
BASOPHILS # BLD AUTO: 0.05 K/UL — SIGNIFICANT CHANGE UP (ref 0–0.2)
BASOPHILS NFR BLD AUTO: 0.7 % — SIGNIFICANT CHANGE UP (ref 0–2)
BUN SERPL-MCNC: 22 MG/DL — SIGNIFICANT CHANGE UP (ref 7–23)
CALCIUM SERPL-MCNC: 9.1 MG/DL — SIGNIFICANT CHANGE UP (ref 8.4–10.5)
CHLORIDE SERPL-SCNC: 104 MMOL/L — SIGNIFICANT CHANGE UP (ref 96–108)
CO2 SERPL-SCNC: 23 MMOL/L — SIGNIFICANT CHANGE UP (ref 22–31)
CREAT SERPL-MCNC: 0.84 MG/DL — SIGNIFICANT CHANGE UP (ref 0.5–1.3)
EOSINOPHIL # BLD AUTO: 0.29 K/UL — SIGNIFICANT CHANGE UP (ref 0–0.5)
EOSINOPHIL NFR BLD AUTO: 3.8 % — SIGNIFICANT CHANGE UP (ref 0–6)
GLUCOSE SERPL-MCNC: 121 MG/DL — HIGH (ref 70–99)
HCT VFR BLD CALC: 38.4 % — LOW (ref 39–50)
HGB BLD-MCNC: 12.9 G/DL — LOW (ref 13–17)
IMM GRANULOCYTES NFR BLD AUTO: 1.2 % — SIGNIFICANT CHANGE UP (ref 0–1.5)
LYMPHOCYTES # BLD AUTO: 1.86 K/UL — SIGNIFICANT CHANGE UP (ref 1–3.3)
LYMPHOCYTES # BLD AUTO: 24.4 % — SIGNIFICANT CHANGE UP (ref 13–44)
MAGNESIUM SERPL-MCNC: 2.1 MG/DL — SIGNIFICANT CHANGE UP (ref 1.6–2.6)
MCHC RBC-ENTMCNC: 29.7 PG — SIGNIFICANT CHANGE UP (ref 27–34)
MCHC RBC-ENTMCNC: 33.6 GM/DL — SIGNIFICANT CHANGE UP (ref 32–36)
MCV RBC AUTO: 88.5 FL — SIGNIFICANT CHANGE UP (ref 80–100)
MONOCYTES # BLD AUTO: 0.56 K/UL — SIGNIFICANT CHANGE UP (ref 0–0.9)
MONOCYTES NFR BLD AUTO: 7.3 % — SIGNIFICANT CHANGE UP (ref 2–14)
NEUTROPHILS # BLD AUTO: 4.78 K/UL — SIGNIFICANT CHANGE UP (ref 1.8–7.4)
NEUTROPHILS NFR BLD AUTO: 62.6 % — SIGNIFICANT CHANGE UP (ref 43–77)
NRBC # BLD: 0 /100 WBCS — SIGNIFICANT CHANGE UP (ref 0–0)
PLATELET # BLD AUTO: 253 K/UL — SIGNIFICANT CHANGE UP (ref 150–400)
POTASSIUM SERPL-MCNC: 4 MMOL/L — SIGNIFICANT CHANGE UP (ref 3.5–5.3)
POTASSIUM SERPL-SCNC: 4 MMOL/L — SIGNIFICANT CHANGE UP (ref 3.5–5.3)
RBC # BLD: 4.34 M/UL — SIGNIFICANT CHANGE UP (ref 4.2–5.8)
RBC # FLD: 12.3 % — SIGNIFICANT CHANGE UP (ref 10.3–14.5)
SODIUM SERPL-SCNC: 138 MMOL/L — SIGNIFICANT CHANGE UP (ref 135–145)
WBC # BLD: 7.63 K/UL — SIGNIFICANT CHANGE UP (ref 3.8–10.5)
WBC # FLD AUTO: 7.63 K/UL — SIGNIFICANT CHANGE UP (ref 3.8–10.5)

## 2020-08-31 PROCEDURE — 99233 SBSQ HOSP IP/OBS HIGH 50: CPT

## 2020-08-31 PROCEDURE — 99239 HOSP IP/OBS DSCHRG MGMT >30: CPT

## 2020-08-31 RX ORDER — FUROSEMIDE 40 MG
1 TABLET ORAL
Qty: 30 | Refills: 2
Start: 2020-08-31 | End: 2020-11-28

## 2020-08-31 RX ORDER — CLOPIDOGREL BISULFATE 75 MG/1
1 TABLET, FILM COATED ORAL
Qty: 30 | Refills: 4
Start: 2020-08-31 | End: 2021-01-27

## 2020-08-31 RX ORDER — ASPIRIN/CALCIUM CARB/MAGNESIUM 324 MG
1 TABLET ORAL
Qty: 30 | Refills: 4
Start: 2020-08-31 | End: 2021-01-27

## 2020-08-31 RX ORDER — SIMVASTATIN 20 MG/1
1 TABLET, FILM COATED ORAL
Qty: 0 | Refills: 0 | DISCHARGE

## 2020-08-31 RX ORDER — METOPROLOL TARTRATE 50 MG
1 TABLET ORAL
Qty: 30 | Refills: 1
Start: 2020-08-31 | End: 2020-10-29

## 2020-08-31 RX ORDER — PANTOPRAZOLE SODIUM 20 MG/1
1 TABLET, DELAYED RELEASE ORAL
Qty: 30 | Refills: 0
Start: 2020-08-31 | End: 2020-09-29

## 2020-08-31 RX ORDER — PANTOPRAZOLE SODIUM 20 MG/1
40 TABLET, DELAYED RELEASE ORAL
Refills: 0 | Status: DISCONTINUED | OUTPATIENT
Start: 2020-08-31 | End: 2020-08-31

## 2020-08-31 RX ORDER — SIMVASTATIN 20 MG/1
1 TABLET, FILM COATED ORAL
Qty: 30 | Refills: 2
Start: 2020-08-31 | End: 2020-11-28

## 2020-08-31 RX ORDER — NICOTINE POLACRILEX 2 MG
1 GUM BUCCAL
Qty: 30 | Refills: 0
Start: 2020-08-31 | End: 2020-09-29

## 2020-08-31 RX ADMIN — Medication 81 MILLIGRAM(S): at 11:06

## 2020-08-31 RX ADMIN — CLOPIDOGREL BISULFATE 75 MILLIGRAM(S): 75 TABLET, FILM COATED ORAL at 11:06

## 2020-08-31 RX ADMIN — PANTOPRAZOLE SODIUM 40 MILLIGRAM(S): 20 TABLET, DELAYED RELEASE ORAL at 09:12

## 2020-08-31 RX ADMIN — Medication 20 MILLIGRAM(S): at 11:07

## 2020-08-31 NOTE — DISCHARGE NOTE NURSING/CASE MANAGEMENT/SOCIAL WORK - PATIENT PORTAL LINK FT
You can access the FollowMyHealth Patient Portal offered by [facility] by registering at the following website: [email or web address]. By joining [employer]’s FollowMyHealth portal, you will also be able to view your health information using other applications (apps) compatible with our system.

## 2020-08-31 NOTE — PROGRESS NOTE ADULT - PROVIDER SPECIALTY LIST ADULT
CCU
CT Surgery
CT Surgery
Cardiology
Intervent Cardiology
Structural Heart
Structural Heart
CCU

## 2020-08-31 NOTE — PROGRESS NOTE ADULT - SUBJECTIVE AND OBJECTIVE BOX
Subjective:  - Doing much better, able to walk around without CP/SOB  - Events/Chart from overnight reviewed    PAST MEDICAL & SURGICAL HISTORY:  Hypertension  Hypercholesteremia  Stented coronary artery  S/P appendectomy      Physical Exam:   GEN: NAD, AAOx3  HEENT: MMM, no icterus  CV: S1 +ROYCE soft S2, RRR  Lung: CTAB  Abd: soft NT ND +BS  Ext: no c/c/e, no groin hematoma  Neuro: no focal neuro deficit      Vital Signs Last 24 Hrs  T(C): 36.8 (31 Aug 2020 06:11), Max: 36.8 (30 Aug 2020 17:49)  T(F): 98.3 (31 Aug 2020 06:11), Max: 98.3 (30 Aug 2020 17:49)  HR: 64 (31 Aug 2020 06:23) (63 - 82)  BP: 100/58 (31 Aug 2020 06:23) (98/53 - 104/55)  BP(mean): --  RR: 16 (31 Aug 2020 06:23) (16 - 18)  SpO2: 97% (31 Aug 2020 06:23) (95% - 98%)   I&O's Detail    30 Aug 2020 07:01  -  31 Aug 2020 07:00  --------------------------------------------------------  IN:    Oral Fluid: 520 mL  Total IN: 520 mL    OUT:    Voided: 200 mL  Total OUT: 200 mL    Total NET: 320 mL        Daily     Daily Weight in k.2 (31 Aug 2020 06:11)      MEDICATIONS  (STANDING):  aspirin  chewable 81 milliGRAM(s) Oral daily  atorvastatin 80 milliGRAM(s) Oral at bedtime  chlorhexidine 4% Liquid 1 Application(s) Topical <User Schedule>  clopidogrel Tablet 75 milliGRAM(s) Oral daily  enoxaparin Injectable 40 milliGRAM(s) SubCutaneous every 24 hours  furosemide    Tablet 20 milliGRAM(s) Oral every 24 hours  melatonin 5 milliGRAM(s) Oral at bedtime  metoprolol succinate ER 25 milliGRAM(s) Oral every 24 hours  nicotine - 21 mG/24Hr(s) Patch 1 patch Transdermal daily      LABS:                        12.9   7.63  )-----------( 253      ( 31 Aug 2020 05:56 )             38.4         138  |  104  |  22  ----------------------------<  121<H>  4.0   |  23  |  0.84    Ca    9.1      31 Aug 2020 05:56  Mg     2.1         TPro  7.6  /  Alb  3.7  /  TBili  0.6  /  DBili  x   /  AST  68<H>  /  ALT  122<H>  /  AlkPhos  88                  < from: TTE Echo Complete w/o Contrast w/ Doppler (20 @ 09:48) >  CONCLUSIONS:     1. The aortic valve is calcified (morphology not well seen). There is severe aortic stenosis. The peak transvalvular velocity is 3.52 m/s, the mean transvalvular gradient is 35 mmHg, and the LVOT/AV velocity ratio is 0.19. The peak transaortic gradient is 51 mmHg. The aortic valve area (estimated via the continuity method) is 0.6 cm². There is no evidence of aortic regurgitation.   2. Mild mitral regurgitation.   3. The right ventricle is normal in size. Right ventricular systolic function is normal.   4. There is mild concentric left ventricular hypertrophy. Left ventricular systolic function is [id number] reduced with a calculated ejection fraction of 40-45% with regional wall motion abnormalities. There is severe hypokinesis of mid inferolateral, mid anterolateral and basal inferior wall.   5. No pericardial effusion.    < end of copied text >    < from: Xray Chest 1 View- PORTABLE-Routine (20 @ 12:53) >    FINDINGS: Size, mediastinal and hilar contours are unchanged. Persistent pulmonary venous congestive change. No lobar consolidation or pleural collections. No pneumothorax.    IMPRESSION:  Megaly with persistent pulmonary venous congestive changes.    < end of copied text >

## 2020-08-31 NOTE — CHART NOTE - NSCHARTNOTEFT_GEN_A_CORE
Admitting Diagnosis:   Patient is a 55y old  Male who presents with a chief complaint of STEMI (31 Aug 2020 08:08)      PAST MEDICAL & SURGICAL HISTORY:  Hypertension  Hypercholesteremia  Stented coronary artery  S/P appendectomy      Current Nutrition Order:  Regular Diet     PO Intake: Good (%) [   ]  Fair (50-75%) [   ] Poor (<25%) [   ]  Please see Below     GI Issues:   BM8/28+     Pain:  none per flow sheets     Skin Integrity:  No edema   no pressure ulcers; SX site noted   Daquan 21     Labs:   08-31    138  |  104  |  22  ----------------------------<  121<H>  4.0   |  23  |  0.84    Ca    9.1      31 Aug 2020 05:56  Mg     2.1     08-31    TPro  7.6  /  Alb  3.7  /  TBili  0.6  /  DBili  x   /  AST  68<H>  /  ALT  122<H>  /  AlkPhos  88  08-30    CAPILLARY BLOOD GLUCOSE          Medications:  MEDICATIONS  (STANDING):  aspirin  chewable 81 milliGRAM(s) Oral daily  atorvastatin 80 milliGRAM(s) Oral at bedtime  chlorhexidine 4% Liquid 1 Application(s) Topical <User Schedule>  clopidogrel Tablet 75 milliGRAM(s) Oral daily  enoxaparin Injectable 40 milliGRAM(s) SubCutaneous every 24 hours  furosemide    Tablet 20 milliGRAM(s) Oral every 24 hours  melatonin 5 milliGRAM(s) Oral at bedtime  metoprolol succinate ER 25 milliGRAM(s) Oral every 24 hours  nicotine - 21 mG/24Hr(s) Patch 1 patch Transdermal daily  pantoprazole    Tablet 40 milliGRAM(s) Oral before breakfast    MEDICATIONS  (PRN):  acetaminophen   Tablet .. 650 milliGRAM(s) Oral every 6 hours PRN Temp greater or equal to 38C (100.4F), Moderate Pain (4 - 6)  albuterol/ipratropium for Nebulization 3 milliLiter(s) Nebulizer every 6 hours PRN Shortness of Breath and/or Wheezing  benzonatate 100 milliGRAM(s) Oral three times a day PRN Coughing  polyethylene glycol 3350 17 Gram(s) Oral daily PRN Constipation  simethicone 80 milliGRAM(s) Chew daily PRN Gas      Admitted Anthropometrics:  5'3''  pounds +-10%   8/22 165 pounds  BMI 29.3; %KMV=516%     Weight Change:   8/31 165.7 pounds    pounds per pt. No wt changes PTA admitted.     Nutrition Focused Physical Exam: Completed [   ]  Unable to complete [   ]- NA     Estimated energy needs:   IBW used to calculate energy needs due to pt's current body weight exceeding 120% of IBW  Adjusted for age based on current conditions, fluids per team  1400-1680kcal/day 25-30kcal/kg   56-67gm/day 1.0-1.2gm/kg     Subjective:   55 year old male with past medical history significant for CAD (s/p proximal LAD stent in 2018), HLD, 40 pack year smoking history, history of aortic valve stenosis pending aortic valve replacement, presented from his home with left sided chest pain, tightness, diaphoresis, and shortness of breath. He was found to have ST elevations in inferior leads and an elevated trop of 0.88, and was admitted ultimately for STEMI and sent emergently to cath lab. In the cath lab, he was found to have 85% occlusion to his proximal RCA, 75% occlusion to his mid RCA, 50% occlusion in the left main artery, and 75% occlusion to LAD proximal. His OM1 was found to be 100% thrombosed, was determined to be the culprit lesion, and STENT was placed. Pt admitted after the procedure to the CCU for further monitoring. BERKLEY with Noted LVEF 40-45%, CXRs showing pulmonary edema, fluid congestion.   Pt had been pending d/c 8/25 however, became short of breath, diaphoretic and experienced a pressure-like chest pain after straining while using the bathroom. Pt hypotensive, BP dropped to 60s/40s and became bradycardic; CT surgery re-consulted to consider performing AVR while inpatient rather than post d/c due to recent event occurrence. Noted plan for AVR/CABG s/p "cool down" - planned for within the next few weeks.   Pt visited now on 5UR, alert in bed. Pt ordered for regular diet at this time. Pt reports he did not have breakfast today, reports he usually does not consume breakfast meal. Does report eating well at dinner last night - had veggies burger, poaotoes and fruit salad.   Pt reports he is Pending D/C today. Please see below for nutritions recommendations if pt is now to Be D/C.      Nutrition Diagnosis: Inadequate oral intake RT decreased desire for meals AEB decreased PO intake  RESOLVED    Goal: Pt will meet at least 75% of nutrient needs     Recommendations:  1. Recommend DASH TLC diet.  2. Monitor %PO intake.   3. Monitor Labs: monitor BMP, CBC, glucose, lytes, trend renal indices, POCT.  4. Monitor Skin, GI, Wts, GOC.  5. RD to remain available for additional nutrition interventions as needed.     Education: Additional diet education provided - Discussed benefit of not skipping meals, tips to prevent eating out of boredom provided. Understandings stated, provide additional motivation as needed.     Risk Level: High [  ] Moderate [ X  ] Low [   ].

## 2020-08-31 NOTE — PROGRESS NOTE ADULT - ASSESSMENT
Assessment: 56 y/o M with CAD, severe AS, smoker, HLD who presented with STEMI s/p STEPHENIE to OM1    STEMI s/p PCI  -c/w ASA 81mg and plavix (switched from brilinta given surgery)  - discuss with Dr. Hutson if ok with plavix given situation   -c/w lipitor  -LVEF ~40-45% after STEMI    Severe AS  -Multi-disciplinary heart team discussion regarding timing based on symptoms  -he is best a candidate for SAVR with concomitant CABG  -Plan for surgery with Dr. Mitchell after 'cool off' period.  Discussed to send home and follow up in clinic within 1 wk with plan for AVR/CABG in the next couple weeks.    CHF (systolic heart failure, chronic)  -LVEF 40%  -c/w GDMT as tolerated (BP on low side with just metoprolol)    Reflux  -pt complaining of reflux like symptoms   -start PPI    Dispo- home if team comfortable with discharge with follow up  -Patient to see myself (Dr. Landers) and Dr. Mitchell next monday in clinic.

## 2020-09-02 ENCOUNTER — APPOINTMENT (OUTPATIENT)
Dept: CARDIOTHORACIC SURGERY | Facility: CLINIC | Age: 55
End: 2020-09-02
Payer: COMMERCIAL

## 2020-09-03 DIAGNOSIS — J18.9 PNEUMONIA, UNSPECIFIED ORGANISM: ICD-10-CM

## 2020-09-03 DIAGNOSIS — I50.23 ACUTE ON CHRONIC SYSTOLIC (CONGESTIVE) HEART FAILURE: ICD-10-CM

## 2020-09-03 DIAGNOSIS — I21.19 ST ELEVATION (STEMI) MYOCARDIAL INFARCTION INVOLVING OTHER CORONARY ARTERY OF INFERIOR WALL: ICD-10-CM

## 2020-09-03 DIAGNOSIS — K59.00 CONSTIPATION, UNSPECIFIED: ICD-10-CM

## 2020-09-03 DIAGNOSIS — R00.1 BRADYCARDIA, UNSPECIFIED: ICD-10-CM

## 2020-09-03 DIAGNOSIS — J96.00 ACUTE RESPIRATORY FAILURE, UNSPECIFIED WHETHER WITH HYPOXIA OR HYPERCAPNIA: ICD-10-CM

## 2020-09-03 DIAGNOSIS — J44.0 CHRONIC OBSTRUCTIVE PULMONARY DISEASE WITH (ACUTE) LOWER RESPIRATORY INFECTION: ICD-10-CM

## 2020-09-03 DIAGNOSIS — Z82.49 FAMILY HISTORY OF ISCHEMIC HEART DISEASE AND OTHER DISEASES OF THE CIRCULATORY SYSTEM: ICD-10-CM

## 2020-09-03 DIAGNOSIS — Q23.1 CONGENITAL INSUFFICIENCY OF AORTIC VALVE: ICD-10-CM

## 2020-09-03 DIAGNOSIS — F17.210 NICOTINE DEPENDENCE, CIGARETTES, UNCOMPLICATED: ICD-10-CM

## 2020-09-03 DIAGNOSIS — I25.10 ATHEROSCLEROTIC HEART DISEASE OF NATIVE CORONARY ARTERY WITHOUT ANGINA PECTORIS: ICD-10-CM

## 2020-09-03 DIAGNOSIS — I21.3 ST ELEVATION (STEMI) MYOCARDIAL INFARCTION OF UNSPECIFIED SITE: ICD-10-CM

## 2020-09-03 DIAGNOSIS — I11.0 HYPERTENSIVE HEART DISEASE WITH HEART FAILURE: ICD-10-CM

## 2020-09-03 DIAGNOSIS — E78.5 HYPERLIPIDEMIA, UNSPECIFIED: ICD-10-CM

## 2020-09-03 DIAGNOSIS — I95.9 HYPOTENSION, UNSPECIFIED: ICD-10-CM

## 2020-09-04 PROCEDURE — C1894: CPT

## 2020-09-04 PROCEDURE — 84100 ASSAY OF PHOSPHORUS: CPT

## 2020-09-04 PROCEDURE — 83605 ASSAY OF LACTIC ACID: CPT

## 2020-09-04 PROCEDURE — 82803 BLOOD GASES ANY COMBINATION: CPT

## 2020-09-04 PROCEDURE — 96374 THER/PROPH/DIAG INJ IV PUSH: CPT

## 2020-09-04 PROCEDURE — 93005 ELECTROCARDIOGRAM TRACING: CPT

## 2020-09-04 PROCEDURE — 84145 PROCALCITONIN (PCT): CPT

## 2020-09-04 PROCEDURE — 97162 PT EVAL MOD COMPLEX 30 MIN: CPT

## 2020-09-04 PROCEDURE — 84484 ASSAY OF TROPONIN QUANT: CPT

## 2020-09-04 PROCEDURE — 36415 COLL VENOUS BLD VENIPUNCTURE: CPT

## 2020-09-04 PROCEDURE — 71046 X-RAY EXAM CHEST 2 VIEWS: CPT

## 2020-09-04 PROCEDURE — 84295 ASSAY OF SERUM SODIUM: CPT

## 2020-09-04 PROCEDURE — 86901 BLOOD TYPING SEROLOGIC RH(D): CPT

## 2020-09-04 PROCEDURE — 97116 GAIT TRAINING THERAPY: CPT

## 2020-09-04 PROCEDURE — 75573 CT HRT C+ STRUX CGEN HRT DS: CPT

## 2020-09-04 PROCEDURE — C1769: CPT

## 2020-09-04 PROCEDURE — 87040 BLOOD CULTURE FOR BACTERIA: CPT

## 2020-09-04 PROCEDURE — 71045 X-RAY EXAM CHEST 1 VIEW: CPT

## 2020-09-04 PROCEDURE — 93880 EXTRACRANIAL BILAT STUDY: CPT

## 2020-09-04 PROCEDURE — 85027 COMPLETE CBC AUTOMATED: CPT

## 2020-09-04 PROCEDURE — 85730 THROMBOPLASTIN TIME PARTIAL: CPT

## 2020-09-04 PROCEDURE — 87635 SARS-COV-2 COVID-19 AMP PRB: CPT

## 2020-09-04 PROCEDURE — 82550 ASSAY OF CK (CPK): CPT

## 2020-09-04 PROCEDURE — 70450 CT HEAD/BRAIN W/O DYE: CPT

## 2020-09-04 PROCEDURE — 82330 ASSAY OF CALCIUM: CPT

## 2020-09-04 PROCEDURE — 83690 ASSAY OF LIPASE: CPT

## 2020-09-04 PROCEDURE — 94640 AIRWAY INHALATION TREATMENT: CPT

## 2020-09-04 PROCEDURE — 82553 CREATINE MB FRACTION: CPT

## 2020-09-04 PROCEDURE — 93306 TTE W/DOPPLER COMPLETE: CPT

## 2020-09-04 PROCEDURE — 80048 BASIC METABOLIC PNL TOTAL CA: CPT

## 2020-09-04 PROCEDURE — 99285 EMERGENCY DEPT VISIT HI MDM: CPT | Mod: 25

## 2020-09-04 PROCEDURE — 86803 HEPATITIS C AB TEST: CPT

## 2020-09-04 PROCEDURE — C1874: CPT

## 2020-09-04 PROCEDURE — 94150 VITAL CAPACITY TEST: CPT

## 2020-09-04 PROCEDURE — 86769 SARS-COV-2 COVID-19 ANTIBODY: CPT

## 2020-09-04 PROCEDURE — 81001 URINALYSIS AUTO W/SCOPE: CPT

## 2020-09-04 PROCEDURE — 87641 MR-STAPH DNA AMP PROBE: CPT

## 2020-09-04 PROCEDURE — C1887: CPT

## 2020-09-04 PROCEDURE — 85610 PROTHROMBIN TIME: CPT

## 2020-09-04 PROCEDURE — 86850 RBC ANTIBODY SCREEN: CPT

## 2020-09-04 PROCEDURE — 74174 CTA ABD&PLVS W/CONTRAST: CPT

## 2020-09-04 PROCEDURE — 80061 LIPID PANEL: CPT

## 2020-09-04 PROCEDURE — 84443 ASSAY THYROID STIM HORMONE: CPT

## 2020-09-04 PROCEDURE — 83735 ASSAY OF MAGNESIUM: CPT

## 2020-09-04 PROCEDURE — 85025 COMPLETE CBC W/AUTO DIFF WBC: CPT

## 2020-09-04 PROCEDURE — 83036 HEMOGLOBIN GLYCOSYLATED A1C: CPT

## 2020-09-04 PROCEDURE — 84132 ASSAY OF SERUM POTASSIUM: CPT

## 2020-09-04 PROCEDURE — 80053 COMPREHEN METABOLIC PANEL: CPT

## 2020-09-04 PROCEDURE — 82962 GLUCOSE BLOOD TEST: CPT

## 2020-09-04 PROCEDURE — C1725: CPT

## 2020-09-04 PROCEDURE — 83880 ASSAY OF NATRIURETIC PEPTIDE: CPT

## 2020-09-08 ENCOUNTER — APPOINTMENT (OUTPATIENT)
Dept: CARDIOTHORACIC SURGERY | Facility: CLINIC | Age: 55
End: 2020-09-08
Payer: COMMERCIAL

## 2020-09-08 ENCOUNTER — APPOINTMENT (OUTPATIENT)
Dept: CARDIOTHORACIC SURGERY | Facility: CLINIC | Age: 55
End: 2020-09-08

## 2020-09-08 VITALS
RESPIRATION RATE: 18 BRPM | SYSTOLIC BLOOD PRESSURE: 106 MMHG | HEART RATE: 78 BPM | TEMPERATURE: 96.9 F | OXYGEN SATURATION: 97 % | HEIGHT: 63 IN | DIASTOLIC BLOOD PRESSURE: 55 MMHG | WEIGHT: 163 LBS | BODY MASS INDEX: 28.88 KG/M2

## 2020-09-08 DIAGNOSIS — Z78.9 OTHER SPECIFIED HEALTH STATUS: ICD-10-CM

## 2020-09-08 DIAGNOSIS — Q23.1 CONGENITAL INSUFFICIENCY OF AORTIC VALVE: ICD-10-CM

## 2020-09-08 DIAGNOSIS — E78.5 HYPERLIPIDEMIA, UNSPECIFIED: ICD-10-CM

## 2020-09-08 DIAGNOSIS — Z82.49 FAMILY HISTORY OF ISCHEMIC HEART DISEASE AND OTHER DISEASES OF THE CIRCULATORY SYSTEM: ICD-10-CM

## 2020-09-08 DIAGNOSIS — I35.0 NONRHEUMATIC AORTIC (VALVE) STENOSIS: ICD-10-CM

## 2020-09-08 DIAGNOSIS — I50.9 HEART FAILURE, UNSPECIFIED: ICD-10-CM

## 2020-09-08 DIAGNOSIS — I25.10 ATHEROSCLEROTIC HEART DISEASE OF NATIVE CORONARY ARTERY W/OUT ANGINA PECTORIS: ICD-10-CM

## 2020-09-08 DIAGNOSIS — Z98.61 ATHEROSCLEROTIC HEART DISEASE OF NATIVE CORONARY ARTERY W/OUT ANGINA PECTORIS: ICD-10-CM

## 2020-09-08 PROCEDURE — 99215 OFFICE O/P EST HI 40 MIN: CPT

## 2020-09-08 RX ORDER — CLOPIDOGREL BISULFATE 75 MG/1
75 TABLET, FILM COATED ORAL DAILY
Refills: 0 | Status: ACTIVE | COMMUNITY
Start: 2020-09-08

## 2020-09-08 RX ORDER — PANTOPRAZOLE 40 MG/1
40 TABLET, DELAYED RELEASE ORAL DAILY
Qty: 30 | Refills: 2 | Status: ACTIVE | COMMUNITY
Start: 2020-09-08

## 2020-09-08 RX ORDER — METOPROLOL SUCCINATE 25 MG/1
25 TABLET, EXTENDED RELEASE ORAL DAILY
Qty: 30 | Refills: 3 | Status: ACTIVE | COMMUNITY
Start: 2020-09-08

## 2020-09-08 RX ORDER — FUROSEMIDE 20 MG/1
20 TABLET ORAL
Qty: 30 | Refills: 0 | Status: ACTIVE | COMMUNITY
Start: 2020-09-08

## 2020-09-08 RX ORDER — TICAGRELOR 90 MG/1
90 TABLET ORAL TWICE DAILY
Refills: 0 | Status: DISCONTINUED | COMMUNITY
Start: 2020-08-27 | End: 2020-09-08

## 2020-09-08 NOTE — REVIEW OF SYSTEMS
[Feeling Tired] : feeling tired [Feeling Poorly] : feeling poorly [SOB on Exertion] : shortness of breath during exertion [Negative] : Heme/Lymph

## 2020-09-14 DIAGNOSIS — Z01.818 ENCOUNTER FOR OTHER PREPROCEDURAL EXAMINATION: ICD-10-CM

## 2020-09-15 NOTE — PHYSICAL EXAM
[Extraocular Movements] : extraocular movements were intact [PERRL With Normal Accommodation] : pupils were equal in size, round, and reactive to light [Sclera] : the sclera and conjunctiva were normal [Neck Appearance] : the appearance of the neck was normal [Jugular Venous Distention Increased] : there was no jugular-venous distention [Neck Cervical Mass (___cm)] : no neck mass was observed [Thyroid Nodule] : there were no palpable thyroid nodules [Thyroid Diffuse Enlargement] : the thyroid was not enlarged [Rhythm Regular] : regular [Auscultation Breath Sounds / Voice Sounds] : lungs were clear to auscultation bilaterally [Normal Rate] : normal [Normal S1] : normal S1 [Normal S2] : normal S2 [III] : a grade 3 [Examination Of The Chest] : the chest was normal in appearance [Diminished Respiratory Excursion] : normal chest expansion [Chest Visual Inspection Thoracic Asymmetry] : no chest asymmetry [2+] : left 2+ [Bowel Sounds] : normal bowel sounds [Abdomen Tenderness] : non-tender [Abdomen Mass (___ Cm)] : no abdominal mass palpated [Abdomen Soft] : soft [Cervical Lymph Nodes Enlarged Posterior Bilaterally] : posterior cervical [No CVA Tenderness] : no ~M costovertebral angle tenderness [Abnormal Walk] : normal gait [No Spinal Tenderness] : no spinal tenderness [Motor Tone] : muscle strength and tone were normal [Nail Clubbing] : no clubbing  or cyanosis of the fingernails [Musculoskeletal - Swelling] : no joint swelling seen [Skin Color & Pigmentation] : normal skin color and pigmentation [Skin Turgor] : normal skin turgor [Deep Tendon Reflexes (DTR)] : deep tendon reflexes were 2+ and symmetric [Sensation] : the sensory exam was normal to light touch and pinprick [] : no rash [Oriented To Time, Place, And Person] : oriented to person, place, and time [No Focal Deficits] : no focal deficits [Impaired Insight] : insight and judgment were intact [Affect] : the affect was normal [FreeTextEntry1] : deferred

## 2020-09-15 NOTE — HISTORY OF PRESENT ILLNESS
[FreeTextEntry1] : 55 year old male, ex-smoker (30yr PPD, quit 8/22/20), with a past medical history of hyperlipidemia, CAD (proximal LAD stent in 2018), recent STEMI 8/21 s/p STEPHENIE to OM1,chronic systolic heart failure, known bicuspid aortic valve with aortic stenosis presents for surgical consult for multivessel CAD and valvular disease. \par \par Patient presented to [facility] on 8/22/20 with new onset chest pain and shortness of breath associated with diaphoresis. Patient found to have ST elevations in inferior leads and an elevated trop of 0.88. He was admitted for STEMI and sent to cath lab where he was found to have 85% occlusion to proximal RCA, 75% occlusion to mid RCA, 50% occlusion in the left main artery, and 75% occlusion to LAD proximal. His OM1 was found to be 100% thrombosed, was determined to be the culprit lesion, and STENT was placed. He was admitted after the procedure to the CCU for further monitoring. On admission, pt found to be febrile, presumed to secondary from LLL PNA for which he was treated w/Ceftriaxone and \par Azithromycin (end 8/26). ECHO 8/22/20 performed revealing severe AS- aortic valve area 0.6cm; AV mean/peak gradients 35/51mmHg.trace aortic regurgitation. EF 40-45%. There is severe hypokinesis of mid inferolateral, mid anterolateral and basal inferior wall. No pericardial effusion. No evidence of pulmonary hypertension. \par \par The patient was discharged home on 8/31/20 on Aspirin and Plavix. He has completed preop workup and returns today for surgical planning. \par

## 2020-09-15 NOTE — ASSESSMENT
[FreeTextEntry1] : 55 year old male, ex-smoker (30yr PPD, quit 8/22/20), with a past medical history of hyperlipidemia, CAD (proximal LAD stent in 2018), recent STEMI 8/21 s/p STEPHENIE to OM1,chronic systolic heart failure, known bicuspid aortic valve with aortic stenosis presents for surgical consult for multivessel CAD and valvular disease. \par \par Plan:\par I have discussed the indications for surgery which include: decrease ejection fraction and worsening aortic stenosis on echocardiogram, signs and symptoms of congestive heart failure, and other necessary cardiac procedures such as coronary artery bypass grafting.\par \par Given his symptoms of shortness of breath, recent admission for systolic congestive heart failure and his  severe aortic stenosis and multivessel CAD, he  meets those indications. \par \par I had a lengthy discussion with the patient regarding his valvular and coronary disease and progression. I have recommended that the patient is a candidate for coronary artery bypass grafting and aortic valve replacement (Inspira). \par \par The patient was educated on various valve options.  I have described the mechanical valve prosthesis which does require Coumadin therapy with a daily pill as well as frequent lab tests. The mechanical valve has excellent longevity and is usually only removed in the cases of infective bacterial prosthetic valve endocarditis or pannus formation. Approximately over 90% of mechanical valves never need to be removed. However, there is a risk with Coumadin, a blood thinner, approximately a 1% thromboembolic risk per year. The On-x mechanical valve was also discussed, which requires a lower Coumadin dosage and INR therapeutic range. \par \par The other valve option is a biological valve. In general, approximately 50% of these need to be removed at approximately 15 years. However, these valves do not require Coumadin therapy and, therefore, do not have the associated risks of the blood thinner. I discussed that with the current use of Transcatheter Aortic Valve Replacement (TAVR), there is a possibility that future replacement of a failing bioprosthetic valve by TAVR may be an option. The Inspira and MagnaEase valves and their morphology fitted for future TAVRs was discussed as well. \par \par The patient has chosen a bioprosthetic aortic valve. \par \par I have discussed the risks, benefits and alternatives to surgery. I have explained the risks of the surgery, including approximately 1-2% major mortality or morbidity including stroke, infection, bleeding, death, renal failure and heart attack. All questions were addressed and patient agrees to proceed with surgery. \par \par \par --The patient is a candidate for aortic valve replacement, coronary artery bypass grafting.\par --The patient will continue on Aspirin and Plavix until the day prior to surgery.\par --Surgery date is scheduled for 9/24/20. \par --The patient will return on 9/21 for covid test, repeat labs, EKG and chest xray.\par

## 2020-09-15 NOTE — END OF VISIT
[FreeTextEntry3] : \par I, TERRENCE SHAH , am scribing for and in the presence of LEIA LEI the following sections: History of present illness, past Medical/family/surgical/family/social history, review of systems, vital signs, physical exam and disposition.\par \par I personally performed the services described in the documentation, reviewed the documentation recorded by the scribe in my presence and it accurately and completely records my words and actions.\par \par \par I personally performed the services described in the documentation, reviewed the documentation recorded by the scribe in my presence and it accurately and completely records my words and actions.\par \par \par

## 2020-09-15 NOTE — DATA REVIEWED
[FreeTextEntry1] : \par \par EXAM:  ECHO TTE WO CON COMP W DOPP                      \par \par PROCEDURE DATE:  08/26/2020  \par \par \par \par INTERPRETATION:  REPORT:\par -----------------------------------\par TRANSTHORACIC ECHOCARDIOGRAM REPORT\par \par \par --------------------------------------------------------------------------------\par Patient Name:   DHARMESH DAS Date of Exam:        8/26/2020\par Medical Rec #:  [id number]        Height/Weight:       63.0 in / 165.34 lb\par Account #:      [id number]        BSA:                 1.78 m²\par YOB: 1965      BP:                  111/86 mmHg\par Patient Age:    55 years       HR:                  87 bpm\par Patient Gender: M              Sonographer:         MELISSA NATHANIT\par                                Attending:           Jessy Mckinney MD\par                                Referring Physician: EVELYN CORREA\[facility] \par \par --------------------------------------------------------------------------------\par CPT:               ECHO TTE WO CON COMP W DOPP - 33774; - [id number].m\par Indication(s):     R01.1 - Cardiac murmur, unspecified\par Procedure:         A complete two-dimensional transthoracic echocardiogram was\par                    performed: 2D, M-mode, spectral and color flow Doppler.\par Ordering Location: [facility] \par Study Quality:     Study quality was good.\par \par \par --------------------------------------------------------------------------------\par CONCLUSIONS:\par \par  1. There is severe aortic stenosis. The peak transvalvular velocity is 3.52 m/s, the mean transvalvular gradient is 35 mmHg, and the LVOT/AV velocity ratio is 0.19. The peak transaortic gradient is 51 mmHg. The aortic valve area (estimated via the continuity method) is 0.6 cm². There is trace aortic regurgitation.\par  2. Mild mitral regurgitation.\par  3. The right ventricle is normal in size. Right ventricular systolic function is normal.\par  4. There is mild concentric left ventricular hypertrophy. Left ventricular systolic function is [id number] reduced with a calculated ejection fraction of 40-45% with regional wall motion abnormalities. There is severe hypokinesis of mid inferolateral, mid anterolateral and basal inferior wall.\par  5. No pericardial effusion.\par  6. No evidence of pulmonary hypertension.\par  7. Compared to the previous TTE performed on 8/22/2020, there have been no significant interval changes.\par

## 2020-09-15 NOTE — CONSULT LETTER
[FreeTextEntry2] : Agustin Gracia MD [FreeTextEntry1] : \par I had the pleasure of seeing your patient, DHARMESH DAS, in my office today. \par \par We take a multidisciplinary team approach to patient care and consider you, the referring physician, an extension of our team. We will maintain an open line of communication with you throughout your patient's treatment course.  \par \par DHARMESH DAS  is being evaluated for severe aortic stenosis and coronary artery disease. I have reviewed all of the patient's medical records and diagnostic images at the time of his office consultation. I have enclosed a copy for your records. \par \par I have reviewed the indications for surgery,and used our webpage [email or web address] <[email or web address]> to illustrate the aorta and anatomy of the heart. The patient meets criteria for surgery. I have recommended that the patient is a candidate for an Aortic Valve Replacement and coronary artery bypass grafting.\par \par Surgery is scheduled for 9/24/20. I will update you on his perioperative status and his disposition upon discharge. \par \par I appreciate the opportunity to care for your patient at the [facility] based at [facility]. If there are any questions or concerns, please call me directly at [phone]. \par \par \par Sincerely, \par \par \par \par \par \par \par \par Marco Antonio Mitchell M.D.\par Professor of Cardiovascular and Thoracic Surgery\par Minimally Invasive Valve Surgeon\par Director of Aortic Surgery, [facility]\par Cell: [phone]\par Email: [email or web address] \par \par [facility]:\par [address], [address], [place] [address]\par Office: [phone]\par Fax: [phone]\par \par [facility]:\par Department of Cardiovascular and Thoracic Surgery\par [address] [place], [place], [address]\par Office: [phone]\par Fax: [phone]\par \par Practice Manager: Ms. Chayito Wong\par Email: [email or web address]\par Phone: [phone]\par \par \par

## 2020-09-24 ENCOUNTER — APPOINTMENT (OUTPATIENT)
Dept: CARDIOTHORACIC SURGERY | Facility: HOSPITAL | Age: 55
End: 2020-09-24

## 2021-03-15 ENCOUNTER — APPOINTMENT (OUTPATIENT)
Dept: PULMONOLOGY | Facility: CLINIC | Age: 56
End: 2021-03-15
Payer: MEDICAID

## 2021-03-15 VITALS
HEIGHT: 63 IN | WEIGHT: 175 LBS | TEMPERATURE: 96.9 F | DIASTOLIC BLOOD PRESSURE: 80 MMHG | OXYGEN SATURATION: 98 % | BODY MASS INDEX: 31.01 KG/M2 | HEART RATE: 82 BPM | SYSTOLIC BLOOD PRESSURE: 116 MMHG

## 2021-03-15 PROCEDURE — 99204 OFFICE O/P NEW MOD 45 MIN: CPT | Mod: 25

## 2021-03-15 PROCEDURE — 71046 X-RAY EXAM CHEST 2 VIEWS: CPT

## 2021-03-15 PROCEDURE — 99072 ADDL SUPL MATRL&STAF TM PHE: CPT

## 2021-03-15 NOTE — PHYSICAL EXAM
[No Acute Distress] : no acute distress [Normal Appearance] : normal appearance [Normal Rate/Rhythm] : normal rate/rhythm [Normal S1, S2] : normal s1, s2 [No Murmurs] : no murmurs [No Resp Distress] : no resp distress [Clear to Auscultation Bilaterally] : clear to auscultation bilaterally [No Clubbing] : no clubbing [No Edema] : no edema [Normal Color/ Pigmentation] : normal color/ pigmentation [Normal Affect] : normal affect

## 2021-03-15 NOTE — ASSESSMENT
[FreeTextEntry1] : Data reviewed:\par \par PA/lat CXR in office 03/15/2021 : clear lungs, sternotomy, AVR, clear lung parenchyma\par \par Impression:\par Recent former smoker w CAD\par \par Plan:\par He doesn't need anything at present.\par He is eligible for LDCT screening and we will enroll him in August.\par After that scan he can see me and we will do a PFT given his heavy smoking hx and give him pneumovax at that time (so as not to interfere w Covid vax).

## 2022-04-08 ENCOUNTER — APPOINTMENT (OUTPATIENT)
Dept: UROLOGY | Facility: CLINIC | Age: 57
End: 2022-04-08
Payer: MEDICAID

## 2022-04-08 VITALS — TEMPERATURE: 97.1 F | SYSTOLIC BLOOD PRESSURE: 123 MMHG | DIASTOLIC BLOOD PRESSURE: 70 MMHG | HEART RATE: 84 BPM

## 2022-04-08 VITALS — HEART RATE: 72 BPM | DIASTOLIC BLOOD PRESSURE: 71 MMHG | SYSTOLIC BLOOD PRESSURE: 138 MMHG | TEMPERATURE: 97.1 F

## 2022-04-08 PROCEDURE — 99214 OFFICE O/P EST MOD 30 MIN: CPT

## 2022-04-08 NOTE — ASSESSMENT
[FreeTextEntry1] : Diagnosis: LUTS, renal cyst, hematospermia\par \par Plan:\par PSA today, check UCx\par TREVOR normal\par need to send US results to practice after completed\par \par RTC in 1 year \par \par

## 2022-04-08 NOTE — HISTORY OF PRESENT ILLNESS
[Dysuria] : dysuria [FreeTextEntry1] : Dr. Agustin Lowry (cardiologist)\par [address]\par [address]\[place] [address]\par \par \par CC: Dysuria and Hematospermia\par \par Last seen in 2020 for terminal dysuria, split stream, and occasional straining.  \par PSA 1.0 7/2020\par UCX negative from 2020\par \par Recently had CT chest completed by cardiologist: partially-imaged hypoattenuating focus in the left kidney, incompletely evaluated, possible cyst.\par Going for Renal US ordered by his cardiologist \par \par Now he reports 2 weeks of occasional dysuria and ongoing intermittent hematospermia since his cardiac surgery in 2020.  last episode of hematospermia was few weeks ago.\par \par He is concerned because he gets "flashes of temperature increasing for 10 minute." Since his Gallbladder surgery 9/2021 Was told because of this he may have an infection\par \par \par Uroflow in 2020: \par  cc\par Qmax 8.9 cc/s\par Qaverage 4.1 cc/s\par PVR: 35 cc \par \par Anticoagulation: ASA 81 mg\par All: NKDA\par Social: restaurant LES, , no kids\par PMHx: CAD s/p cardiac stents 2020; triple bypass and aortic valve replacement 10/2020, cholecystectomy \par FHx: father passed away from MI, familial hyperlipidemia, \par PSHx: cardiac stent placement, no family history of  malignancy\par  [Urinary Incontinence] : no urinary incontinence [Urinary Retention] : no urinary retention [Urinary Urgency] : no urinary urgency [Urinary Frequency] : no urinary frequency [Nocturia] : no nocturia [Straining] : no straining [Weak Stream] : no weak stream [Intermittency] : no intermittency [Post-Void Dribbling] : no post-void dribbling [Hematuria - Gross] : no gross hematuria [Hematuria - Microscopic] : no microscopic hematuria [Bladder Spasm] : no bladder spasm [Abdominal Pain] : no abdominal pain [Flank Pain] : no flank pain

## 2022-04-08 NOTE — PHYSICAL EXAM
[General Appearance - Well Developed] : well developed [General Appearance - Well Nourished] : well nourished [Normal Appearance] : normal appearance [Well Groomed] : well groomed [General Appearance - In No Acute Distress] : no acute distress [Edema] : no peripheral edema [] : no respiratory distress [Respiration, Rhythm And Depth] : normal respiratory rhythm and effort [Exaggerated Use Of Accessory Muscles For Inspiration] : no accessory muscle use [Abdomen Soft] : soft [Abdomen Tenderness] : non-tender [Costovertebral Angle Tenderness] : no ~M costovertebral angle tenderness [Urethral Meatus] : meatus normal [Penis Abnormality] : normal circumcised penis [Urinary Bladder Findings] : the bladder was normal on palpation [Testes Tenderness] : no tenderness of the testes [Testes Mass (___cm)] : there were no testicular masses [Prostate Tenderness] : the prostate was not tender [No Prostate Nodules] : no prostate nodules [Prostate Size ___ gm] : prostate size [unfilled] gm [No Focal Deficits] : no focal deficits [Oriented To Time, Place, And Person] : oriented to person, place, and time [Affect] : the affect was normal [Mood] : the mood was normal [Not Anxious] : not anxious

## 2022-04-11 ENCOUNTER — NON-APPOINTMENT (OUTPATIENT)
Age: 57
End: 2022-04-11

## 2022-04-11 LAB
BACTERIA UR CULT: NORMAL
PSA SERPL-MCNC: 1.76 NG/ML

## 2022-05-02 ENCOUNTER — APPOINTMENT (OUTPATIENT)
Dept: PULMONOLOGY | Facility: CLINIC | Age: 57
End: 2022-05-02
Payer: MEDICAID

## 2022-05-02 VITALS
DIASTOLIC BLOOD PRESSURE: 70 MMHG | SYSTOLIC BLOOD PRESSURE: 110 MMHG | HEIGHT: 63 IN | OXYGEN SATURATION: 97 % | HEART RATE: 81 BPM | TEMPERATURE: 97.2 F | BODY MASS INDEX: 31.89 KG/M2 | WEIGHT: 180 LBS

## 2022-05-02 DIAGNOSIS — Z23 ENCOUNTER FOR IMMUNIZATION: ICD-10-CM

## 2022-05-02 DIAGNOSIS — R06.00 DYSPNEA, UNSPECIFIED: ICD-10-CM

## 2022-05-02 DIAGNOSIS — F17.200 NICOTINE DEPENDENCE, UNSPECIFIED, UNCOMPLICATED: ICD-10-CM

## 2022-05-02 DIAGNOSIS — R06.89 DYSPNEA, UNSPECIFIED: ICD-10-CM

## 2022-05-02 PROCEDURE — 99213 OFFICE O/P EST LOW 20 MIN: CPT

## 2022-05-02 NOTE — PHYSICAL EXAM
[No Acute Distress] : no acute distress [Normal Rate/Rhythm] : normal rate/rhythm [Normal S1, S2] : normal s1, s2 [No Murmurs] : no murmurs [No Resp Distress] : no resp distress [Clear to Auscultation Bilaterally] : clear to auscultation bilaterally [No Edema] : no edema

## 2022-05-04 LAB — SARS-COV-2 N GENE NPH QL NAA+PROBE: NOT DETECTED

## 2022-05-05 ENCOUNTER — APPOINTMENT (OUTPATIENT)
Dept: PULMONOLOGY | Facility: CLINIC | Age: 57
End: 2022-05-05
Payer: MEDICAID

## 2022-05-05 PROBLEM — Z23 ENCOUNTER FOR IMMUNIZATION: Status: ACTIVE | Noted: 2022-05-05

## 2022-05-05 PROCEDURE — 94727 GAS DIL/WSHOT DETER LNG VOL: CPT

## 2022-05-05 PROCEDURE — 90471 IMMUNIZATION ADMIN: CPT

## 2022-05-05 PROCEDURE — 90677 PCV20 VACCINE IM: CPT

## 2022-05-05 PROCEDURE — 94729 DIFFUSING CAPACITY: CPT

## 2022-05-05 PROCEDURE — 94060 EVALUATION OF WHEEZING: CPT

## 2022-05-23 ENCOUNTER — NON-APPOINTMENT (OUTPATIENT)
Age: 57
End: 2022-05-23

## 2022-06-20 ENCOUNTER — NON-APPOINTMENT (OUTPATIENT)
Age: 57
End: 2022-06-20

## 2022-06-20 LAB — BACTERIA UR CULT: NORMAL

## 2022-10-21 NOTE — HISTORY OF PRESENT ILLNESS
[TextBox_4] : 03/15/2021: Self-referred for lung check. Hosp in 8/2020 for STEMI, had a stent, had LLL pna at that time, discharged and underwent bovine AVR and 3VCABG at [facility], Dr Filipe Diaz. He recalls that I saw him during that admission and advised LDCT screening and pneumovax, but there's no indication in the chart that I or the pulm service saw him. He comes in today because he's a 1-2ppd former smoker teens up until August, and also because he's been having hot flashes and he wonders if he has pneumonia again, and does he need the pneumonia shot. He's sometimes dyspneic, for instance if he carries things up to his 3rd floor apartment. Has had his first Covid vaccine, 2nd one scheduled for next week.\par \par 5/2/22: He had mild Covid Jan 2022 and was not hospitalized. Had some myalgias, fatigue. He had chest pain on breathing that has recovered. He continues to have chest pain with movement, which has improved. His cardiologist says he is fine. He also feels more dyspneic. He had a CT of the chest in Jan in [place] and was told this showed some damage to the lungs. He was not given any treatment. He also had a CT chest here in [facility] at a [facility]. He believes this did not show a problem in the lung. Last time he smoked was within past month.

## 2022-10-21 NOTE — ASSESSMENT
[FreeTextEntry1] : Data reviewed:\par \par PA/lat CXR in office 03/15/2021 : clear lungs, sternotomy, AVR, clear lung parenchyma\par \par Impression:\par Dyspnea\par Smoker w CAD\par Covid Jan 2022\par \par Plan:\par Will bring him back this week for PFT. Could have COPD, post Covid ILD, or no lung disease.\par In the meantime he will pls track down this [place] CT chest.\par He does need to be enrolled in LDCT when appropriate.\par --\par PFT 5/5/22: normal clovis, no BD response, TLC 77%, DLCO 58%\par He provides report from CT chest [facility] 3/30/22 referencing minimal cystic/emphysema changes apically, and then a 5mm nodule.\par No sig lung disease. Just needs to enroll in LDCT here in March 2023.\par --\par Addended to note that [id number] was given 5/5/22 due to his preexisting conditions.

## 2023-04-12 NOTE — H&P ADULT - NSICDXPASTMEDICALHX_GEN_ALL_CORE_FT
PAST MEDICAL HISTORY:  Hypercholesteremia     Hypertension     Stented coronary artery Pt lives in an apt, alone, 1 flight of steps to apt. Pt reports being independent w/ all ADLs, req assistance w/ some IADLs (groceries, laundry). Pt reports amb w/ rollator at all times. Pt owns rollator x2, straight cane, grab bars. Pt reports x8 falls in past 12 months due to LOB w/ head turns.

## 2023-05-30 ENCOUNTER — APPOINTMENT (OUTPATIENT)
Dept: UROLOGY | Facility: CLINIC | Age: 58
End: 2023-05-30
Payer: MEDICAID

## 2023-05-30 VITALS
TEMPERATURE: 97.9 F | HEART RATE: 67 BPM | SYSTOLIC BLOOD PRESSURE: 111 MMHG | BODY MASS INDEX: 31.89 KG/M2 | HEIGHT: 63 IN | OXYGEN SATURATION: 100 % | DIASTOLIC BLOOD PRESSURE: 67 MMHG | WEIGHT: 180 LBS

## 2023-05-30 DIAGNOSIS — Z00.00 ENCOUNTER FOR GENERAL ADULT MEDICAL EXAMINATION W/OUT ABNORMAL FINDINGS: ICD-10-CM

## 2023-05-30 PROCEDURE — 99214 OFFICE O/P EST MOD 30 MIN: CPT

## 2023-05-30 NOTE — HISTORY OF PRESENT ILLNESS
[FreeTextEntry1] : Dr. Agustin Lowry (cardiologist)\par [address] [facility]\par [address]\[place] [address]\par \par CC: Dysuria\par \par Patient returns with intermittent dysuria \par He had dysuria in the past, and it is intermittent\par He had hematospermia in the past but it resolved\par \par PSA 1.0    7/2020\par PSA 1.76  4/2022 \par \par Uroflow in 2020: \par  cc\par Qmax 8.9 cc/s\par Qaverage 4.1 cc/s\par PVR: 35 cc \par \par Anticoagulation: ASA 81 mg\par All: NKDA\par Social: restaurant LES, , no kids\par PMHx: CAD s/p cardiac stents 2020; triple bypass and aortic valve replacement 10/2020, cholecystectomy \par FHx: father passed away from MI, familial hyperlipidemia, \par PSHx: cardiac stent placement, no family history of  malignancy

## 2023-05-31 LAB
APPEARANCE: CLEAR
BACTERIA: NEGATIVE /HPF
BILIRUBIN URINE: NEGATIVE
BLOOD URINE: NEGATIVE
CAST: 0 /LPF
COLOR: YELLOW
EPITHELIAL CELLS: 0 /HPF
GLUCOSE QUALITATIVE U: NEGATIVE MG/DL
KETONES URINE: NEGATIVE MG/DL
LEUKOCYTE ESTERASE URINE: NEGATIVE
MICROSCOPIC-UA: NORMAL
NITRITE URINE: NEGATIVE
PH URINE: 5.5
PROTEIN URINE: NEGATIVE MG/DL
PSA SERPL-MCNC: 1.27 NG/ML
RED BLOOD CELLS URINE: 3 /HPF
SPECIFIC GRAVITY URINE: 1.02
UROBILINOGEN URINE: 0.2 MG/DL
WHITE BLOOD CELLS URINE: 1 /HPF

## 2023-06-02 LAB — BACTERIA UR CULT: NORMAL

## 2023-06-09 LAB
MYCOPLASMA HOMINIS CULTURE: NEGATIVE
UREAPLASMA CULTURE: NEGATIVE

## 2023-06-20 ENCOUNTER — APPOINTMENT (OUTPATIENT)
Dept: UROLOGY | Facility: CLINIC | Age: 58
End: 2023-06-20

## 2023-09-15 ENCOUNTER — LABORATORY RESULT (OUTPATIENT)
Age: 58
End: 2023-09-15

## 2023-09-15 ENCOUNTER — APPOINTMENT (OUTPATIENT)
Dept: UROLOGY | Facility: CLINIC | Age: 58
End: 2023-09-15
Payer: MEDICAID

## 2023-09-15 VITALS
OXYGEN SATURATION: 97 % | TEMPERATURE: 97.7 F | SYSTOLIC BLOOD PRESSURE: 114 MMHG | HEART RATE: 66 BPM | BODY MASS INDEX: 31.89 KG/M2 | HEIGHT: 63 IN | WEIGHT: 180 LBS | DIASTOLIC BLOOD PRESSURE: 69 MMHG

## 2023-09-15 DIAGNOSIS — R30.0 DYSURIA: ICD-10-CM

## 2023-09-15 PROCEDURE — 99213 OFFICE O/P EST LOW 20 MIN: CPT

## 2023-09-20 LAB
APPEARANCE: CLEAR
BACTERIA: NEGATIVE /HPF
BILIRUBIN URINE: NEGATIVE
BLOOD URINE: NEGATIVE
C TRACH RRNA SPEC QL NAA+PROBE: NOT DETECTED
CAST: 0 /LPF
COLOR: YELLOW
EPITHELIAL CELLS: 0 /HPF
GLUCOSE QUALITATIVE U: NEGATIVE MG/DL
HCV AB SER QL: NONREACTIVE
HCV S/CO RATIO: 0.1 S/CO
HSV 1+2 IGG SER IA-IMP: NEGATIVE
HSV 1+2 IGG SER IA-IMP: POSITIVE
HSV1 IGG SER QL: >62.2 INDEX
HSV2 IGG SER QL: 0.37 INDEX
KETONES URINE: NEGATIVE MG/DL
LEUKOCYTE ESTERASE URINE: NEGATIVE
MICROSCOPIC-UA: NORMAL
N GONORRHOEA RRNA SPEC QL NAA+PROBE: NOT DETECTED
NITRITE URINE: NEGATIVE
PH URINE: 5.5
PROTEIN URINE: NORMAL MG/DL
RED BLOOD CELLS URINE: 1 /HPF
RPR SER-TITR: NORMAL
SOURCE AMPLIFICATION: NORMAL
SOURCE AMPLIFICATION: NORMAL
SPECIFIC GRAVITY URINE: 1.02
T VAGINALIS RRNA SPEC QL NAA+PROBE: NOT DETECTED
UROBILINOGEN URINE: 0.2 MG/DL
WHITE BLOOD CELLS URINE: 1 /HPF

## 2024-05-14 ENCOUNTER — APPOINTMENT (OUTPATIENT)
Dept: UROLOGY | Facility: CLINIC | Age: 59
End: 2024-05-14

## 2024-08-06 ENCOUNTER — NON-APPOINTMENT (OUTPATIENT)
Age: 59
End: 2024-08-06

## 2025-04-25 NOTE — ED PROVIDER NOTE - CPE EDP ENMT NORM
normal...

## 2025-05-13 NOTE — ED ADULT TRIAGE NOTE - NSSEPSISSUSPECTED_ED_A_ED
Consent: The patient's consent was obtained including but not limited to risks of crusting, scabbing, blistering, scarring, darker or lighter pigmentary change, recurrence, incomplete removal and infection. Show Aperture Variable?: Yes Render Note In Bullet Format When Appropriate: No Duration Of Freeze Thaw-Cycle (Seconds): 0 Post-Care Instructions: I reviewed with the patient in detail post-care instructions. Patient is to wear sunprotection, and avoid picking at any of the treated lesions. Pt may apply Vaseline to crusted or scabbing areas. Detail Level: Detailed No